# Patient Record
Sex: MALE | Race: WHITE | NOT HISPANIC OR LATINO | Employment: OTHER | URBAN - NONMETROPOLITAN AREA
[De-identification: names, ages, dates, MRNs, and addresses within clinical notes are randomized per-mention and may not be internally consistent; named-entity substitution may affect disease eponyms.]

---

## 2017-01-03 ENCOUNTER — TELEPHONE (OUTPATIENT)
Dept: VASCULAR SURGERY | Facility: CLINIC | Age: 52
End: 2017-01-03

## 2017-01-06 ENCOUNTER — HOSPITAL ENCOUNTER (OUTPATIENT)
Dept: PHYSICAL THERAPY | Facility: HOSPITAL | Age: 52
Setting detail: THERAPIES SERIES
Discharge: HOME | End: 2017-01-06
Attending: PODIATRIST

## 2017-01-06 DIAGNOSIS — G89.29 CHRONIC PAIN OF LEFT ANKLE: Primary | ICD-10-CM

## 2017-01-06 DIAGNOSIS — M25.572 CHRONIC PAIN OF LEFT ANKLE: Primary | ICD-10-CM

## 2017-01-06 DIAGNOSIS — M95.8 OSTEOCHONDRAL DEFECT OF ANKLE: ICD-10-CM

## 2017-01-06 PROCEDURE — 97161 PT EVAL LOW COMPLEX 20 MIN: CPT

## 2017-01-06 NOTE — PROGRESS NOTES
Outpatient Physical Therapy Ortho Initial Evaluation   [facility]     Patient Name: Ismael Huff  : 1965  MRN: [id number]  Today's Date: 2017      Visit Date: 2017    Patient Active Problem List   Diagnosis   • Osteochondral defect of ankle   • Chronic pain of left ankle        Past Medical History   Diagnosis Date   • Ankle pain, left    • Arthritis    • Gout    • History of kidney stones    • History of pneumonia    • Tendonitis of ankle      left        Past Surgical History   Procedure Laterality Date   • Colonoscopy     • Knee surgery Left      torn cartilage repair   • Colectomy partial / total       polyps ruptured   • Leg excision lesion/cyst Left 2016     Procedure: ANKLE LOOSE BODY EXCISION/REMOVAL, LEFT;  Surgeon: Ajay Comer DPM;  Location: [facility];  Service:    • Ankle surgery Left        Visit Dx:     ICD-10-CM ICD-9-CM   1. Chronic pain of left ankle M25.572 719.47    G89.29 338.29   2. Osteochondral defect of ankle M95.8 738.8             Patient History       17 1400          History    Chief Complaint Pain  -RS      Type of Pain Ankle pain  -RS      Date Current Problem(s) Began 10/19/16  -RS      Brief Description of Current Complaint Patient underwent surgery for a osteochondral defect on the left ankle on 2106.  He has had multiple ankle sprains prior to surgery.  The ankle was swelling, turning red, and had pain with weight bearing in the joint.  The patient has been in a walking boot for the past 4 weeks and is now off crutches.  Overall the ankle is doing much better but is very stiff to the patient.  No falls since surgery  -RS      Previous treatment for THIS PROBLEM Injections  -RS      Onset Date- PT 2017  -RS      Patient/Caregiver Goals Relieve pain;Return to prior level of function;Return to work  -RS      Current Tobacco Use no  -RS      Patient's Rating of General Health Very good  -RS      Hand Dominance right-handed   -RS      Occupation/sports/leisure activities   -RS      What clinical tests have you had for this problem? MRI  -RS      Pain     Pain Location Ankle   left  -RS      Pain at Present 0  -RS      Pain at Best 0  -RS      Pain at Worst 8  -RS      Pain Frequency Rarely  -RS      Pain Description Aching;Dull  -RS      Pain Comments virtually no pain over the past 2-3 weeks.  -RS      Fall Risk Assessment    Any falls in the past year: No  -RS      Daily Activities    Primary Language English  -RS      Pt Participated in POC and Goals Yes  -RS      Safety    Are you being hurt, hit, or frightened by anyone at home or in your life? No  -RS      Are you being neglected by a caregiver No  -RS        User Key  (r) = Recorded By, (t) = Taken By, (c) = Cosigned By    Initials Name Provider Type    RS Ismael Douglass, PT Physical Therapist                PT Ortho       01/06/17 1500    Posture/Observations    Observations Incision healing;Muscle atrophy  -RS    Posture/Observations Comments anterior tibialis atrophy, calf atrophy.  Scar mobility was limited in the mid portion with superior/inferior glide.  -RS    Sensation    Sensation WNL? WNL  -RS    Light Touch No apparent deficits  -RS    Additional Comments no hypersensitivity noted in general  -RS    Special Tests/Palpation    Special Tests/Palpation Ankle/Foot  -RS    Foot/Ankle Palpation    Lateral Malleolus Left:   not tender  -RS    Medial Malleolus Left:   not tender  -RS    Plantar Fascia Left:;Fibrotic  -RS    Medial Gastroc Left:;Guarded/taut;Tender  -RS    Lateral Gastroc Left:;Guarded/taut;Tender  -RS    Foot/Ankle Palpation? Yes  -RS    Ankle Accessory Motions    Ankle Accessory Motions Tested? Yes  -RS    Talocrural (talotibial) A-P glide Left:;Hypomobile  -RS    Subtalar medial/lateral tilt Left:;Hypomobile  -RS    Mid-tarsal dorsal/plantar glide Left:;Hypomobile  -RS    Tarsometatarsal dorsal/plantar glide Left:;WNL  -RS     Ankle/Foot Special Tests    Anterior drawer (ATFL lesion) Left:;Negative  -RS    Talar tilt test (instability) Left:;Negative  -RS    ROM (Range of Motion)    General ROM Detail Left AROM:  DF -6, PF 25, INV 5, EV 0.  PROM:  DF 0, PF 35, INV 25, EV 10  -RS    MMT (Manual Muscle Testing)    General MMT Assessment lower extremity strength deficits identified  -RS    Lower Extremity    Lower Ext Manual Muscle Testing left ankle strength deficit  -RS    Left Ankle/Foot    Tibialis Ant Ankle DF with Subtalar Inv (3-/5) fair minus  -RS    Tibialis Posterior Subtalar Inversion (3-/5) fair minus  -RS    Subtalar Ever Peroneus Longus & Brevis (3/5) fair  -RS    Pathomechanics    Lower Extremity Pathomechanics Excessive hip external rotation;Antalgic with midstance;Asymmetrical steps  -RS    Pathomechanics Comments due to prolonged boot wear  -RS    Balance Skills Training    SLS unable at this time due to walking boot  -RS    Gait Assessment/Treatment    Gait, Comment Patient ambulates with an antalgic gait with strong toeing out due to the walking boot.  Patient was previously on crutches.  Patient does not have any hip pain with the walking boot at this time.  Patient is vaulting over the left stance LE due to height differences from the boot.  -RS      User Key  (r) = Recorded By, (t) = Taken By, (c) = Cosigned By    Initials Name Provider Type    JENNIFER Douglass PT Physical Therapist                            Therapy Education       01/06/17 1500    Therapy Education    Given HEP  -RS    Program New   gastroc and soleus stretch; sitting DF and PF  -RS    How Provided Written  -RS    Provided to Patient  -RS    Level of Understanding Demonstrated;Verbalized  -RS      User Key  (r) = Recorded By, (t) = Taken By, (c) = Cosigned By    Initials Name Provider Type    JENNIFER Douglass PT Physical Therapist                PT OP Goals       01/06/17 1500          PT Short Term Goals    STG Date to Achieve  01/27/17  -RS      STG 1 Patient will improve left ankle DF PROM to 10 degrees  -RS      STG 1 Progress New  -RS      STG 2 Patient will improve left ankle DF AROM to 5 degrees without compensation  -RS      STG 2 Progress New  -RS      Long Term Goals    LTG Date to Achieve 02/17/17  -RS      LTG 1 Patient will be independent with a comprehensive HEP by discharge.  -RS      LTG 1 Progress New  -RS      LTG 2 Patient will improve gross ankle MMT to at least 4+/5 by discharge.  -RS      LTG 2 Progress New  -RS      LTG 3 Patient will be able to ambulate with proper forward weight shifting, avoiding forefoot ER or tibial ER, to promote return to work by discharge.  -RS      LTG 3 Progress New  -RS      LTG 4 Patient will be able to perform a left SLS without compensation at the pelvis for at least 10 seconds with the eyes closed by discharge.  -RS      LTG 4 Progress New  -RS      LTG 5 Patient will be able to return to work and perform work specific duties with <3/10 left ankle pain by discharge.  -RS      LTG 5 Progress New  -RS      Time Calculation    PT Goal Re-Cert Due Date 02/05/17  -RS        User Key  (r) = Recorded By, (t) = Taken By, (c) = Cosigned By    Initials Name Provider Type    RS Ismael Douglass, PT Physical Therapist                PT Assessment/Plan       01/06/17 1500          PT Assessment    Functional Limitations Impaired gait;Performance in work activities;Performance in leisure activities  -RS      Impairments Balance;Gait;Joint mobility;Muscle strength;Pain;Range of motion  -RS      Assessment Comments Patient presents today s/p left ankle surgery for an osteochondral defect on 11/22/2016.  Patient has been in a walking boot and is still wearing this boot at the time of the evaluation.  The DPM will be contacted regarding timetable for removing the walking boot.  The patient does not have enough ankle dorsiflexion ROM at this time to walk without significant compensation, which is  "partly due to prolonged boot wear.  General motion in all planes is limited both actively and passively.  Corresponding diffuse weakness is noted.  The ankle is only slightly swollen at this time with no formal girth measurements taken today.  Joint mobility of the talocrural and sub talar are stiff and should improve quickly with manual intervention.  The gastrocnemius is the main contributor to the insufficient dorsiflexion at this time and we will work to improve flexibility early on.  We will gradually progress weight bearing, strengthening, and proprioception as ankle mobility/ROM improves.  Patient is motivated to get back to work and this is a positive finding for therapy.  Patient was given a \"Tg \" size \"D\" compression stockinette to help with the swelling.  Modalities may be used for control of swelling early on.  The incision was closed with no symptoms of infection.  Thank you for allowing us to work with this patient.   -RS      Please refer to paper survey for additional self-reported information Yes  -RS      Rehab Potential Good  -RS      Patient/caregiver participated in establishment of treatment plan and goals Yes  -RS      Patient would benefit from skilled therapy intervention Yes  -RS      PT Plan    PT Frequency 1x/week;2x/week  -RS      Predicted Duration of Therapy Intervention (days/wks) 4-6 weeks  -RS      Planned CPT's? PT EVAL: 59245;PT THER PROC EA 15 MIN: 63681;PT MANUAL THERAPY EA 15 MIN: 19101;PT NEUROMUSC RE-EDUCATION EA 15 MIN: 27024;PT GAIT TRAINING EA 15 MIN: 11731;PT ELECTRICAL STIM UNATTEND: ;PT ELECTRICAL STIM ATTD EA 15 MIN: 48875  -RS      Physical Therapy Interventions (Optional Details) balance training;gait training;home exercise program;joint mobilization;manual lymphatic drainage;modalities;neuromuscular re-education;patient/family education;ROM (Range of Motion);strengthening;stretching;taping  -RS      PT Plan Comments We will begin with early focus on ROM, " joint mobility, and improving gastronemius flexibility due to boot wear.  We will integrate strengthening and proprioception as ROM gains improve.  The patient has an insurance that does not cover outpatient services and this will be taken into consideration when we are scheduling visits after authorization.  -RS        User Key  (r) = Recorded By, (t) = Taken By, (c) = Cosigned By    Initials Name Provider Type    RS Ismael Douglass, PT Physical Therapist                                    Time Calculation:   Start Time: 1435  Stop Time: 1520  Time Calculation (min): 45 min     Therapy Charges for Today     Code Description Service Date Service Provider Modifiers Qty    92284225439  PT EVAL LOW COMPLEXITY 3 1/6/2017 Ismael Douglass, PT GP 1                    Ismael Douglass, PT  1/6/2017

## 2017-01-11 ENCOUNTER — HOSPITAL ENCOUNTER (OUTPATIENT)
Dept: PHYSICAL THERAPY | Facility: HOSPITAL | Age: 52
Setting detail: THERAPIES SERIES
Discharge: HOME | End: 2017-01-11
Attending: PODIATRIST

## 2017-01-11 DIAGNOSIS — M95.8 OSTEOCHONDRAL DEFECT OF ANKLE: ICD-10-CM

## 2017-01-11 DIAGNOSIS — M25.572 CHRONIC PAIN OF LEFT ANKLE: Primary | ICD-10-CM

## 2017-01-11 DIAGNOSIS — G89.29 CHRONIC PAIN OF LEFT ANKLE: Primary | ICD-10-CM

## 2017-01-11 PROCEDURE — 97110 THERAPEUTIC EXERCISES: CPT | Performed by: PHYSICAL THERAPIST

## 2017-01-11 PROCEDURE — 97140 MANUAL THERAPY 1/> REGIONS: CPT | Performed by: PHYSICAL THERAPIST

## 2017-01-11 NOTE — PROGRESS NOTES
Outpatient Physical Therapy Ortho Treatment Note   [facility]     Patient Name: Ismael Huff  : 1965  MRN: [id number]  Today's Date: 2017      Visit Date: 2017    Visit Dx:    ICD-10-CM ICD-9-CM   1. Chronic pain of left ankle M25.572 719.47    G89.29 338.29   2. Osteochondral defect of ankle M95.8 738.8       Patient Active Problem List   Diagnosis   • Osteochondral defect of ankle   • Chronic pain of left ankle        Past Medical History   Diagnosis Date   • Ankle pain, left    • Arthritis    • Gout    • History of kidney stones    • History of pneumonia    • Tendonitis of ankle      left        Past Surgical History   Procedure Laterality Date   • Colonoscopy     • Knee surgery Left      torn cartilage repair   • Colectomy partial / total       polyps ruptured   • Leg excision lesion/cyst Left 2016     Procedure: ANKLE LOOSE BODY EXCISION/REMOVAL, LEFT;  Surgeon: Ajay Comer DPM;  Location: [facility] OR;  Service:    • Ankle surgery Left                              PT Assessment/Plan       17 1424 17 1500       PT Assessment    Functional Limitations  Impaired gait;Performance in work activities;Performance in leisure activities  -RS     Impairments  Balance;Gait;Joint mobility;Muscle strength;Pain;Range of motion  -RS     Assessment Comments No goals met today, this is his first visit since his initial evaluation. He is still limited with ankle and calf mobiltiy.   -KR Patient presents today s/p left ankle surgery for an osteochondral defect on 2016.  Patient has been in a walking boot and is still wearing this boot at the time of the evaluation.  The DPM will be contacted regarding timetable for removing the walking boot.  The patient does not have enough ankle dorsiflexion ROM at this time to walk without significant compensation, which is partly due to prolonged boot wear.  General motion in all planes is limited both actively and passively.   "Corresponding diffuse weakness is noted.  The ankle is only slightly swollen at this time with no formal girth measurements taken today.  Joint mobility of the talocrural and sub talar are stiff and should improve quickly with manual intervention.  The gastrocnemius is the main contributor to the insufficient dorsiflexion at this time and we will work to improve flexibility early on.  We will gradually progress weight bearing, strengthening, and proprioception as ankle mobility/ROM improves.  Patient is motivated to get back to work and this is a positive finding for therapy.  Patient was given a \"Tg \" size \"D\" compression stockinette to help with the swelling.  Modalities may be used for control of swelling early on.  The incision was closed with no symptoms of infection.  Thank you for allowing us to work with this patient.   -RS     Please refer to paper survey for additional self-reported information  Yes  -RS     Rehab Potential  Good  -RS     Patient/caregiver participated in establishment of treatment plan and goals  Yes  -RS     Patient would benefit from skilled therapy intervention  Yes  -RS     PT Plan    PT Frequency  1x/week;2x/week  -RS     Predicted Duration of Therapy Intervention (days/wks)  4-6 weeks  -RS     Planned CPT's?  PT EVAL: 62371;PT THER PROC EA 15 MIN: 09444;PT MANUAL THERAPY EA 15 MIN: 78762;PT NEUROMUSC RE-EDUCATION EA 15 MIN: 34998;PT GAIT TRAINING EA 15 MIN: 23693;PT ELECTRICAL STIM UNATTEND: ;PT ELECTRICAL STIM ATTD EA 15 MIN: 01312  -RS     Physical Therapy Interventions (Optional Details)  balance training;gait training;home exercise program;joint mobilization;manual lymphatic drainage;modalities;neuromuscular re-education;patient/family education;ROM (Range of Motion);strengthening;stretching;taping  -RS     PT Plan Comments We will continue to work ankle and calf mobiltiy and progress with ankle stabiltiy and proprioception.   -KR We will begin with early focus on ROM, " joint mobility, and improving gastronemius flexibility due to boot wear.  We will integrate strengthening and proprioception as ROM gains improve.  The patient has an insurance that does not cover outpatient services and this will be taken into consideration when we are scheduling visits after authorization.  -RS       User Key  (r) = Recorded By, (t) = Taken By, (c) = Cosigned By    Initials Name Provider Type    ESVIN Barnett, PT Physical Therapist    RS Ismael Douglass, PT Physical Therapist                    Exercises       01/11/17 1424          Subjective Comments    Subjective Comments He repots only a littl pain in the ankle with exercises.   -KR      Subjective Pain    Able to rate subjective pain? yes  -KR      Pre-Treatment Pain Level 0  -KR      Post-Treatment Pain Level 0  -KR      Exercise 1    Exercise Name 1 therapist assist place and pt hold PF/DF/Ev/In  -KR      Reps 1 5  -KR      Time (Seconds) 1 10  -KR      Exercise 2    Exercise Name 2 discussed boot use and conitnuation of HEP.   -KR        User Key  (r) = Recorded By, (t) = Taken By, (c) = Cosigned By    Initials Name Provider Type    ESVIN Barnett, PT Physical Therapist                        Manual Rx (last 36 hours)      Manual Treatments       01/11/17 1424          Manual Rx 1    Manual Rx 1 Location reclined talocrual A-Ps   with stretching intermittently.   -KR      Manual Rx 1 Grade 1-2  -KR      Manual Rx 1 Duration 10  -KR      Manual Rx 2    Manual Rx 2 Location prone STM to gastroc/soleus and plantar fascia  -KR      Manual Rx 2 Type with edge tool  -KR      Manual Rx 2 Grade mod   -KR      Manual Rx 2 Duration 15  -KR        User Key  (r) = Recorded By, (t) = Taken By, (c) = Cosigned By    Initials Name Provider Type    ESVIN Barnett, PT Physical Therapist                PT OP Goals       01/11/17 1424 01/06/17 1500       PT Short Term Goals    STG Date to Achieve 01/27/17  -ESVIN 01/27/17  -RS     STG  1 Patient will improve left ankle DF PROM to 10 degrees  -KR Patient will improve left ankle DF PROM to 10 degrees  -RS     STG 1 Progress Ongoing  -KR New  -RS     STG 2 Patient will improve left ankle DF AROM to 5 degrees without compensation  -KR Patient will improve left ankle DF AROM to 5 degrees without compensation  -RS     STG 2 Progress Ongoing  -KR New  -RS     Long Term Goals    LTG Date to Achieve 02/17/17  -KR 02/17/17  -RS     LTG 1 Patient will be independent with a comprehensive HEP by discharge.  -KR Patient will be independent with a comprehensive HEP by discharge.  -RS     LTG 1 Progress Ongoing  -KR New  -RS     LTG 1 Progress Comments verbalizes compliance with inital.   -KR      LTG 2 Patient will improve gross ankle MMT to at least 4+/5 by discharge.  -KR Patient will improve gross ankle MMT to at least 4+/5 by discharge.  -RS     LTG 2 Progress Ongoing  -KR New  -RS     LTG 2 Progress Comments still limtied with mobillity.   -KR      LTG 3 Patient will be able to ambulate with proper forward weight shifting, avoiding forefoot ER or tibial ER, to promote return to work by discharge.  -KR Patient will be able to ambulate with proper forward weight shifting, avoiding forefoot ER or tibial ER, to promote return to work by discharge.  -RS     LTG 3 Progress Ongoing  -KR New  -RS     LTG 4 Patient will be able to perform a left SLS without compensation at the pelvis for at least 10 seconds with the eyes closed by discharge.  -KR Patient will be able to perform a left SLS without compensation at the pelvis for at least 10 seconds with the eyes closed by discharge.  -RS     LTG 4 Progress Ongoing  -KR New  -RS     LTG 5 Patient will be able to return to work and perform work specific duties with <3/10 left ankle pain by discharge.  -KR Patient will be able to return to work and perform work specific duties with <3/10 left ankle pain by discharge.  -RS     LTG 5 Progress Ongoing  -KR New  -RS     Time  Calculation    PT Goal Re-Cert Due Date 02/05/17  -KR 02/05/17  -RS       User Key  (r) = Recorded By, (t) = Taken By, (c) = Cosigned By    Initials Name Provider Type    ESVIN Barnett, PT Physical Therapist    RS Ismael Douglass, PT Physical Therapist                Therapy Education       01/11/17 1424    Therapy Education    Given HEP;Symptoms/condition management;Posture/body mechanics  -KR    Program Reinforced  -KR    How Provided Verbal  -KR    Provided to Patient  -KR    Level of Understanding Verbalized  -KR      01/06/17 1500    Therapy Education    Given HEP  -RS    Program New   gastroc and soleus stretch; sitting DF and PF  -RS    How Provided Written  -RS    Provided to Patient  -RS    Level of Understanding Demonstrated;Verbalized  -RS      User Key  (r) = Recorded By, (t) = Taken By, (c) = Cosigned By    Initials Name Provider Type    ESVIN Barnett, PT Physical Therapist    RS Ismael Douglass, PT Physical Therapist                Time Calculation:   Start Time: 1424  Stop Time: 1511  Time Calculation (min): 47 min  Total Timed Code Minutes- PT: 47 minute(s)    Therapy Charges for Today     Code Description Service Date Service Provider Modifiers Qty    80104044920 HC PT THER PROC EA 15 MIN 1/11/2017 Allyn Barnett, PT GP 1    54668889503 HC PT MANUAL THERAPY EA 15 MIN 1/11/2017 Allyn Barnett, PT GP 2                    Allyn Barnett, PT  1/11/2017

## 2017-01-13 ENCOUNTER — HOSPITAL ENCOUNTER (OUTPATIENT)
Dept: PHYSICAL THERAPY | Facility: HOSPITAL | Age: 52
Setting detail: THERAPIES SERIES
Discharge: HOME | End: 2017-01-13
Attending: PODIATRIST

## 2017-01-13 DIAGNOSIS — G89.29 CHRONIC PAIN OF LEFT ANKLE: Primary | ICD-10-CM

## 2017-01-13 DIAGNOSIS — M25.572 CHRONIC PAIN OF LEFT ANKLE: Primary | ICD-10-CM

## 2017-01-13 DIAGNOSIS — M95.8 OSTEOCHONDRAL DEFECT OF ANKLE: ICD-10-CM

## 2017-01-13 PROCEDURE — 97140 MANUAL THERAPY 1/> REGIONS: CPT

## 2017-01-13 NOTE — PROGRESS NOTES
Outpatient Physical Therapy Ortho Treatment Note  [facility]     Patient Name: Ismael Huff  : 1965  MRN: [id number]  Today's Date: 2017      Visit Date: 2017    Visit Dx:    ICD-10-CM ICD-9-CM   1. Chronic pain of left ankle M25.572 719.47    G89.29 338.29   2. Osteochondral defect of ankle M95.8 738.8       Patient Active Problem List   Diagnosis   • Osteochondral defect of ankle   • Chronic pain of left ankle        Past Medical History   Diagnosis Date   • Ankle pain, left    • Arthritis    • Gout    • History of kidney stones    • History of pneumonia    • Tendonitis of ankle      left        Past Surgical History   Procedure Laterality Date   • Colonoscopy     • Knee surgery Left      torn cartilage repair   • Colectomy partial / total       polyps ruptured   • Leg excision lesion/cyst Left 2016     Procedure: ANKLE LOOSE BODY EXCISION/REMOVAL, LEFT;  Surgeon: Ajay Comer DPM;  Location: [facility] OR;  Service:    • Ankle surgery Left                              PT Assessment/Plan       17 1646 17 1424       PT Assessment    Assessment Comments L ankle measured 49 cm, R measured 48.3 cm figure 8 at joint line today. M/L tiibial glide was restricted prior to mobilization. I advised him to increase his ice use over the weekend.  -EC No goals met today, this is his first visit since his initial evaluation. He is still limited with ankle and calf mobiltiy.   -KR     PT Plan    PT Plan Comments Increase aactivities with BAPS as they were difficult for him to control today.  -EC We will continue to work ankle and calf mobiltiy and progress with ankle stabiltiy and proprioception.   -KR       User Key  (r) = Recorded By, (t) = Taken By, (c) = Cosigned By    Initials Name Provider Type    EC Varun Archibald, PTA Physical Therapy Assistant    ESVIN Barnett, PT Physical Therapist                    Exercises       17 1500          Subjective  Comments    Subjective Comments Denies pain or rrecent falls, reports feels a little stiff in L ankle  -EC      Subjective Pain    Able to rate subjective pain? yes  -EC      Pre-Treatment Pain Level 0  -EC      Post-Treatment Pain Level 0  -EC      Exercise 1    Exercise Name 1 DF/PF, pronation/supination, CW   -EC      Equipment 1 --   BAPS board L 2  -EC      Time (Minutes) 1 2   each  -EC        User Key  (r) = Recorded By, (t) = Taken By, (c) = Cosigned By    Initials Name Provider Type    EC Varun Archibald PTA Physical Therapy Assistant                        Manual Rx (last 36 hours)      Manual Treatments       01/13/17 1300          Manual Rx 1    Manual Rx 1 Location L gastroc  -EC      Manual Rx 1 Type IASTM with EDGE  tool  -EC      Manual Rx 1 Duration 15  -EC      Manual Rx 2    Manual Rx 2 Location L taleocrural  -EC      Manual Rx 2 Type P/A mobilization with float and intermittent distraction  -EC      Manual Rx 2 Duration 15  -EC      Manual Rx 3    Manual Rx 3 Location M/L tibial glides  -EC      Manual Rx 3 Grade 2  -EC      Manual Rx 3 Duration 5  -EC      Manual Rx 4    Manual Rx 4 Location DFR B plantar surface  -EC      Manual Rx 4 Duration 5   2.5 min each  -EC        User Key  (r) = Recorded By, (t) = Taken By, (c) = Cosigned By    Initials Name Provider Type    EC Varun Archibald PTA Physical Therapy Assistant                PT OP Goals       01/13/17 1500 01/13/17 1458 01/11/17 1424    PT Short Term Goals    STG Date to Achieve 01/27/17  -EC  01/27/17  -KR    STG 1 Patient will improve left ankle DF PROM to 10 degrees  -EC  Patient will improve left ankle DF PROM to 10 degrees  -KR    STG 1 Progress Ongoing  -EC  Ongoing  -KR    STG 2 Patient will improve left ankle DF AROM to 5 degrees without compensation  -EC  Patient will improve left ankle DF AROM to 5 degrees without compensation  -KR    STG 2 Progress Ongoing  -EC  Ongoing  -KR    STG 2 Progress Comments L ankle exhibited  edmea and restricted ROM  -EC      Long Term Goals    LTG Date to Achieve 02/17/17  -EC  02/17/17  -KR    LTG 1 Patient will be independent with a comprehensive HEP by discharge.  -EC  Patient will be independent with a comprehensive HEP by discharge.  -KR    LTG 1 Progress Ongoing  -EC  Ongoing  -KR    LTG 1 Progress Comments   verbalizes compliance with inital.   -KR    LTG 2 Patient will improve gross ankle MMT to at least 4+/5 by discharge.  -EC  Patient will improve gross ankle MMT to at least 4+/5 by discharge.  -KR    LTG 2 Progress Ongoing  -EC  Ongoing  -KR    LTG 2 Progress Comments   still limtied with mobillity.   -KR    LTG 3 Patient will be able to ambulate with proper forward weight shifting, avoiding forefoot ER or tibial ER, to promote return to work by discharge.  -EC  Patient will be able to ambulate with proper forward weight shifting, avoiding forefoot ER or tibial ER, to promote return to work by discharge.  -KR    LTG 3 Progress Ongoing  -EC  Ongoing  -KR    LTG 3 Progress Comments gait pattern is slowly improving   -EC      LTG 4 Patient will be able to perform a left SLS without compensation at the pelvis for at least 10 seconds with the eyes closed by discharge.  -EC  Patient will be able to perform a left SLS without compensation at the pelvis for at least 10 seconds with the eyes closed by discharge.  -KR    LTG 4 Progress Ongoing  -EC  Ongoing  -KR    LTG 5 Patient will be able to return to work and perform work specific duties with <3/10 left ankle pain by discharge.  -EC  Patient will be able to return to work and perform work specific duties with <3/10 left ankle pain by discharge.  -KR    LTG 5 Progress Ongoing  -EC  Ongoing  -KR    Time Calculation    PT Goal Re-Cert Due Date  02/05/17  -EC 02/05/17  -KR      01/06/17 1500          PT Short Term Goals    STG Date to Achieve 01/27/17  -RS      STG 1 Patient will improve left ankle DF PROM to 10 degrees  -RS      STG 1 Progress New   -RS      STG 2 Patient will improve left ankle DF AROM to 5 degrees without compensation  -RS      STG 2 Progress New  -RS      Long Term Goals    LTG Date to Achieve 02/17/17  -RS      LTG 1 Patient will be independent with a comprehensive HEP by discharge.  -RS      LTG 1 Progress New  -RS      LTG 2 Patient will improve gross ankle MMT to at least 4+/5 by discharge.  -RS      LTG 2 Progress New  -RS      LTG 3 Patient will be able to ambulate with proper forward weight shifting, avoiding forefoot ER or tibial ER, to promote return to work by discharge.  -RS      LTG 3 Progress New  -RS      LTG 4 Patient will be able to perform a left SLS without compensation at the pelvis for at least 10 seconds with the eyes closed by discharge.  -RS      LTG 4 Progress New  -RS      LTG 5 Patient will be able to return to work and perform work specific duties with <3/10 left ankle pain by discharge.  -RS      LTG 5 Progress New  -RS      Time Calculation    PT Goal Re-Cert Due Date 02/05/17  -RS        User Key  (r) = Recorded By, (t) = Taken By, (c) = Cosigned By    Initials Name Provider Type     Varun Archibald PTA Physical Therapy Assistant    ESVIN Barnett, PT Physical Therapist    RS Ismael Douglass, TRA Physical Therapist                Therapy Education       01/11/17 1424    Therapy Education    Given HEP;Symptoms/condition management;Posture/body mechanics  -KR    Program Reinforced  -KR    How Provided Verbal  -KR    Provided to Patient  -KR    Level of Understanding Verbalized  -KR      User Key  (r) = Recorded By, (t) = Taken By, (c) = Cosigned By    Initials Name Provider Type    ESVIN Barnett PT Physical Therapist                Time Calculation:   Start Time: 0257  Stop Time: 0345  Time Calculation (min): 48 min    Therapy Charges for Today     Code Description Service Date Service Provider Modifiers Qty    00845365137 HC PT MANUAL THERAPY EA 15 MIN 1/13/2017 Varun Archibald PTA GP 3                     Varun Archibald, PTA  1/13/2017

## 2017-01-16 ENCOUNTER — HOSPITAL ENCOUNTER (OUTPATIENT)
Dept: PHYSICAL THERAPY | Facility: HOSPITAL | Age: 52
Setting detail: THERAPIES SERIES
Discharge: HOME | End: 2017-01-16
Attending: PODIATRIST

## 2017-01-16 DIAGNOSIS — M25.572 CHRONIC PAIN OF LEFT ANKLE: Primary | ICD-10-CM

## 2017-01-16 DIAGNOSIS — M95.8 OSTEOCHONDRAL DEFECT OF ANKLE: ICD-10-CM

## 2017-01-16 DIAGNOSIS — G89.29 CHRONIC PAIN OF LEFT ANKLE: Primary | ICD-10-CM

## 2017-01-16 PROCEDURE — 97032 APPL MODALITY 1+ESTIM EA 15: CPT

## 2017-01-16 PROCEDURE — 97140 MANUAL THERAPY 1/> REGIONS: CPT

## 2017-01-16 NOTE — PROGRESS NOTES
Outpatient Physical Therapy Ortho Treatment Note  [facility]     Patient Name: Ismael Huff  : 1965  MRN: [id number]  Today's Date: 2017      Visit Date: 2017    Visit Dx:    ICD-10-CM ICD-9-CM   1. Chronic pain of left ankle M25.572 719.47    G89.29 338.29   2. Osteochondral defect of ankle M95.8 738.8       Patient Active Problem List   Diagnosis   • Osteochondral defect of ankle   • Chronic pain of left ankle        Past Medical History   Diagnosis Date   • Ankle pain, left    • Arthritis    • Gout    • History of kidney stones    • History of pneumonia    • Tendonitis of ankle      left        Past Surgical History   Procedure Laterality Date   • Colonoscopy     • Knee surgery Left      torn cartilage repair   • Colectomy partial / total       polyps ruptured   • Leg excision lesion/cyst Left 2016     Procedure: ANKLE LOOSE BODY EXCISION/REMOVAL, LEFT;  Surgeon: Ajay Comer DPM;  Location: [facility] OR;  Service:    • Ankle surgery Left                              PT Assessment/Plan       17 1643 17 1646 17 1424    PT Assessment    Assessment Comments L ankle continues to exhibit increased superficial edema despite his ice use and consistent use stevan Tubigrip. However, his abnormal gait patternn may be chronically increasing his ankle edema. Also today he presents with decreased tibial rotation.  -EC L ankle measured 49 cm, R measured 48.3 cm figure 8 at joint line today. M/L tiibial glide was restricted prior to mobilization. I advised him to increase his ice use over the weekend.  -EC No goals met today, this is his first visit since his initial evaluation. He is still limited with ankle and calf mobiltiy.   -KR    PT Plan    PT Plan Comments Continue to progress as symptoms allow  -EC Increase aactivities with BAPS as they were difficult for him to control today.  -EC We will continue to work ankle and calf mobiltiy and progress with ankle  stabiltiy and proprioception.   -KR      User Key  (r) = Recorded By, (t) = Taken By, (c) = Cosigned By    Initials Name Provider Type    LAKESHIA Archibald PTA Physical Therapy Assistant    KR Allyn Barnett, PT Physical Therapist                Modalities       01/16/17 1500          ELECTRICAL STIMULATION    Attended/Unattended Attended  -EC      Stimulation Type --   Laser stim Chronic Inflammation mode  -EC        User Key  (r) = Recorded By, (t) = Taken By, (c) = Cosigned By    Initials Name Provider Type    EC Varun Archibald PTA Physical Therapy Assistant                Exercises       01/16/17 1600          Subjective Comments    Subjective Comments Denies pain or new complaints, reportss used ice daiy over weekend and didn't notice a difference in his swelling  -EC      Subjective Pain    Able to rate subjective pain? yes  -EC      Pre-Treatment Pain Level 0  -EC      Post-Treatment Pain Level 0  -EC      Exercise 1    Exercise Name 1 pillow case scrunchies  -EC      Time (Minutes) 1 2  -EC      Exercise 2    Exercise Name 2 KT application L ankle for lymphatic drainage  -EC        User Key  (r) = Recorded By, (t) = Taken By, (c) = Cosigned By    Initials Name Provider Type    EC Varun Archibald PTA Physical Therapy Assistant                        Manual Rx (last 36 hours)      Manual Treatments       01/16/17 1500          Manual Rx 1    Manual Rx 1 Location L gastroc  -EC      Manual Rx 1 Type IASTM with EDGE  tool  -EC      Manual Rx 1 Duration 10  -EC      Manual Rx 2    Manual Rx 2 Location L taleocrural  -EC      Manual Rx 2 Type P/A mobilization with float and intermittent distraction  -EC      Manual Rx 2 Grade mod   -EC      Manual Rx 2 Duration 10  -EC      Manual Rx 3    Manual Rx 3 Location M/L tibial glides  -EC      Manual Rx 3 Grade 2  -EC      Manual Rx 3 Duration 5  -EC      Manual Rx 4    Manual Rx 4 Location L tibia  -EC      Manual Rx 4 Type IR glides  -EC      Manual Rx 4  Grade 2  -EC      Manual Rx 4 Duration 5  -EC        User Key  (r) = Recorded By, (t) = Taken By, (c) = Cosigned By    Initials Name Provider Type    LAKESHIA Archibald, PTA Physical Therapy Assistant                PT OP Goals       01/16/17 1455 01/16/17 1400 01/13/17 1500    PT Short Term Goals    STG Date to Achieve  01/27/17  -EC 01/27/17  -EC    STG 1  Patient will improve left ankle DF PROM to 10 degrees  -EC Patient will improve left ankle DF PROM to 10 degrees  -EC    STG 1 Progress  Ongoing  -EC Ongoing  -EC    STG 2  Patient will improve left ankle DF AROM to 5 degrees without compensation  -EC Patient will improve left ankle DF AROM to 5 degrees without compensation  -EC    STG 2 Progress  Ongoing  -EC Ongoing  -EC    STG 2 Progress Comments   L ankle exhibited edmea and restricted ROM  -EC    Long Term Goals    LTG Date to Achieve  02/17/17  -EC 02/17/17  -EC    LTG 1  Patient will be independent with a comprehensive HEP by discharge.  -EC Patient will be independent with a comprehensive HEP by discharge.  -EC    LTG 1 Progress  Ongoing  -EC Ongoing  -EC    LTG 2  Patient will improve gross ankle MMT to at least 4+/5 by discharge.  -EC Patient will improve gross ankle MMT to at least 4+/5 by discharge.  -EC    LTG 2 Progress  Ongoing  -EC Ongoing  -EC    LTG 3  Patient will be able to ambulate with proper forward weight shifting, avoiding forefoot ER or tibial ER, to promote return to work by discharge.  -EC Patient will be able to ambulate with proper forward weight shifting, avoiding forefoot ER or tibial ER, to promote return to work by discharge.  -EC    LTG 3 Progress  Ongoing  -EC Ongoing  -EC    LTG 3 Progress Comments  flat foot with absent HS, reduced WS and stance phase on L LE  -EC gait pattern is slowly improving   -EC    LTG 4  Patient will be able to perform a left SLS without compensation at the pelvis for at least 10 seconds with the eyes closed by discharge.  -EC Patient will be able  to perform a left SLS without compensation at the pelvis for at least 10 seconds with the eyes closed by discharge.  -EC    LTG 4 Progress  Ongoing  -EC Ongoing  -EC    LTG 5  Patient will be able to return to work and perform work specific duties with <3/10 left ankle pain by discharge.  -EC Patient will be able to return to work and perform work specific duties with <3/10 left ankle pain by discharge.  -EC    LTG 5 Progress  Ongoing  -EC Ongoing  -EC    LTG 5 Progress Comments  reports walks an average of four miles daily at work and includes lifting and carrying things   -EC     Time Calculation    PT Goal Re-Cert Due Date 02/05/17  -EC        01/13/17 1458 01/11/17 1424 01/06/17 1500    PT Short Term Goals    STG Date to Achieve  01/27/17  -KR 01/27/17  -RS    STG 1  Patient will improve left ankle DF PROM to 10 degrees  -KR Patient will improve left ankle DF PROM to 10 degrees  -RS    STG 1 Progress  Ongoing  -KR New  -RS    STG 2  Patient will improve left ankle DF AROM to 5 degrees without compensation  -KR Patient will improve left ankle DF AROM to 5 degrees without compensation  -RS    STG 2 Progress  Ongoing  -KR New  -RS    Long Term Goals    LTG Date to Achieve  02/17/17  -KR 02/17/17  -RS    LTG 1  Patient will be independent with a comprehensive HEP by discharge.  -KR Patient will be independent with a comprehensive HEP by discharge.  -RS    LTG 1 Progress  Ongoing  -KR New  -RS    LTG 1 Progress Comments  verbalizes compliance with inital.   -KR     LTG 2  Patient will improve gross ankle MMT to at least 4+/5 by discharge.  -KR Patient will improve gross ankle MMT to at least 4+/5 by discharge.  -RS    LTG 2 Progress  Ongoing  -KR New  -RS    LTG 2 Progress Comments  still limtied with mobillity.   -KR     LTG 3  Patient will be able to ambulate with proper forward weight shifting, avoiding forefoot ER or tibial ER, to promote return to work by discharge.  -KR Patient will be able to ambulate with  proper forward weight shifting, avoiding forefoot ER or tibial ER, to promote return to work by discharge.  -RS    LTG 3 Progress  Ongoing  -KR New  -RS    LTG 4  Patient will be able to perform a left SLS without compensation at the pelvis for at least 10 seconds with the eyes closed by discharge.  -KR Patient will be able to perform a left SLS without compensation at the pelvis for at least 10 seconds with the eyes closed by discharge.  -RS    LTG 4 Progress  Ongoing  -KR New  -RS    LTG 5  Patient will be able to return to work and perform work specific duties with <3/10 left ankle pain by discharge.  -KR Patient will be able to return to work and perform work specific duties with <3/10 left ankle pain by discharge.  -RS    LTG 5 Progress  Ongoing  -KR New  -RS    Time Calculation    PT Goal Re-Cert Due Date 02/05/17  -EC 02/05/17  -KR 02/05/17  -RS      User Key  (r) = Recorded By, (t) = Taken By, (c) = Cosigned By    Initials Name Provider Type    EC Varun Archibald PTA Physical Therapy Assistant    ESVIN Barnett, PT Physical Therapist    RS Ismael Douglass, PT Physical Therapist                Therapy Education       01/16/17 1643    Therapy Education    Given Symptoms/condition management;HEP  -EC    Program New   2 min rep pillowcase scrunchies  -EC    How Provided Verbal  -EC    Provided to Patient  -EC    Level of Understanding Verbalized  -EC      01/11/17 1424    Therapy Education    Given HEP;Symptoms/condition management;Posture/body mechanics  -KR    Program Reinforced  -KR    How Provided Verbal  -KR    Provided to Patient  -KR    Level of Understanding Verbalized  -KR      User Key  (r) = Recorded By, (t) = Taken By, (c) = Cosigned By    Initials Name Provider Type    LAKESHIA Archibald PTA Physical Therapy Assistant    ESVIN Barnett, PT Physical Therapist                Time Calculation:   Start Time: 0252  Stop Time: 0342  Time Calculation (min): 50 min  Total Timed Code  Minutes- PT: 50 minute(s)    Therapy Charges for Today     Code Description Service Date Service Provider Modifiers Qty    64683008388 HC PT ELEC STIM EA-PER 15 MIN 1/16/2017 Varun Archibald, PTA GP 1    66984688010 HC PT MANUAL THERAPY EA 15 MIN 1/16/2017 Varun Archibald PTA GP 2                    Varun Archibald, VIC  1/16/2017

## 2017-01-18 ENCOUNTER — HOSPITAL ENCOUNTER (OUTPATIENT)
Dept: PHYSICAL THERAPY | Facility: HOSPITAL | Age: 52
Setting detail: THERAPIES SERIES
Discharge: HOME | End: 2017-01-18
Attending: PODIATRIST

## 2017-01-18 DIAGNOSIS — M95.8 OSTEOCHONDRAL DEFECT OF ANKLE: ICD-10-CM

## 2017-01-18 DIAGNOSIS — G89.29 CHRONIC PAIN OF LEFT ANKLE: Primary | ICD-10-CM

## 2017-01-18 DIAGNOSIS — M25.572 CHRONIC PAIN OF LEFT ANKLE: Primary | ICD-10-CM

## 2017-01-18 PROCEDURE — 97110 THERAPEUTIC EXERCISES: CPT

## 2017-01-18 PROCEDURE — 97032 APPL MODALITY 1+ESTIM EA 15: CPT

## 2017-01-18 PROCEDURE — 97140 MANUAL THERAPY 1/> REGIONS: CPT

## 2017-01-18 NOTE — PROGRESS NOTES
Outpatient Physical Therapy Ortho Treatment Note  [facility]     Patient Name: Ismael Huff  : 1965  MRN: [id number]  Today's Date: 2017      Visit Date: 2017    Visit Dx:    ICD-10-CM ICD-9-CM   1. Chronic pain of left ankle M25.572 719.47    G89.29 338.29   2. Osteochondral defect of ankle M95.8 738.8       Patient Active Problem List   Diagnosis   • Osteochondral defect of ankle   • Chronic pain of left ankle        Past Medical History   Diagnosis Date   • Ankle pain, left    • Arthritis    • Gout    • History of kidney stones    • History of pneumonia    • Tendonitis of ankle      left        Past Surgical History   Procedure Laterality Date   • Colonoscopy     • Knee surgery Left      torn cartilage repair   • Colectomy partial / total       polyps ruptured   • Leg excision lesion/cyst Left 2016     Procedure: ANKLE LOOSE BODY EXCISION/REMOVAL, LEFT;  Surgeon: Ajay Comer DPM;  Location: [facility] OR;  Service:    • Ankle surgery Left                              PT Assessment/Plan       17 1515 17 1643 17 1646    PT Assessment    Assessment Comments L ankle measured 48.8 cm R measured 48.3 cm figure eight form. He was able to perfrom ankle AROM with better control and less substitution on Dynadisk as compared to the BAPS board   -EC L ankle continues to exhibit increased superficial edema despite his ice use and consistent use stevan Tubigrip. However, his abnormal gait patternn may be chronically increasing his ankle edema. Also today he presents with decreased tibial rotation.  -EC L ankle measured 49 cm, R measured 48.3 cm figure 8 at joint line today. M/L tiibial glide was restricted prior to mobilization. I advised him to increase his ice use over the weekend.  -EC    PT Plan    PT Plan Comments Continue to focus on mobility.  -EC Continue to progress as symptoms allow  -EC Increase aactivities with BAPS as they were difficult for him to control  today.  -EC      User Key  (r) = Recorded By, (t) = Taken By, (c) = Cosigned By    Initials Name Provider Type    LAKESHIA Archibald PTA Physical Therapy Assistant                Modalities       01/18/17 1500          ELECTRICAL STIMULATION    Attended/Unattended Attended  -EC      Stimulation Type --   Laser stim Chronic Inflammation mode  -EC      Location/Electrode Placement/Other medial L ankle  -EC      Rx Minutes 10 mins  -EC        User Key  (r) = Recorded By, (t) = Taken By, (c) = Cosigned By    Initials Name Provider Type    LAKESHIA Archibald PTA Physical Therapy Assistant                Exercises       01/18/17 1500          Subjective Pain    Able to rate subjective pain? yes  -EC      Pre-Treatment Pain Level 0  -EC      Post-Treatment Pain Level 0  -EC      Exercise 1    Exercise Name 1 L DF/PF, supination/pronation, CCW seated with red Dynadisk  -EC      Time (Minutes) 1 2   each  -EC      Exercise 2    Exercise Name 2 issued black T band and instructed, demonstrated and had him demonstrat  -EC        User Key  (r) = Recorded By, (t) = Taken By, (c) = Cosigned By    Initials Name Provider Type    LAKESHIA Archibald PTA Physical Therapy Assistant                        Manual Rx (last 36 hours)      Manual Treatments       01/18/17 1500          Manual Rx 1    Manual Rx 1 Location DFR B plantar surface  -EC      Manual Rx 1 Duration 5   2.5 minutes each  -EC      Manual Rx 2    Manual Rx 2 Location L taleocrural   -EC      Manual Rx 2 Type P/A mobilizations  -EC      Manual Rx 2 Grade 3  -EC      Manual Rx 2 Duration 5  -EC      Manual Rx 3    Manual Rx 3 Location M/L tibia glides  -EC      Manual Rx 3 Grade 3  -EC      Manual Rx 3 Duration 5  -EC      Manual Rx 4    Manual Rx 4 Location L tibia IR glides  -EC      Manual Rx 4 Grade 3  -EC      Manual Rx 4 Duration 5  -EC        User Key  (r) = Recorded By, (t) = Taken By, (c) = Cosigned By    Initials Name Provider Type    LAKESHIA Archibald  PTA Physical Therapy Assistant                PT OP Goals       01/18/17 1600 01/16/17 1455 01/16/17 1400    PT Short Term Goals    STG Date to Achieve 01/27/17  -EC  01/27/17  -EC    STG 1 Patient will improve left ankle DF PROM to 10 degrees  -EC  Patient will improve left ankle DF PROM to 10 degrees  -EC    STG 1 Progress Ongoing  -EC  Ongoing  -EC    STG 2 Patient will improve left ankle DF AROM to 5 degrees without compensation  -EC  Patient will improve left ankle DF AROM to 5 degrees without compensation  -EC    STG 2 Progress Ongoing  -EC  Ongoing  -EC    Long Term Goals    LTG Date to Achieve 02/17/17  -EC  02/17/17  -EC    LTG 1 Patient will be independent with a comprehensive HEP by discharge.  -EC  Patient will be independent with a comprehensive HEP by discharge.  -EC    LTG 1 Progress Ongoing  -EC  Ongoing  -EC    LTG 1 Progress Comments reviewed and updated with self taleocrural mobilization  -EC      LTG 2 Patient will improve gross ankle MMT to at least 4+/5 by discharge.  -EC  Patient will improve gross ankle MMT to at least 4+/5 by discharge.  -EC    LTG 2 Progress Ongoing  -EC  Ongoing  -EC    LTG 3 Patient will be able to ambulate with proper forward weight shifting, avoiding forefoot ER or tibial ER, to promote return to work by discharge.  -EC  Patient will be able to ambulate with proper forward weight shifting, avoiding forefoot ER or tibial ER, to promote return to work by discharge.  -EC    LTG 3 Progress Ongoing  -EC  Ongoing  -EC    LTG 3 Progress Comments   flat foot with absent HS, reduced WS and stance phase on L LE  -EC    LTG 4 Patient will be able to perform a left SLS without compensation at the pelvis for at least 10 seconds with the eyes closed by discharge.  -EC  Patient will be able to perform a left SLS without compensation at the pelvis for at least 10 seconds with the eyes closed by discharge.  -EC    LTG 4 Progress Ongoing  -EC  Ongoing  -EC    LTG 5 Patient will be able  to return to work and perform work specific duties with <3/10 left ankle pain by discharge.  -EC  Patient will be able to return to work and perform work specific duties with <3/10 left ankle pain by discharge.  -EC    LTG 5 Progress Ongoing  -EC  Ongoing  -EC    LTG 5 Progress Comments   reports walks an average of four miles daily at work and includes lifting and carrying things   -EC    Time Calculation    PT Goal Re-Cert Due Date  02/05/17  -EC       01/13/17 1500 01/13/17 1458 01/11/17 1424    PT Short Term Goals    STG Date to Achieve 01/27/17  -EC  01/27/17  -KR    STG 1 Patient will improve left ankle DF PROM to 10 degrees  -EC  Patient will improve left ankle DF PROM to 10 degrees  -KR    STG 1 Progress Ongoing  -EC  Ongoing  -KR    STG 2 Patient will improve left ankle DF AROM to 5 degrees without compensation  -EC  Patient will improve left ankle DF AROM to 5 degrees without compensation  -KR    STG 2 Progress Ongoing  -EC  Ongoing  -KR    STG 2 Progress Comments L ankle exhibited edmea and restricted ROM  -EC      Long Term Goals    LTG Date to Achieve 02/17/17  -EC  02/17/17  -KR    LTG 1 Patient will be independent with a comprehensive HEP by discharge.  -EC  Patient will be independent with a comprehensive HEP by discharge.  -KR    LTG 1 Progress Ongoing  -EC  Ongoing  -KR    LTG 1 Progress Comments   verbalizes compliance with inital.   -KR    LTG 2 Patient will improve gross ankle MMT to at least 4+/5 by discharge.  -EC  Patient will improve gross ankle MMT to at least 4+/5 by discharge.  -KR    LTG 2 Progress Ongoing  -EC  Ongoing  -KR    LTG 2 Progress Comments   still limtied with mobillity.   -KR    LTG 3 Patient will be able to ambulate with proper forward weight shifting, avoiding forefoot ER or tibial ER, to promote return to work by discharge.  -EC  Patient will be able to ambulate with proper forward weight shifting, avoiding forefoot ER or tibial ER, to promote return to work by discharge.   -KR    LTG 3 Progress Ongoing  -EC  Ongoing  -KR    LTG 3 Progress Comments gait pattern is slowly improving   -EC      LTG 4 Patient will be able to perform a left SLS without compensation at the pelvis for at least 10 seconds with the eyes closed by discharge.  -EC  Patient will be able to perform a left SLS without compensation at the pelvis for at least 10 seconds with the eyes closed by discharge.  -KR    LTG 4 Progress Ongoing  -EC  Ongoing  -KR    LTG 5 Patient will be able to return to work and perform work specific duties with <3/10 left ankle pain by discharge.  -EC  Patient will be able to return to work and perform work specific duties with <3/10 left ankle pain by discharge.  -KR    LTG 5 Progress Ongoing  -EC  Ongoing  -KR    Time Calculation    PT Goal Re-Cert Due Date  02/05/17  -EC 02/05/17  -KR      01/06/17 1500          PT Short Term Goals    STG Date to Achieve 01/27/17  -RS      STG 1 Patient will improve left ankle DF PROM to 10 degrees  -RS      STG 1 Progress New  -RS      STG 2 Patient will improve left ankle DF AROM to 5 degrees without compensation  -RS      STG 2 Progress New  -RS      Long Term Goals    LTG Date to Achieve 02/17/17  -RS      LTG 1 Patient will be independent with a comprehensive HEP by discharge.  -RS      LTG 1 Progress New  -RS      LTG 2 Patient will improve gross ankle MMT to at least 4+/5 by discharge.  -RS      LTG 2 Progress New  -RS      LTG 3 Patient will be able to ambulate with proper forward weight shifting, avoiding forefoot ER or tibial ER, to promote return to work by discharge.  -RS      LTG 3 Progress New  -RS      LTG 4 Patient will be able to perform a left SLS without compensation at the pelvis for at least 10 seconds with the eyes closed by discharge.  -RS      LTG 4 Progress New  -RS      LTG 5 Patient will be able to return to work and perform work specific duties with <3/10 left ankle pain by discharge.  -RS      LTG 5 Progress New  -RS       Time Calculation    PT Goal Re-Cert Due Date 02/05/17  -RS        User Key  (r) = Recorded By, (t) = Taken By, (c) = Cosigned By    Initials Name Provider Type    EC Varun Archibald PTA Physical Therapy Assistant    ESVIN Barnett, PT Physical Therapist    RS Ismael Douglass, PT Physical Therapist                Therapy Education       01/18/17 1529    Therapy Education    Given HEP  -EC    Program New   self taleocrural mobilizations w/ black T band   -EC    How Provided Verbal  -EC    Provided to Patient  -EC    Level of Understanding Verbalized  -EC      01/16/17 1643    Therapy Education    Given Symptoms/condition management;HEP  -EC    Program New   2 min rep pillowcase scrunchies  -EC    How Provided Verbal  -EC    Provided to Patient  -EC    Level of Understanding Verbalized  -EC      User Key  (r) = Recorded By, (t) = Taken By, (c) = Cosigned By    Initials Name Provider Type    EC Varun Archibald PTA Physical Therapy Assistant                Time Calculation:        Therapy Charges for Today     Code Description Service Date Service Provider Modifiers Qty    66014917368 HC PT MANUAL THERAPY EA 15 MIN 1/18/2017 Varun Archibald, PTA GP 1    32537036422 HC PT ELEC STIM EA-PER 15 MIN 1/18/2017 Varun Archibald, PTA GP 1    06966113517 HC PT THER PROC EA 15 MIN 1/18/2017 Varun Archibald PTA GP 1                    Varun Archibald, VIC  1/18/2017

## 2017-01-20 ENCOUNTER — HOSPITAL ENCOUNTER (OUTPATIENT)
Dept: PHYSICAL THERAPY | Facility: HOSPITAL | Age: 52
Setting detail: THERAPIES SERIES
Discharge: HOME | End: 2017-01-20
Attending: PODIATRIST

## 2017-01-20 DIAGNOSIS — G89.29 CHRONIC PAIN OF LEFT ANKLE: ICD-10-CM

## 2017-01-20 DIAGNOSIS — M95.8 OSTEOCHONDRAL DEFECT OF ANKLE: Primary | ICD-10-CM

## 2017-01-20 DIAGNOSIS — M25.572 CHRONIC PAIN OF LEFT ANKLE: ICD-10-CM

## 2017-01-20 PROCEDURE — 97110 THERAPEUTIC EXERCISES: CPT | Performed by: PHYSICAL THERAPIST

## 2017-01-20 PROCEDURE — 97140 MANUAL THERAPY 1/> REGIONS: CPT | Performed by: PHYSICAL THERAPIST

## 2017-01-20 NOTE — PROGRESS NOTES
Outpatient Physical Therapy Ortho Treatment Note   [place]     Patient Name: Ismael Huff  : 1965  MRN: [id number]  Today's Date: 2017      Visit Date: 2017    Visit Dx:    ICD-10-CM ICD-9-CM   1. Osteochondral defect of ankle M95.8 738.8   2. Chronic pain of left ankle M25.572 719.47    G89.29 338.29       Patient Active Problem List   Diagnosis   • Osteochondral defect of ankle   • Chronic pain of left ankle        Past Medical History   Diagnosis Date   • Ankle pain, left    • Arthritis    • Gout    • History of kidney stones    • History of pneumonia    • Tendonitis of ankle      left        Past Surgical History   Procedure Laterality Date   • Colonoscopy     • Knee surgery Left      torn cartilage repair   • Colectomy partial / total       polyps ruptured   • Leg excision lesion/cyst Left 2016     Procedure: ANKLE LOOSE BODY EXCISION/REMOVAL, LEFT;  Surgeon: Ajay Comer DPM;  Location: [facility] OR;  Service:    • Ankle surgery Left                              PT Assessment/Plan       17 1457 17 1515 17 1643    PT Assessment    Assessment Comments His L ankle DF improved today, but he is still having decreased motion and strength. His gait was more antalgic today. Educated on working mobiltiy and use of ice at home for swelling. He has more difficulty with inversion/eversion control than DF/PF. His talocrual joint is hypomobile limiting full motion.   -KR L ankle measured 48.8 cm R measured 48.3 cm figure eight form. He was able to perfrom ankle AROM with better control and less substitution on Dynadisk as compared to the BAPS board   -EC L ankle continues to exhibit increased superficial edema despite his ice use and consistent use stevan Tubigrip. However, his abnormal gait patternn may be chronically increasing his ankle edema. Also today he presents with decreased tibial rotation.  -EC    PT Plan    PT Plan Comments Continue to work ankle  mobiltiy, progress with standing activities as motion and strength improved.   -KR Continue to focus on mobility.  -EC Continue to progress as symptoms allow  -EC      01/13/17 1646          PT Assessment    Assessment Comments L ankle measured 49 cm, R measured 48.3 cm figure 8 at joint line today. M/L tiibial glide was restricted prior to mobilization. I advised him to increase his ice use over the weekend.  -EC      PT Plan    PT Plan Comments Increase aactivities with BAPS as they were difficult for him to control today.  -EC        User Key  (r) = Recorded By, (t) = Taken By, (c) = Cosigned By    Initials Name Provider Type    EC Varun Archibald, PTA Physical Therapy Assistant    ESVIN Barnett, PT Physical Therapist                    Exercises       01/20/17 5554          Subjective Comments    Subjective Comments He reports waking up yesterday morning with increased pain. He reports taking tape off this morning. He reports feeling better today.   -KR      Subjective Pain    Able to rate subjective pain? yes  -KR      Pre-Treatment Pain Level 2  -KR      Post-Treatment Pain Level 2  -KR      Subjective Pain Comment L lateral ankle   -KR      Exercise 1    Exercise Name 1 L DF/ PF, env/inv therapist place and hold   -KR      Reps 1 5  -KR      Time (Seconds) 1 5  -KR      Exercise 2    Exercise Name 2 towel scruntches  -KR      Sets 2 2  -KR      Time (Minutes) 2 2  -KR      Exercise 3    Exercise Name 3 BAPS DF/PF, INV/EV (therapist assist) L2  (fatigued)  -KR      Sets 3 3  -KR      Time (Minutes) 3 1  -KR      Exercise 4    Exercise Name 4 wobble boad DF/PF   -KR      Sets 4 3  -KR        User Key  (r) = Recorded By, (t) = Taken By, (c) = Cosigned By    Initials Name Provider Type    ESVIN Barnett, PT Physical Therapist                        Manual Rx (last 36 hours)      Manual Treatments       01/20/17 0057          Manual Rx 1    Manual Rx 1 Location L gastroc/soleus  -KR      Manual Rx  1 Type IASTM with edge tool  -KR      Manual Rx 1 Duration 7  -KR      Manual Rx 2    Manual Rx 2 Location L talocrual   -KR      Manual Rx 2 Type A-P  -KR      Manual Rx 2 Grade 3  -KR      Manual Rx 2 Duration 5  -KR      Manual Rx 3    Manual Rx 3 Location talocrual distraction   -KR      Manual Rx 3 Duration 3  -KR        User Key  (r) = Recorded By, (t) = Taken By, (c) = Cosigned By    Initials Name Provider Type    ESVIN Barnett, PT Physical Therapist                PT OP Goals       01/20/17 1457 01/18/17 1600 01/16/17 1455    PT Short Term Goals    STG Date to Achieve 01/27/17  -KR 01/27/17  -EC     STG 1 Patient will improve left ankle DF PROM to 10 degrees  -KR Patient will improve left ankle DF PROM to 10 degrees  -EC     STG 1 Progress Ongoing  -KR Ongoing  -EC     STG 2 Patient will improve left ankle DF AROM to 5 degrees without compensation  -KR Patient will improve left ankle DF AROM to 5 degrees without compensation  -EC     STG 2 Progress Ongoing  -KR Ongoing  -EC     STG 2 Progress Comments 0 AROM  -KR      Long Term Goals    LTG Date to Achieve 02/17/17  -KR 02/17/17  -EC     LTG 1 Patient will be independent with a comprehensive HEP by discharge.  -KR Patient will be independent with a comprehensive HEP by discharge.  -EC     LTG 1 Progress Ongoing  -KR Ongoing  -EC     LTG 1 Progress Comments  reviewed and updated with self taleocrural mobilization  -EC     LTG 2 Patient will improve gross ankle MMT to at least 4+/5 by discharge.  -KR Patient will improve gross ankle MMT to at least 4+/5 by discharge.  -EC     LTG 2 Progress Ongoing  -KR Ongoing  -EC     LTG 3 Patient will be able to ambulate with proper forward weight shifting, avoiding forefoot ER or tibial ER, to promote return to work by discharge.  -KR Patient will be able to ambulate with proper forward weight shifting, avoiding forefoot ER or tibial ER, to promote return to work by discharge.  -EC     LTG 3 Progress Ongoing  -KR  Ongoing  -EC     LTG 4 Patient will be able to perform a left SLS without compensation at the pelvis for at least 10 seconds with the eyes closed by discharge.  -KR Patient will be able to perform a left SLS without compensation at the pelvis for at least 10 seconds with the eyes closed by discharge.  -EC     LTG 4 Progress Ongoing  -KR Ongoing  -EC     LTG 5 Patient will be able to return to work and perform work specific duties with <3/10 left ankle pain by discharge.  -KR Patient will be able to return to work and perform work specific duties with <3/10 left ankle pain by discharge.  -EC     LTG 5 Progress Ongoing  -KR Ongoing  -EC     Time Calculation    PT Goal Re-Cert Due Date 02/05/17  -KR  02/05/17  -EC      01/16/17 1400 01/13/17 1500 01/13/17 1458    PT Short Term Goals    STG Date to Achieve 01/27/17  -EC 01/27/17  -EC     STG 1 Patient will improve left ankle DF PROM to 10 degrees  -EC Patient will improve left ankle DF PROM to 10 degrees  -EC     STG 1 Progress Ongoing  -EC Ongoing  -EC     STG 2 Patient will improve left ankle DF AROM to 5 degrees without compensation  -EC Patient will improve left ankle DF AROM to 5 degrees without compensation  -EC     STG 2 Progress Ongoing  -EC Ongoing  -EC     STG 2 Progress Comments  L ankle exhibited edmea and restricted ROM  -EC     Long Term Goals    LTG Date to Achieve 02/17/17  -EC 02/17/17  -EC     LTG 1 Patient will be independent with a comprehensive HEP by discharge.  -EC Patient will be independent with a comprehensive HEP by discharge.  -EC     LTG 1 Progress Ongoing  -EC Ongoing  -EC     LTG 2 Patient will improve gross ankle MMT to at least 4+/5 by discharge.  -EC Patient will improve gross ankle MMT to at least 4+/5 by discharge.  -EC     LTG 2 Progress Ongoing  -EC Ongoing  -EC     LTG 3 Patient will be able to ambulate with proper forward weight shifting, avoiding forefoot ER or tibial ER, to promote return to work by discharge.  -EC Patient will  be able to ambulate with proper forward weight shifting, avoiding forefoot ER or tibial ER, to promote return to work by discharge.  -EC     LTG 3 Progress Ongoing  -EC Ongoing  -EC     LTG 3 Progress Comments flat foot with absent HS, reduced WS and stance phase on L LE  -EC gait pattern is slowly improving   -EC     LTG 4 Patient will be able to perform a left SLS without compensation at the pelvis for at least 10 seconds with the eyes closed by discharge.  -EC Patient will be able to perform a left SLS without compensation at the pelvis for at least 10 seconds with the eyes closed by discharge.  -EC     LTG 4 Progress Ongoing  -EC Ongoing  -EC     LTG 5 Patient will be able to return to work and perform work specific duties with <3/10 left ankle pain by discharge.  -EC Patient will be able to return to work and perform work specific duties with <3/10 left ankle pain by discharge.  -EC     LTG 5 Progress Ongoing  -EC Ongoing  -EC     LTG 5 Progress Comments reports walks an average of four miles daily at work and includes lifting and carrying things   -EC      Time Calculation    PT Goal Re-Cert Due Date   02/05/17  -EC      01/11/17 1424          PT Short Term Goals    STG Date to Achieve 01/27/17  -KR      STG 1 Patient will improve left ankle DF PROM to 10 degrees  -KR      STG 1 Progress Ongoing  -KR      STG 2 Patient will improve left ankle DF AROM to 5 degrees without compensation  -KR      STG 2 Progress Ongoing  -KR      Long Term Goals    LTG Date to Achieve 02/17/17  -KR      LTG 1 Patient will be independent with a comprehensive HEP by discharge.  -KR      LTG 1 Progress Ongoing  -KR      LTG 1 Progress Comments verbalizes compliance with inital.   -KR      LTG 2 Patient will improve gross ankle MMT to at least 4+/5 by discharge.  -KR      LTG 2 Progress Ongoing  -KR      LTG 2 Progress Comments still limtied with mobillity.   -KR      LTG 3 Patient will be able to ambulate with proper forward weight  shifting, avoiding forefoot ER or tibial ER, to promote return to work by discharge.  -KR      LTG 3 Progress Ongoing  -KR      LTG 4 Patient will be able to perform a left SLS without compensation at the pelvis for at least 10 seconds with the eyes closed by discharge.  -KR      LTG 4 Progress Ongoing  -KR      LTG 5 Patient will be able to return to work and perform work specific duties with <3/10 left ankle pain by discharge.  -KR      LTG 5 Progress Ongoing  -KR      Time Calculation    PT Goal Re-Cert Due Date 02/05/17  -KR        User Key  (r) = Recorded By, (t) = Taken By, (c) = Cosigned By    Initials Name Provider Type    EC Varun Archibald PTA Physical Therapy Assistant    ESVIN Barnett, PT Physical Therapist                Therapy Education       01/20/17 1457    Therapy Education    Given HEP;Symptoms/condition management;Posture/body mechanics  -KR    Program Reinforced  -KR    How Provided Verbal  -KR    Provided to Patient  -KR    Level of Understanding Verbalized  -KR      01/18/17 1529    Therapy Education    Given HEP  -EC    Program New   self taleocrural mobilizations w/ black T band   -EC    How Provided Verbal  -EC    Provided to Patient  -EC    Level of Understanding Verbalized  -EC      01/16/17 1643    Therapy Education    Given Symptoms/condition management;HEP  -EC    Program New   2 min rep pillowcase scrunchies  -EC    How Provided Verbal  -EC    Provided to Patient  -EC    Level of Understanding Verbalized  -EC      User Key  (r) = Recorded By, (t) = Taken By, (c) = Cosigned By    Initials Name Provider Type    EC Varun Archibald PTA Physical Therapy Assistant    ESVIN Barnett, PT Physical Therapist                Time Calculation:   Start Time: 1457  Stop Time: 1546  Time Calculation (min): 49 min  Total Timed Code Minutes- PT: 49 minute(s)    Therapy Charges for Today     Code Description Service Date Service Provider Modifiers Qty    05086425733  PT THER PROC EA  15 MIN 1/20/2017 Allyn Barnett, PT GP 2    [id number]  PT MANUAL THERAPY EA 15 MIN 1/20/2017 Allyn Barnett, PT GP 1                    Allyn Barnett, PT  1/20/2017

## 2017-01-23 ENCOUNTER — HOSPITAL ENCOUNTER (OUTPATIENT)
Dept: PHYSICAL THERAPY | Facility: HOSPITAL | Age: 52
Setting detail: THERAPIES SERIES
Discharge: HOME | End: 2017-01-23
Attending: PODIATRIST

## 2017-01-23 ENCOUNTER — OFFICE VISIT (OUTPATIENT)
Dept: PODIATRY | Facility: CLINIC | Age: 52
End: 2017-01-23

## 2017-01-23 VITALS
DIASTOLIC BLOOD PRESSURE: 62 MMHG | HEART RATE: 80 BPM | HEIGHT: 72 IN | SYSTOLIC BLOOD PRESSURE: 112 MMHG | WEIGHT: 197 LBS | BODY MASS INDEX: 26.68 KG/M2

## 2017-01-23 DIAGNOSIS — M25.572 CHRONIC PAIN OF LEFT ANKLE: Primary | ICD-10-CM

## 2017-01-23 DIAGNOSIS — G89.29 CHRONIC PAIN OF LEFT ANKLE: Primary | ICD-10-CM

## 2017-01-23 DIAGNOSIS — M95.8 OSTEOCHONDRAL DEFECT OF ANKLE: ICD-10-CM

## 2017-01-23 PROCEDURE — 97110 THERAPEUTIC EXERCISES: CPT | Performed by: PHYSICAL THERAPIST

## 2017-01-23 PROCEDURE — 97140 MANUAL THERAPY 1/> REGIONS: CPT | Performed by: PHYSICAL THERAPIST

## 2017-01-23 PROCEDURE — 99024 POSTOP FOLLOW-UP VISIT: CPT | Performed by: PODIATRIST

## 2017-01-23 NOTE — PATIENT INSTRUCTIONS
Ankle Pain  Ankle pain is a common symptom. The bones, cartilage, tendons, and muscles of the ankle joint perform a lot of work each day. The ankle joint holds your body weight and allows you to move around. Ankle pain can occur on either side or back of 1 or both ankles. Ankle pain may be sharp and burning or dull and aching. There may be tenderness, stiffness, redness, or warmth around the ankle. The pain occurs more often when a person walks or puts pressure on the ankle.  CAUSES   There are many reasons ankle pain can develop. It is important to work with your caregiver to identify the cause since many conditions can impact the bones, cartilage, muscles, and tendons. Causes for ankle pain include:  · Injury, including a break (fracture), sprain, or strain often due to a fall, sports, or a high-impact activity.  · Swelling (inflammation) of a tendon (tendonitis).  · Achilles tendon rupture.  · Ankle instability after repeated sprains and strains.  · Poor foot alignment.  · Pressure on a nerve (tarsal tunnel syndrome).  · Arthritis in the ankle or the lining of the ankle.  · Crystal formation in the ankle (gout or pseudogout).  DIAGNOSIS   A diagnosis is based on your medical history, your symptoms, results of your physical exam, and results of diagnostic tests. Diagnostic tests may include X-ray exams or a computerized magnetic scan (magnetic resonance imaging, MRI).  TREATMENT   Treatment will depend on the cause of your ankle pain and may include:  · Keeping pressure off the ankle and limiting activities.  · Using crutches or other walking support (a cane or brace).  · Using rest, ice, compression, and elevation.  · Participating in physical therapy or home exercises.  · Wearing shoe inserts or special shoes.  · Losing weight.  · Taking medications to reduce pain or swelling or receiving an injection.  · Undergoing surgery.  HOME CARE INSTRUCTIONS   · Only take over-the-counter or prescription medicines for  pain, discomfort, or fever as directed by your caregiver.  · Put ice on the injured area.    Put ice in a plastic bag.    Place a towel between your skin and the bag.    Leave the ice on for 15-20 minutes at a time, 03-04 times a day.  · Keep your leg raised (elevated) when possible to lessen swelling.  · Avoid activities that cause ankle pain.  · Follow specific exercises as directed by your caregiver.  · Record how often you have ankle pain, the location of the pain, and what it feels like. This information may be helpful to you and your caregiver.  · Ask your caregiver about returning to work or sports and whether you should drive.  · Follow up with your caregiver for further examination, therapy, or testing as directed.  SEEK MEDICAL CARE IF:   · Pain or swelling continues or worsens beyond 1 week.  · You have an oral temperature above 102° F (38.9° C).  · You are feeling unwell or have chills.  · You are having an increasingly difficult time with walking.  · You have loss of sensation or other new symptoms.  · You have questions or concerns.  MAKE SURE YOU:   · Understand these instructions.  · Will watch your condition.  · Will get help right away if you are not doing well or get worse.     This information is not intended to replace advice given to you by your health care provider. Make sure you discuss any questions you have with your health care provider.     Document Released: 06/07/2011 Document Revised: 03/11/2013 Document Reviewed: 07/19/2016  [employer] Interactive Patient Education ©2016 [employer]

## 2017-01-23 NOTE — PROGRESS NOTES
Outpatient Physical Therapy Ortho Treatment Note  [facility]     Patient Name: Ismael Huff  : 1965  MRN: [id number]  Today's Date: 2017      Visit Date: 2017    Visit Dx:    ICD-10-CM ICD-9-CM   1. Chronic pain of left ankle M25.572 719.47    G89.29 338.29       Patient Active Problem List   Diagnosis   • Osteochondral defect of ankle   • Chronic pain of left ankle        Past Medical History   Diagnosis Date   • Ankle pain, left    • Arthritis    • Gout    • History of kidney stones    • History of pneumonia    • Tendonitis of ankle      left        Past Surgical History   Procedure Laterality Date   • Colonoscopy     • Knee surgery Left      torn cartilage repair   • Colectomy partial / total       polyps ruptured   • Leg excision lesion/cyst Left 2016     Procedure: ANKLE LOOSE BODY EXCISION/REMOVAL, LEFT;  Surgeon: Ajay Comer DPM;  Location: [facility];  Service:    • Ankle surgery Left                              PT Assessment/Plan       17 1600 17 1457 17 1515    PT Assessment    Assessment Comments His motion is consistently improving. He is eager to return to work. He is severely weak in his L hip due to the longstanding nature of his injury and the compensations that he had gotten used to. He will most likely not get full ROM of the ankle but he should continue to progress. Need to focus on more stability and hip strength to allow him to return to work safely. MD has told him he could go back to work as soon as PT released him.  -HR His L ankle DF improved today, but he is still having decreased motion and strength. His gait was more antalgic today. Educated on working mobiltiy and use of ice at home for swelling. He has more difficulty with inversion/eversion control than DF/PF. His talocrual joint is hypomobile limiting full motion.   -KR L ankle measured 48.8 cm R measured 48.3 cm figure eight form. He was able to perfrom ankle AROM with  better control and less substitution on Dynadisk as compared to the BAPS board   -EC    PT Plan    PT Plan Comments Continue focus on ROM with addition of more proprioception and strengthening throughout LE to allow return to work.  -HR Continue to work ankle mobiltiy, progress with standing activities as motion and strength improved.   -KR Continue to focus on mobility.  -EC      01/16/17 1643          PT Assessment    Assessment Comments L ankle continues to exhibit increased superficial edema despite his ice use and consistent use stevan Tubigrip. However, his abnormal gait patternn may be chronically increasing his ankle edema. Also today he presents with decreased tibial rotation.  -EC      PT Plan    PT Plan Comments Continue to progress as symptoms allow  -EC        User Key  (r) = Recorded By, (t) = Taken By, (c) = Cosigned By    Initials Name Provider Type    EC Varun Archibald, PTA Physical Therapy Assistant    ESVIN Barnett, PT Physical Therapist    HR Arline Alvarez, PT Physical Therapist                    Exercises       01/23/17 1600          Subjective Comments    Subjective Comments He has felt much better over the weekend as he got new tennis shoes. He is not having much if any pain  -HR      Subjective Pain    Able to rate subjective pain? yes  -HR      Pre-Treatment Pain Level 0  -HR      Post-Treatment Pain Level 0  -HR      Exercise 1    Exercise Name 1 L DF/ PF, env/inv therapist place and hold   -HR      Equipment 1 --   BAPS L2  -HR      Reps 1 5  -HR      Time (Seconds) 1 5  -HR      Exercise 2    Exercise Name 2 R clam shells  -HR      Cueing 2 Verbal;Tactile  -HR      Sets 2 1  -HR      Reps 2 5  -HR        User Key  (r) = Recorded By, (t) = Taken By, (c) = Cosigned By    Initials Name Provider Type     Arline Alvarez, PT Physical Therapist                        Manual Rx (last 36 hours)      Manual Treatments       01/23/17 1500          Manual Rx 1    Manual Rx 1  Location L gastroc/soleus  -HR      Manual Rx 1 Type STM   focus on fibrotic changes along lateral lower leg  -HR      Manual Rx 1 Duration 15  -HR      Manual Rx 2    Manual Rx 2 Location L talocrual   -HR      Manual Rx 2 Type A-P  -HR      Manual Rx 2 Grade 3  -HR      Manual Rx 2 Duration 5  -HR      Manual Rx 3    Manual Rx 3 Location talocrual distraction   -HR      Manual Rx 3 Duration 5  -HR        User Key  (r) = Recorded By, (t) = Taken By, (c) = Cosigned By    Initials Name Provider Type    HR Arline Alvarez, PT Physical Therapist                PT OP Goals       01/23/17 1600 01/20/17 1457 01/18/17 1600    PT Short Term Goals    STG Date to Achieve 01/27/17  -HR 01/27/17  -KR 01/27/17  -EC    STG 1 Patient will improve left ankle DF PROM to 10 degrees  -HR Patient will improve left ankle DF PROM to 10 degrees  -KR Patient will improve left ankle DF PROM to 10 degrees  -EC    STG 1 Progress Ongoing  -HR Ongoing  -KR Ongoing  -EC    STG 1 Progress Comments 5  -HR      STG 2 Patient will improve left ankle DF AROM to 5 degrees without compensation  -HR Patient will improve left ankle DF AROM to 5 degrees without compensation  -KR Patient will improve left ankle DF AROM to 5 degrees without compensation  -EC    STG 2 Progress Ongoing  -HR Ongoing  -KR Ongoing  -EC    STG 2 Progress Comments 0-1  -HR 0 AROM  -KR     Long Term Goals    LTG Date to Achieve 02/17/17  -HR 02/17/17  -KR 02/17/17  -EC    LTG 1 Patient will be independent with a comprehensive HEP by discharge.  -HR Patient will be independent with a comprehensive HEP by discharge.  -KR Patient will be independent with a comprehensive HEP by discharge.  -EC    LTG 1 Progress Ongoing  -HR Ongoing  -KR Ongoing  -EC    LTG 1 Progress Comments added clam shells to begin more hip strengthening  -HR  reviewed and updated with self taleocrural mobilization  -EC    LTG 2 Patient will improve gross ankle MMT to at least 4+/5 by discharge.  -HR Patient  will improve gross ankle MMT to at least 4+/5 by discharge.  -KR Patient will improve gross ankle MMT to at least 4+/5 by discharge.  -EC    LTG 2 Progress Ongoing  -HR Ongoing  -KR Ongoing  -EC    LTG 3 Patient will be able to ambulate with proper forward weight shifting, avoiding forefoot ER or tibial ER, to promote return to work by discharge.  -HR Patient will be able to ambulate with proper forward weight shifting, avoiding forefoot ER or tibial ER, to promote return to work by discharge.  -KR Patient will be able to ambulate with proper forward weight shifting, avoiding forefoot ER or tibial ER, to promote return to work by discharge.  -EC    LTG 3 Progress Ongoing  -HR Ongoing  -KR Ongoing  -EC    LTG 4 Patient will be able to perform a left SLS without compensation at the pelvis for at least 10 seconds with the eyes closed by discharge.  -HR Patient will be able to perform a left SLS without compensation at the pelvis for at least 10 seconds with the eyes closed by discharge.  -KR Patient will be able to perform a left SLS without compensation at the pelvis for at least 10 seconds with the eyes closed by discharge.  -EC    LTG 4 Progress Ongoing  -HR Ongoing  -KR Ongoing  -EC    LTG 4 Progress Comments unable  -HR      LTG 5 Patient will be able to return to work and perform work specific duties with <3/10 left ankle pain by discharge.  -HR Patient will be able to return to work and perform work specific duties with <3/10 left ankle pain by discharge.  -KR Patient will be able to return to work and perform work specific duties with <3/10 left ankle pain by discharge.  -EC    LTG 5 Progress Ongoing  -HR Ongoing  -KR Ongoing  -EC    LTG 5 Progress Comments wants to return to work ASAP  -HR      Time Calculation    PT Goal Re-Cert Due Date 02/03/17  -HR 02/05/17  -KR       01/16/17 1455 01/16/17 1400 01/13/17 1500    PT Short Term Goals    STG Date to Achieve  01/27/17  -EC 01/27/17  -EC    STG 1  Patient will  improve left ankle DF PROM to 10 degrees  -EC Patient will improve left ankle DF PROM to 10 degrees  -EC    STG 1 Progress  Ongoing  -EC Ongoing  -EC    STG 2  Patient will improve left ankle DF AROM to 5 degrees without compensation  -EC Patient will improve left ankle DF AROM to 5 degrees without compensation  -EC    STG 2 Progress  Ongoing  -EC Ongoing  -EC    STG 2 Progress Comments   L ankle exhibited edmea and restricted ROM  -EC    Long Term Goals    LTG Date to Achieve  02/17/17  -EC 02/17/17  -EC    LTG 1  Patient will be independent with a comprehensive HEP by discharge.  -EC Patient will be independent with a comprehensive HEP by discharge.  -EC    LTG 1 Progress  Ongoing  -EC Ongoing  -EC    LTG 2  Patient will improve gross ankle MMT to at least 4+/5 by discharge.  -EC Patient will improve gross ankle MMT to at least 4+/5 by discharge.  -EC    LTG 2 Progress  Ongoing  -EC Ongoing  -EC    LTG 3  Patient will be able to ambulate with proper forward weight shifting, avoiding forefoot ER or tibial ER, to promote return to work by discharge.  -EC Patient will be able to ambulate with proper forward weight shifting, avoiding forefoot ER or tibial ER, to promote return to work by discharge.  -EC    LTG 3 Progress  Ongoing  -EC Ongoing  -EC    LTG 3 Progress Comments  flat foot with absent HS, reduced WS and stance phase on L LE  -EC gait pattern is slowly improving   -EC    LTG 4  Patient will be able to perform a left SLS without compensation at the pelvis for at least 10 seconds with the eyes closed by discharge.  -EC Patient will be able to perform a left SLS without compensation at the pelvis for at least 10 seconds with the eyes closed by discharge.  -EC    LTG 4 Progress  Ongoing  -EC Ongoing  -EC    LTG 5  Patient will be able to return to work and perform work specific duties with <3/10 left ankle pain by discharge.  -EC Patient will be able to return to work and perform work specific duties with <3/10  left ankle pain by discharge.  -EC    LTG 5 Progress  Ongoing  -EC Ongoing  -EC    LTG 5 Progress Comments  reports walks an average of four miles daily at work and includes lifting and carrying things   -EC     Time Calculation    PT Goal Re-Cert Due Date 02/05/17  -EC        01/13/17 1458 01/11/17 1424       PT Short Term Goals    STG Date to Achieve  01/27/17  -KR     STG 1  Patient will improve left ankle DF PROM to 10 degrees  -KR     STG 1 Progress  Ongoing  -KR     STG 2  Patient will improve left ankle DF AROM to 5 degrees without compensation  -KR     STG 2 Progress  Ongoing  -KR     Long Term Goals    LTG Date to Achieve  02/17/17  -KR     LTG 1  Patient will be independent with a comprehensive HEP by discharge.  -KR     LTG 1 Progress  Ongoing  -KR     LTG 1 Progress Comments  verbalizes compliance with inital.   -KR     LTG 2  Patient will improve gross ankle MMT to at least 4+/5 by discharge.  -KR     LTG 2 Progress  Ongoing  -KR     LTG 2 Progress Comments  still limtied with mobillity.   -KR     LTG 3  Patient will be able to ambulate with proper forward weight shifting, avoiding forefoot ER or tibial ER, to promote return to work by discharge.  -KR     LTG 3 Progress  Ongoing  -KR     LTG 4  Patient will be able to perform a left SLS without compensation at the pelvis for at least 10 seconds with the eyes closed by discharge.  -KR     LTG 4 Progress  Ongoing  -KR     LTG 5  Patient will be able to return to work and perform work specific duties with <3/10 left ankle pain by discharge.  -KR     LTG 5 Progress  Ongoing  -KR     Time Calculation    PT Goal Re-Cert Due Date 02/05/17  -EC 02/05/17  -KR       User Key  (r) = Recorded By, (t) = Taken By, (c) = Cosigned By    Initials Name Provider Type    LAKESHIA Archibald, PTA Physical Therapy Assistant    ESVIN Barnett, PT Physical Therapist    HR Arline Alvarez, PT Physical Therapist                Therapy Education       01/20/17 2167     Therapy Education    Given HEP;Symptoms/condition management;Posture/body mechanics  -KR    Program Reinforced  -KR    How Provided Verbal  -KR    Provided to Patient  -KR    Level of Understanding Verbalized  -KR      01/18/17 1529    Therapy Education    Given HEP  -EC    Program New   self taleocrural mobilizations w/ black T band   -EC    How Provided Verbal  -EC    Provided to Patient  -EC    Level of Understanding Verbalized  -EC      01/16/17 1643    Therapy Education    Given Symptoms/condition management;HEP  -EC    Program New   2 min rep pillowcase scrunchies  -EC    How Provided Verbal  -EC    Provided to Patient  -EC    Level of Understanding Verbalized  -EC      User Key  (r) = Recorded By, (t) = Taken By, (c) = Cosigned By    Initials Name Provider Type    EC Varun Archibald, VIC Physical Therapy Assistant    ESVIN Barnett, PT Physical Therapist                Time Calculation:   Start Time: 1530  Stop Time: 1615  Time Calculation (min): 45 min  Total Timed Code Minutes- PT: 45 minute(s)    Therapy Charges for Today     Code Description Service Date Service Provider Modifiers Qty    78256120188 HC PT MANUAL THERAPY EA 15 MIN 1/23/2017 Arline Alvarez, PT GP 2    93204639647 HC PT THER PROC EA 15 MIN 1/23/2017 Arline Alvarez, PT GP 1                    Arline Alvarez, PT  1/23/2017

## 2017-01-23 NOTE — MR AVS SNAPSHOT
Ismael Huff   1/23/2017 1:00 PM   Office Visit    Dept Phone:  [phone]   Encounter #:  [id number]    Provider:  Ajay Comer DPM   Department:  [facility] PODIATRY                Your Full Care Plan              Your Updated Medication List          This list is accurate as of: 1/23/17  1:34 PM.  Always use your most recent med list.                Crutch Set misc   1 Device Daily.       docusate sodium 100 MG capsule   Commonly known as:  COLACE   Take 1 capsule by mouth Daily.       oxyCODONE-acetaminophen 5-325 MG per tablet   Commonly known as:  PERCOCET   Take 1-2 tablets by mouth Every 4 (Four) Hours As Needed (Pain).       promethazine 12.5 MG tablet   Commonly known as:  PHENERGAN   Take 1 tablet by mouth Every 8 (Eight) Hours As Needed for nausea or vomiting.               You Were Diagnosed With        Codes Comments    Chronic pain of left ankle    -  Primary ICD-10-CM: M25.572, G89.29  ICD-9-CM: 719.47, 338.29     Osteochondral defect of ankle     ICD-10-CM: M95.8  ICD-9-CM: 738.8       Instructions    Ankle Pain  Ankle pain is a common symptom. The bones, cartilage, tendons, and muscles of the ankle joint perform a lot of work each day. The ankle joint holds your body weight and allows you to move around. Ankle pain can occur on either side or back of 1 or both ankles. Ankle pain may be sharp and burning or dull and aching. There may be tenderness, stiffness, redness, or warmth around the ankle. The pain occurs more often when a person walks or puts pressure on the ankle.  CAUSES   There are many reasons ankle pain can develop. It is important to work with your caregiver to identify the cause since many conditions can impact the bones, cartilage, muscles, and tendons. Causes for ankle pain include:  · Injury, including a break (fracture), sprain, or strain often due to a fall, sports, or a high-impact activity.  · Swelling (inflammation) of a  tendon (tendonitis).  · Achilles tendon rupture.  · Ankle instability after repeated sprains and strains.  · Poor foot alignment.  · Pressure on a nerve (tarsal tunnel syndrome).  · Arthritis in the ankle or the lining of the ankle.  · Crystal formation in the ankle (gout or pseudogout).  DIAGNOSIS   A diagnosis is based on your medical history, your symptoms, results of your physical exam, and results of diagnostic tests. Diagnostic tests may include X-ray exams or a computerized magnetic scan (magnetic resonance imaging, MRI).  TREATMENT   Treatment will depend on the cause of your ankle pain and may include:  · Keeping pressure off the ankle and limiting activities.  · Using crutches or other walking support (a cane or brace).  · Using rest, ice, compression, and elevation.  · Participating in physical therapy or home exercises.  · Wearing shoe inserts or special shoes.  · Losing weight.  · Taking medications to reduce pain or swelling or receiving an injection.  · Undergoing surgery.  HOME CARE INSTRUCTIONS   · Only take over-the-counter or prescription medicines for pain, discomfort, or fever as directed by your caregiver.  · Put ice on the injured area.    Put ice in a plastic bag.    Place a towel between your skin and the bag.    Leave the ice on for 15-20 minutes at a time, 03-04 times a day.  · Keep your leg raised (elevated) when possible to lessen swelling.  · Avoid activities that cause ankle pain.  · Follow specific exercises as directed by your caregiver.  · Record how often you have ankle pain, the location of the pain, and what it feels like. This information may be helpful to you and your caregiver.  · Ask your caregiver about returning to work or sports and whether you should drive.  · Follow up with your caregiver for further examination, therapy, or testing as directed.  SEEK MEDICAL CARE IF:   · Pain or swelling continues or worsens beyond 1 week.  · You have an oral temperature above 102° F  (38.9° C).  · You are feeling unwell or have chills.  · You are having an increasingly difficult time with walking.  · You have loss of sensation or other new symptoms.  · You have questions or concerns.  MAKE SURE YOU:   · Understand these instructions.  · Will watch your condition.  · Will get help right away if you are not doing well or get worse.     This information is not intended to replace advice given to you by your health care provider. Make sure you discuss any questions you have with your health care provider.     Document Released: 06/07/2011 Document Revised: 03/11/2013 Document Reviewed: 07/19/2016  [employer] Interactive Patient Education ©2016 [employer]       Patient Instructions History      Upcoming Appointments     Visit Type Date Time Department    FOLLOW UP 1/23/2017  1:00 PM [facility]    TREATMENT 1/23/2017  3:15 PM  [facility]    TREATMENT 1/27/2017  1:45 PM  [facility]    TREATMENT 1/31/2017  2:30 PM  [facility]      MyChart Signup     Our records indicate that you have declined [facility] [employer] signup. If you would like to sign up for [employer], please email [email or web address] or call [phone] to obtain an activation code.             Other Info from Your Visit           Your Appointments     Jan 23, 2017  3:15 PM CST   Therapy Treatment with Arline Alvarez, PT   [facility] ([place])    [address] [id number] [phone]            Jan 27, 2017  1:45 PM CST   Therapy Treatment with Rekha Garvey, PT   [facility] ([place])    [address] [place] [id number] [phone]            Jan 31, 2017  2:30 PM CST   Therapy Treatment with Rekha Garvey, PT   [facility] ([place])    [address] [id number] [phone]              Allergies     Reglan [Metoclopramide] Allergy Anaphylaxis    Allopurinol Allergy Rash    Keflex  "[Cephalexin] Allergy Rash      Reason for Visit     Left Foot - Pain, Follow-up Doing much better      Vital Signs     Blood Pressure Pulse Height Weight Body Mass Index Smoking Status    112/62 80 72\" (182.9 cm) 197 lb (89.4 kg) 26.72 kg/m2 Never Smoker      Problems and Diagnoses Noted     Chronic pain of left ankle    Osteochondral defect of ankle        "

## 2017-01-23 NOTE — PROGRESS NOTES
Today's Date: 01/23/2017    Ismael Huff  MRN: [id number]  CSN: [id number]  PCP: Edouard Madrigal, DO      SUBJECTIVE     Chief Complaint   Patient presents with   • Left Foot - Pain, Follow-up     Doing much better     HPI: Ismael Huff, a 51 y.o.male, presents to clinic as a(n) established patient 8 weeks s/p left ankle OCD debridement. Relates that since his last visit he has transitioned from his CAM boot and started with physical therapy. He was approved for 8 sessions of which he has completed 5. States he has noticed significant improvement. Denies significant pain in his ankle but relates he is having difficulty returning to a regular gait which PT has been helping with. Overall pt explains he is happy with the surgery. Denies any constitutional symptoms. No other pedal complaints at this time.    Past Medical History   Diagnosis Date   • Ankle pain, left    • Arthritis    • Gout    • History of kidney stones    • History of pneumonia    • Tendonitis of ankle      left       Past Surgical History   Procedure Laterality Date   • Colonoscopy     • Knee surgery Left 1990s     torn cartilage repair   • Colectomy partial / total  2011     polyps ruptured   • Leg excision lesion/cyst Left 11/22/2016     Procedure: ANKLE LOOSE BODY EXCISION/REMOVAL, LEFT;  Surgeon: Ajay Comer DPM;  Location: [facility] OR;  Service:    • Ankle surgery Left        Family History   Problem Relation Age of Onset   • Cancer Mother    • Diabetes Mother    • Heart disease Father    • Cancer Father    • Hypertension Father    • Arthritis Father    • Stroke Father    • Hyperlipidemia Father        Social History     Social History   • Marital status:      Spouse name: N/A   • Number of children: N/A   • Years of education: N/A     Occupational History   • Not on file.     Social History Main Topics   • Smoking status: Never Smoker   • Smokeless tobacco: Former User     Types: Snuff     Quit date: 2016      Comment: ~40  YEARS   • Alcohol use No   • Drug use: No   • Sexual activity: Defer     Other Topics Concern   • Not on file     Social History Narrative       Allergies   Allergen Reactions   • Reglan [Metoclopramide] Anaphylaxis   • Allopurinol Rash   • Keflex [Cephalexin] Rash       Prior to Admission medications    Medication Sig Start Date End Date Taking? Authorizing Provider   docusate sodium (COLACE) 100 MG capsule Take 1 capsule by mouth Daily. 11/22/16   Ajay Comer DPM   Misc. Devices (CRUTCH SET) misc 1 Device Daily. 11/22/16   Ajay Comer DPM   oxyCODONE-acetaminophen (PERCOCET) 5-325 MG per tablet Take 1-2 tablets by mouth Every 4 (Four) Hours As Needed (Pain). 11/22/16   Ajay Comer DPM   promethazine (PHENERGAN) 12.5 MG tablet Take 1 tablet by mouth Every 8 (Eight) Hours As Needed for nausea or vomiting. 11/22/16   Ajay Comer DPM       Review of Systems   Constitutional: Negative for chills and fever.   HENT: Negative for congestion.    Respiratory: Negative for shortness of breath.    Cardiovascular: Negative for chest pain.   Gastrointestinal: Negative for constipation, diarrhea, nausea and vomiting.   Musculoskeletal:        Left ankle pain   Skin:        Healed surgical incision   Neurological: Negative for numbness.       OBJECTIVE     Vitals:    01/23/17 1315   BP: 112/62   Pulse: 80       PHYSICAL EXAM  GEN:   A&Ox3, NAD. Pt ambulates to clinic without assistance and wearing regular shoes with left compression sleeve. Pt has mild antalgic gait to left.    NEURO:   Protective sensation intact to 10/10 sites Right foot, 10/10 site Left foot using [place] monofilament  Light touch sensation intact.  No Tinel's or Villeux sign.     VASC:  Skin temperature warm to warm proximal to distal odilon  DP pulses 2/4 Right, 2/4 Left  PT pulses 2/4 Right, 2/4 Left  CFT <3sec odilon  Decreased edema to left foot and ankle  No varicosities noted odilon     MUSC/SKEL:  Muscle Strength Right foot  Dorsiflexors 5/5, Plantarflexors 5/5, Evertors 5/5, Invertors 5/5  Muscle Strength Left foot Dorsiflexors 5/5, Plantarflexors 5/5, Evertors 5/5, Invertors 5/5  S/p left ankle medial OCD debridement  Mild discomfort with end range plantarflexion and pronation, no significant crepitus with ROM     DERM:  Pedal nails 1-5 odilon are within normal limits of length and thickness  Webspaces are c/d/i  Skin is supple with no open wounds or sores appreciated.  Incision to left anterior medial ankle well healed      PATHOLOGY RESULTS:      RADIOLOGY/NUCLEAR:      LABORATORY/CULTURE RESULTS:     Lab Results (last 24 hours)     ** No results found for the last 24 hours. **          ASSESSMENT/PLAN     Patient Active Problem List   Diagnosis   • Osteochondral defect of ankle   • Chronic pain of left ankle         ICD-10-CM ICD-9-CM   1. Chronic pain of left ankle M25.572 719.47    G89.29 338.29   2. Osteochondral defect of ankle M95.8 738.8     -Pt was examined and evaluated  -Discussed findings and treatment options with patient in detail  -Reviewed any pertinent xrays, labs and studies from pt chart  -ordered f/u xrays  -pt to continue PT has needed  -continued treatment course  -RTC PRN, or sooner if acute issues arise.      Lab Frequency Next Occurrence   Follow Anesthesia Guidelines / Standing Orders Once 11/9/2016   Type and screen Once 11/9/2016   XR Ankle 3+ View Left Once 1/28/2017           Please note that portions of this note may have been completed with a voice recognition program. Efforts were made to edit the dictations, but occasionally words are mistranscribed.

## 2017-01-27 ENCOUNTER — HOSPITAL ENCOUNTER (OUTPATIENT)
Dept: PHYSICAL THERAPY | Facility: HOSPITAL | Age: 52
Setting detail: THERAPIES SERIES
Discharge: HOME | End: 2017-01-27
Attending: PODIATRIST

## 2017-01-27 DIAGNOSIS — G89.29 CHRONIC PAIN OF LEFT ANKLE: Primary | ICD-10-CM

## 2017-01-27 DIAGNOSIS — M95.8 OSTEOCHONDRAL DEFECT OF ANKLE: ICD-10-CM

## 2017-01-27 DIAGNOSIS — M25.572 CHRONIC PAIN OF LEFT ANKLE: Primary | ICD-10-CM

## 2017-01-27 PROCEDURE — 97140 MANUAL THERAPY 1/> REGIONS: CPT

## 2017-01-27 PROCEDURE — 97110 THERAPEUTIC EXERCISES: CPT

## 2017-01-27 NOTE — PROGRESS NOTES
Outpatient Physical Therapy Ortho Treatment Note  [facility]     Patient Name: Ismael Huff  : 1965  MRN: [id number]  Today's Date: 2017      Visit Date: 2017    Visit Dx:    ICD-10-CM ICD-9-CM   1. Chronic pain of left ankle M25.572 719.47    G89.29 338.29   2. Osteochondral defect of ankle M95.8 738.8       Patient Active Problem List   Diagnosis   • Osteochondral defect of ankle   • Chronic pain of left ankle        Past Medical History   Diagnosis Date   • Ankle pain, left    • Arthritis    • Gout    • History of kidney stones    • History of pneumonia    • Tendonitis of ankle      left        Past Surgical History   Procedure Laterality Date   • Colonoscopy     • Knee surgery Left      torn cartilage repair   • Colectomy partial / total       polyps ruptured   • Leg excision lesion/cyst Left 2016     Procedure: ANKLE LOOSE BODY EXCISION/REMOVAL, LEFT;  Surgeon: Ajay Comer DPM;  Location: [facility] OR;  Service:    • Ankle surgery Left                              PT Assessment/Plan       17 1537 17 1600       PT Assessment    Assessment Comments He denies pain but continues to walk with an abnormal gait pattern and exhibit decreased ankle AROM. He believes he is close to being able to return to work but he hasn't been walking very much, and today was the first time we simulated some work activities.  -EC His motion is consistently improving. He is eager to return to work. He is severely weak in his L hip due to the longstanding nature of his injury and the compensations that he had gotten used to. He will most likely not get full ROM of the ankle but he should continue to progress. Need to focus on more stability and hip strength to allow him to return to work safely. MD has told him he could go back to work as soon as PT released him.  -HR     PT Plan    PT Plan Comments Assess his response post work simulated activities but I'm cooncerned he is not  ready to return to work at this time and we will need to get further visits authorized.  -EC Continue focus on ROM with addition of more proprioception and strengthening throughout LE to allow return to work.  -HR       User Key  (r) = Recorded By, (t) = Taken By, (c) = Cosigned By    Initials Name Provider Type    EC Varun Archibald PTA Physical Therapy Assistant    HR Arline Alvarez, PT Physical Therapist                    Exercises       01/27/17 1300          Subjective Comments    Subjective Comments He denies pain   -EC      Subjective Pain    Able to rate subjective pain? yes  -EC      Pre-Treatment Pain Level 0  -EC      Post-Treatment Pain Level 0  -EC      Subjective Pain Comment He reports his  has relased him w/o follow up and that we will determine when he is ready to return to work  -EC      Exercise 1    Exercise Name 1 CW/CCW seated   -EC      Equipment 1 --   BAPS L 2  -EC      Time (Minutes) 1 2  -EC      Exercise 2    Exercise Name 2 push / pull sled  -EC      Equipment 2 --   Weighted Sled #25  -EC      Reps 2 --   95 ft  -EC      Exercise 3    Exercise Name 3 #20 box squat and carry   -EC      Reps 3 --   95  -EC      Exercise 4    Exercise Name 4 walk on tread mill   -EC      Time (Minutes) 4 5   1 mph no incline  -EC      Exercise 5    Exercise Name 5 L SLS on blue foam for max  -EC      Time (Seconds) 5 7  -EC        User Key  (r) = Recorded By, (t) = Taken By, (c) = Cosigned By    Initials Name Provider Type    EC Varun Archibald PTA Physical Therapy Assistant                        Manual Rx (last 36 hours)      Manual Treatments       01/27/17 1500          Manual Rx 1    Manual Rx 1 Location L gastroc/soleus  -EC      Manual Rx 1 Type STM   focus on fibrotic changes along lateral lower leg  -EC      Manual Rx 1 Duration 10  -EC      Manual Rx 2    Manual Rx 2 Location L talocrual   -EC      Manual Rx 2 Type A-P  -EC      Manual Rx 2 Grade 3  -EC      Manual Rx 2 Duration 5   -EC      Manual Rx 3    Manual Rx 3 Location talocrual distraction   -EC      Manual Rx 3 Duration 5  -EC      Manual Rx 4    Manual Rx 4 Location L tibia IR glides  -EC      Manual Rx 4 Duration 5  -EC        User Key  (r) = Recorded By, (t) = Taken By, (c) = Cosigned By    Initials Name Provider Type    LAKESHIA Archibald, PTA Physical Therapy Assistant                PT OP Goals       01/27/17 1351 01/27/17 1300 01/23/17 1600    PT Short Term Goals    STG Date to Achieve  01/27/17  -EC 01/27/17  -HR    STG 1  Patient will improve left ankle DF PROM to 10 degrees  -EC Patient will improve left ankle DF PROM to 10 degrees  -HR    STG 1 Progress  Ongoing  -EC Ongoing  -HR    STG 1 Progress Comments   5  -HR    STG 2  Patient will improve left ankle DF AROM to 5 degrees without compensation  -EC Patient will improve left ankle DF AROM to 5 degrees without compensation  -HR    STG 2 Progress  Ongoing  -EC Ongoing  -HR    STG 2 Progress Comments   0-1  -HR    Long Term Goals    LTG Date to Achieve  02/17/17  -EC 02/17/17  -HR    LTG 1  Patient will be independent with a comprehensive HEP by discharge.  -EC Patient will be independent with a comprehensive HEP by discharge.  -HR    LTG 1 Progress  Ongoing  -EC Ongoing  -HR    LTG 1 Progress Comments  reviewed all components today  -EC added clam shells to begin more hip strengthening  -HR    LTG 2  Patient will improve gross ankle MMT to at least 4+/5 by discharge.  -EC Patient will improve gross ankle MMT to at least 4+/5 by discharge.  -HR    LTG 2 Progress  Ongoing  -EC Ongoing  -HR    LTG 3  Patient will be able to ambulate with proper forward weight shifting, avoiding forefoot ER or tibial ER, to promote return to work by discharge.  -EC Patient will be able to ambulate with proper forward weight shifting, avoiding forefoot ER or tibial ER, to promote return to work by discharge.  -HR    LTG 3 Progress  Ongoing  -EC Ongoing  -HR    LTG 4  Patient will be able to  perform a left SLS without compensation at the pelvis for at least 10 seconds with the eyes closed by discharge.  -EC Patient will be able to perform a left SLS without compensation at the pelvis for at least 10 seconds with the eyes closed by discharge.  -HR    LTG 4 Progress  Ongoing  -EC Ongoing  -HR    LTG 4 Progress Comments  unable to perform  SLS on blue foam EO  -EC unable  -HR    LTG 5  Patient will be able to return to work and perform work specific duties with <3/10 left ankle pain by discharge.  -EC Patient will be able to return to work and perform work specific duties with <3/10 left ankle pain by discharge.  -HR    LTG 5 Progress  Ongoing  -EC Ongoing  -HR    LTG 5 Progress Comments  simulated work activities today for the first time  -EC wants to return to work ASAP  -HR    Time Calculation    PT Goal Re-Cert Due Date 02/03/17  -EC  02/03/17  -HR      01/20/17 1457 01/18/17 1600 01/16/17 1455    PT Short Term Goals    STG Date to Achieve 01/27/17  -KR 01/27/17  -EC     STG 1 Patient will improve left ankle DF PROM to 10 degrees  -KR Patient will improve left ankle DF PROM to 10 degrees  -EC     STG 1 Progress Ongoing  -KR Ongoing  -EC     STG 2 Patient will improve left ankle DF AROM to 5 degrees without compensation  -KR Patient will improve left ankle DF AROM to 5 degrees without compensation  -EC     STG 2 Progress Ongoing  -KR Ongoing  -EC     STG 2 Progress Comments 0 AROM  -KR      Long Term Goals    LTG Date to Achieve 02/17/17  -KR 02/17/17  -EC     LTG 1 Patient will be independent with a comprehensive HEP by discharge.  -KR Patient will be independent with a comprehensive HEP by discharge.  -EC     LTG 1 Progress Ongoing  -KR Ongoing  -EC     LTG 1 Progress Comments  reviewed and updated with self taleocrural mobilization  -EC     LTG 2 Patient will improve gross ankle MMT to at least 4+/5 by discharge.  -KR Patient will improve gross ankle MMT to at least 4+/5 by discharge.  -EC     LTG 2  Progress Ongoing  -KR Ongoing  -EC     LTG 3 Patient will be able to ambulate with proper forward weight shifting, avoiding forefoot ER or tibial ER, to promote return to work by discharge.  -KR Patient will be able to ambulate with proper forward weight shifting, avoiding forefoot ER or tibial ER, to promote return to work by discharge.  -EC     LTG 3 Progress Ongoing  -KR Ongoing  -EC     LTG 4 Patient will be able to perform a left SLS without compensation at the pelvis for at least 10 seconds with the eyes closed by discharge.  -KR Patient will be able to perform a left SLS without compensation at the pelvis for at least 10 seconds with the eyes closed by discharge.  -EC     LTG 4 Progress Ongoing  -KR Ongoing  -EC     LTG 5 Patient will be able to return to work and perform work specific duties with <3/10 left ankle pain by discharge.  -KR Patient will be able to return to work and perform work specific duties with <3/10 left ankle pain by discharge.  -EC     LTG 5 Progress Ongoing  -KR Ongoing  -EC     Time Calculation    PT Goal Re-Cert Due Date 02/05/17  -KR  02/05/17  -EC      01/16/17 1400          PT Short Term Goals    STG Date to Achieve 01/27/17  -EC      STG 1 Patient will improve left ankle DF PROM to 10 degrees  -EC      STG 1 Progress Ongoing  -EC      STG 2 Patient will improve left ankle DF AROM to 5 degrees without compensation  -EC      STG 2 Progress Ongoing  -EC      Long Term Goals    LTG Date to Achieve 02/17/17  -EC      LTG 1 Patient will be independent with a comprehensive HEP by discharge.  -EC      LTG 1 Progress Ongoing  -EC      LTG 2 Patient will improve gross ankle MMT to at least 4+/5 by discharge.  -EC      LTG 2 Progress Ongoing  -EC      LTG 3 Patient will be able to ambulate with proper forward weight shifting, avoiding forefoot ER or tibial ER, to promote return to work by discharge.  -EC      LTG 3 Progress Ongoing  -EC      LTG 3 Progress Comments flat foot with absent HS,  reduced WS and stance phase on L LE  -EC      LTG 4 Patient will be able to perform a left SLS without compensation at the pelvis for at least 10 seconds with the eyes closed by discharge.  -EC      LTG 4 Progress Ongoing  -EC      LTG 5 Patient will be able to return to work and perform work specific duties with <3/10 left ankle pain by discharge.  -EC      LTG 5 Progress Ongoing  -EC      LTG 5 Progress Comments reports walks an average of four miles daily at work and includes lifting and carrying things   -EC        User Key  (r) = Recorded By, (t) = Taken By, (c) = Cosigned By    Initials Name Provider Type    EC Varun Archibald PTA Physical Therapy Assistant    ESVIN Barnett, PT Physical Therapist    HR Arline Alvarez, PT Physical Therapist                Therapy Education       01/27/17 8567    Therapy Education    Given Symptoms/condition management  -EC    How Provided Verbal  -EC    Provided to Patient  -EC    Level of Understanding Verbalized  -EC      User Key  (r) = Recorded By, (t) = Taken By, (c) = Cosigned By    Initials Name Provider Type    EC Varun Archibald PTA Physical Therapy Assistant                Time Calculation:   Start Time: 0150  Stop Time: 0230  Time Calculation (min): 40 min  Total Timed Code Minutes- PT: 40 minute(s)    Therapy Charges for Today     Code Description Service Date Service Provider Modifiers Qty    05273658930 HC PT MANUAL THERAPY EA 15 MIN 1/27/2017 Varun Archibald PTA GP 2    34287690732 HC PT THER PROC EA 15 MIN 1/27/2017 Varun Archibald PTA GP 1                    Varun Archibald PTA  1/27/2017

## 2017-01-31 ENCOUNTER — HOSPITAL ENCOUNTER (OUTPATIENT)
Dept: PHYSICAL THERAPY | Facility: HOSPITAL | Age: 52
Setting detail: THERAPIES SERIES
Discharge: HOME | End: 2017-01-31
Attending: PODIATRIST

## 2017-01-31 ENCOUNTER — TELEPHONE (OUTPATIENT)
Dept: VASCULAR SURGERY | Facility: CLINIC | Age: 52
End: 2017-01-31

## 2017-01-31 DIAGNOSIS — M95.8 OSTEOCHONDRAL DEFECT OF ANKLE: ICD-10-CM

## 2017-01-31 DIAGNOSIS — M25.572 CHRONIC PAIN OF LEFT ANKLE: Primary | ICD-10-CM

## 2017-01-31 DIAGNOSIS — G89.29 CHRONIC PAIN OF LEFT ANKLE: Primary | ICD-10-CM

## 2017-01-31 PROCEDURE — 97110 THERAPEUTIC EXERCISES: CPT

## 2018-03-27 ENCOUNTER — OFFICE VISIT (OUTPATIENT)
Dept: PODIATRY | Facility: CLINIC | Age: 53
End: 2018-03-27

## 2018-03-27 ENCOUNTER — HOSPITAL ENCOUNTER (OUTPATIENT)
Dept: GENERAL RADIOLOGY | Facility: HOSPITAL | Age: 53
Discharge: HOME | End: 2018-03-27
Attending: PODIATRIST | Admitting: PODIATRIST

## 2018-03-27 VITALS
DIASTOLIC BLOOD PRESSURE: 82 MMHG | SYSTOLIC BLOOD PRESSURE: 118 MMHG | HEIGHT: 72 IN | WEIGHT: 202 LBS | HEART RATE: 69 BPM | OXYGEN SATURATION: 96 % | BODY MASS INDEX: 27.36 KG/M2

## 2018-03-27 DIAGNOSIS — M25.572 CHRONIC PAIN OF LEFT ANKLE: Primary | ICD-10-CM

## 2018-03-27 DIAGNOSIS — M25.373 ANKLE INSTABILITY, UNSPECIFIED LATERALITY: ICD-10-CM

## 2018-03-27 DIAGNOSIS — G89.29 CHRONIC PAIN OF LEFT ANKLE: Primary | ICD-10-CM

## 2018-03-27 DIAGNOSIS — G89.29 CHRONIC PAIN OF LEFT ANKLE: ICD-10-CM

## 2018-03-27 DIAGNOSIS — M25.572 CHRONIC PAIN OF LEFT ANKLE: ICD-10-CM

## 2018-03-27 PROCEDURE — 73610 X-RAY EXAM OF ANKLE: CPT

## 2018-03-27 PROCEDURE — 99213 OFFICE O/P EST LOW 20 MIN: CPT | Performed by: PODIATRIST

## 2018-03-27 RX ORDER — GABAPENTIN 300 MG/1
300 CAPSULE ORAL 3 TIMES DAILY
COMMUNITY
End: 2018-09-14

## 2018-03-27 NOTE — PROGRESS NOTES
[facility] - PODIATRY    Today's Date: 03/27/18    Patient Name: Ismael Huff  MRN: [id number]  CSN: [id number]  PCP: Edouard Madrigal DO  Referring Provider: No ref. provider found    SUBJECTIVE     Chief Complaint   Patient presents with   • Left Ankle - Pain     PT c/o sharp pain starting from heel and ankle shooting up leg. Ankle loose body excision/removal 11/22/2016. Pain scale: 5/10. Non-diabetic. PCP: Dr. Edouard Madrigal     HPI: Ismael Huff, a 52 y.o.male, comes to clinic as a(n) established patient complaining of left ankle pain. Patient has h/o arthritis, gout, kidney stones. Patient had OCD repair/debridement in November 2016.  Patient states that procedure with subsequent physical therapy was successful and had minimal to no complications with procedure.  Admits to being unable to afford [place] brace.  States that 3 weeks ago.  His left ankle started to become significantly more painful.  Denies single traumatic event.  He notes that his ankle will give out on him and twist.  Has increased pain with walking, especially on uneven surfaces. Admits pain at 5/10 level and described as aching, throbbing and sharp. Denies previous treatment. Denies any constitutional symptoms. No other pedal complaints at this time.    Past Medical History:   Diagnosis Date   • Ankle pain, left    • Arthritis    • Gout    • History of kidney stones    • History of pneumonia    • Tendonitis of ankle     left   • Wears dentures      Past Surgical History:   Procedure Laterality Date   • ANKLE SURGERY Left    • COLECTOMY PARTIAL / TOTAL  2011    polyps ruptured   • COLONOSCOPY     • KNEE SURGERY Left 1990s    torn cartilage repair   • LEG EXCISION LESION/CYST Left 11/22/2016    Procedure: ANKLE LOOSE BODY EXCISION/REMOVAL, LEFT;  Surgeon: Ajay Comer DPM;  Location: [facility] OR;  Service:      Family History   Problem Relation Age of Onset   • Cancer Mother    • Diabetes Mother    • Heart disease Father     • Cancer Father    • Hypertension Father    • Arthritis Father    • Stroke Father    • Hyperlipidemia Father      Social History     Social History   • Marital status:      Spouse name: N/A   • Number of children: N/A   • Years of education: N/A     Occupational History   • Not on file.     Social History Main Topics   • Smoking status: Never Smoker   • Smokeless tobacco: Current User     Types: Snuff      Comment: ~40 YEARS   • Alcohol use No   • Drug use: No   • Sexual activity: Defer     Other Topics Concern   • Not on file     Social History Narrative   • No narrative on file     Allergies   Allergen Reactions   • Reglan [Metoclopramide] Anaphylaxis   • Allopurinol Rash   • Keflex [Cephalexin] Rash     Current Outpatient Prescriptions   Medication Sig Dispense Refill   • gabapentin (NEURONTIN) 300 MG capsule Take 300 mg by mouth 3 (Three) Times a Day.     • docusate sodium (COLACE) 100 MG capsule Take 1 capsule by mouth Daily. (Patient taking differently: Take 100 mg by mouth 2 (Two) Times a Day As Needed.) 7 capsule 0   • Misc. Devices (CRUTCH SET) misc 1 Device Daily. 1 each 0   • oxyCODONE-acetaminophen (PERCOCET) 5-325 MG per tablet Take 1-2 tablets by mouth Every 4 (Four) Hours As Needed (Pain). 30 tablet 0   • promethazine (PHENERGAN) 12.5 MG tablet Take 1 tablet by mouth Every 8 (Eight) Hours As Needed for nausea or vomiting. 21 tablet 0     No current facility-administered medications for this visit.      Review of Systems   Constitutional: Negative for chills and fever.   HENT: Negative for congestion.    Respiratory: Negative for shortness of breath.    Cardiovascular: Negative for chest pain and leg swelling.   Gastrointestinal: Negative for constipation, diarrhea, nausea and vomiting.   Musculoskeletal:        Ankle pain   Skin: Negative for wound.   Neurological: Negative for numbness.       OBJECTIVE     Vitals:    03/27/18 1504   BP: 118/82   Pulse: 69   SpO2: 96%       PHYSICAL  EXAM  GEN:   A&Ox3, NAD. Pt presents to clinic ambulating without assistance and wearing Casual Shoes. Accompanied by yanelise.     NEURO:   Protective sensation intact to 10/10 sites Right foot, 10/10 site Left foot using [place] monofilament  Light touch sensation present  No Tinel's or Villeux sign.    VASC:  Skin temperature Warm to Warm proximal to distal odilon  DP pulses 2/4 Right, 2/4 Left  PT pulses 2/4 Right, 2/4 Left  CFT 3 sec odilon  Pedal hair growth present  no edema noted odilon  Varicosities absent odilon    MUSC/SKEL:  Muscle Strength Right foot Dorsiflexors 5/5, Plantarflexors 5/5, Evertors 5/5, Invertors 5/5  Muscle Strength Left foot Dorsiflexors 5/5, Plantarflexors 5/5, Evertors 5/5, Invertors 5/5  ROM of the 1st MTP is full without pain or crepitus  ROM of the MTJ is full without pain or crepitus    ROM of the STJ is full without pain or crepitus    ROM of the ankle joint is decreased on left, mostly due to guarding  POP of left lateral ankle along ATFL and CFL.  Unable to test peroneal tendons due to guarding.  No significant pain along the course of peroneal tendons.  Rectus foot type   Gait pattern: Normal  No gross pedal musculoskeletal deformities noted.     DERM:  Pedal nails x10 are within normal limits of length and thickness  Webspaces are Clean, Dry, and Intact  Skin is normal in turgor and texture with no open wounds or sores appreciated.  Previous incision site at anterior medial aspect of left ankle is fully coapted with no signs of hypertrophied scar      RADIOLOGY/NUCLEAR:  No results found.    LABORATORY/CULTURE RESULTS:      PATHOLOGY RESULTS:       ASSESSMENT/PLAN     Ismael was seen today for pain.    Diagnoses and all orders for this visit:    Chronic pain of left ankle  -     XR Ankle 3+ View Left; Future  -     XR Foot 3+ View Left; Future    Ankle instability, unspecified laterality      Comprehensive lower extremity examination and evaluation was performed.  Discussed  findings and treatment plan including risks, benefits, and treatment options with patient in detail. Patient agreed with treatment plan.  Treatment will remain conservative with immobilization and cam boot, compressigrip, NSAIDs and icing.  Upon next appointment, if limited improvement is noted, we will consider MRI versus physical therapy.   Patient has cam boot at home  Discussed possible surgical intervention for lateral ankle instability if conservative treatment fails.  An After Visit Summary was printed and given to the patient at discharge, including (if requested) any available informative/educational handouts regarding diagnosis, treatment, or medications. All questions were answered to patient/family satisfaction. Should symptoms fail to improve or worsen they agree to call or return to clinic or to go to the Emergency Department. Discussed the importance of following up with any needed screening tests/labs/specialist appointments and any requested follow-up recommended by me today. Importance of maintaining follow-up discussed and patient accepts that missed appointments can delay diagnosis and potentially lead to worsening of conditions.  Return in about 3 weeks (around 4/17/2018)., or sooner if acute issues arise.    Lab Frequency Next Occurrence   Follow Anesthesia Guidelines / Standing Orders Once 11/09/2016   XR Ankle 3+ View Left Once 04/01/2018   XR Foot 3+ View Left Once 04/01/2018       This document has been electronically signed by Ajay Comer DPM on March 27, 2018 3:37 PM

## 2018-03-29 ENCOUNTER — TELEPHONE (OUTPATIENT)
Dept: PODIATRY | Facility: CLINIC | Age: 53
End: 2018-03-29

## 2018-03-29 NOTE — TELEPHONE ENCOUNTER
Called and spoke with  Ismael Daria in regards to his testing. He verbalized understanding at this time. Reminded him of his appt next month 04/18/2018 and to call the office if he begins having any more pain or has any questions at a later date.

## 2018-03-29 NOTE — TELEPHONE ENCOUNTER
Called Tae Daria to review testing results from 03/27/2018 - no answer and voicemail is not set up. Will try again at a later time today.

## 2018-04-10 ENCOUNTER — APPOINTMENT (OUTPATIENT)
Dept: GENERAL RADIOLOGY | Facility: HOSPITAL | Age: 53
End: 2018-04-10

## 2018-04-10 ENCOUNTER — HOSPITAL ENCOUNTER (EMERGENCY)
Facility: HOSPITAL | Age: 53
Discharge: HOME | End: 2018-04-10
Admitting: FAMILY MEDICINE

## 2018-04-10 VITALS
HEIGHT: 72 IN | DIASTOLIC BLOOD PRESSURE: 78 MMHG | RESPIRATION RATE: 18 BRPM | BODY MASS INDEX: 29.12 KG/M2 | TEMPERATURE: 98.2 F | WEIGHT: 215 LBS | SYSTOLIC BLOOD PRESSURE: 112 MMHG | HEART RATE: 82 BPM | OXYGEN SATURATION: 97 %

## 2018-04-10 DIAGNOSIS — S93.401A SPRAIN OF RIGHT ANKLE, UNSPECIFIED LIGAMENT, INITIAL ENCOUNTER: Primary | ICD-10-CM

## 2018-04-10 PROCEDURE — 73630 X-RAY EXAM OF FOOT: CPT

## 2018-04-10 PROCEDURE — 99283 EMERGENCY DEPT VISIT LOW MDM: CPT

## 2018-04-10 PROCEDURE — 96372 THER/PROPH/DIAG INJ SC/IM: CPT

## 2018-04-10 PROCEDURE — 25010000002 KETOROLAC TROMETHAMINE PER 15 MG: Performed by: PHYSICIAN ASSISTANT

## 2018-04-10 PROCEDURE — 73610 X-RAY EXAM OF ANKLE: CPT

## 2018-04-10 RX ORDER — KETOROLAC TROMETHAMINE 30 MG/ML
30 INJECTION, SOLUTION INTRAMUSCULAR; INTRAVENOUS ONCE
Status: COMPLETED | OUTPATIENT
Start: 2018-04-10 | End: 2018-04-10

## 2018-04-10 RX ORDER — ETODOLAC 200 MG/1
200 CAPSULE ORAL EVERY 8 HOURS
Qty: 15 CAPSULE | Refills: 0 | Status: SHIPPED | OUTPATIENT
Start: 2018-04-10 | End: 2018-04-18 | Stop reason: SDUPTHER

## 2018-04-10 RX ADMIN — KETOROLAC TROMETHAMINE 30 MG: 30 INJECTION, SOLUTION INTRAMUSCULAR at 19:16

## 2018-04-11 NOTE — ED PROVIDER NOTES
Subjective   53-year-old male presents a chief complaint of right ankle pain.  Patient reports on Sunday he had symptoms all and had deferred at his right ankle.  Since then he has had medial tenderness his ankle.  He is unable to demonstrate aversion.  The patient still maintains range of motion with plantar flexion dorsiflexion inversion, pain reproduced with bearing weight and palpation             Review of Systems   All other systems reviewed and are negative.      Past Medical History:   Diagnosis Date   • Ankle pain, left    • Arthritis    • Gout    • History of kidney stones    • History of pneumonia    • Tendonitis of ankle     left   • Wears dentures        Allergies   Allergen Reactions   • Reglan [Metoclopramide] Anaphylaxis   • Allopurinol Rash   • Keflex [Cephalexin] Rash       Past Surgical History:   Procedure Laterality Date   • ANKLE SURGERY Left    • COLECTOMY PARTIAL / TOTAL  2011    polyps ruptured   • COLONOSCOPY     • KNEE SURGERY Left 1990s    torn cartilage repair   • LEG EXCISION LESION/CYST Left 11/22/2016    Procedure: ANKLE LOOSE BODY EXCISION/REMOVAL, LEFT;  Surgeon: Ajay Comer DPM;  Location: [facility] OR;  Service:        Family History   Problem Relation Age of Onset   • Cancer Mother    • Diabetes Mother    • Heart disease Father    • Cancer Father    • Hypertension Father    • Arthritis Father    • Stroke Father    • Hyperlipidemia Father        Social History     Social History   • Marital status:      Social History Main Topics   • Smoking status: Never Smoker   • Smokeless tobacco: Current User     Types: Snuff      Comment: ~40 YEARS   • Alcohol use No   • Drug use: No   • Sexual activity: Defer     Other Topics Concern   • Not on file           Objective   Physical Exam   Constitutional: He is oriented to person, place, and time. He appears well-developed and well-nourished.   HENT:   Head: Normocephalic and atraumatic.   Eyes: EOM are normal. Pupils are equal,  round, and reactive to light.   Neck: Normal range of motion. Neck supple.   Cardiovascular: Normal rate and regular rhythm.    Pulmonary/Chest: Effort normal and breath sounds normal.   Abdominal: Soft. Bowel sounds are normal.   Musculoskeletal: Normal range of motion.   Right ankle swelling and tenderness to palpation medial and lateral aspect of the ankle    Patient is able to demonstrate plantar flexion, dorsiflexion, inversion but not eversion due to pain    Brisk pedal pulse   Lymphadenopathy:     He has no cervical adenopathy.   Neurological: He is alert and oriented to person, place, and time.   Skin: Skin is warm and dry.   Psychiatric: He has a normal mood and affect. His behavior is normal.   Nursing note and vitals reviewed.      Procedures         ED Course  ED Course                  MDM  Number of Diagnoses or Management Options  Diagnosis management comments: Smokeless radiologist, plain film is negative.  Patient has been placed in an Aircast.  I have offered the patient crutches and he has declined as he has crutches at home.  We will discharge this patient with etodolac and offer strong return precautions.  He will follow up with primary care in 1 week       Amount and/or Complexity of Data Reviewed  Tests in the radiology section of CPT®: ordered and reviewed    Risk of Complications, Morbidity, and/or Mortality  Presenting problems: moderate  Diagnostic procedures: moderate  Management options: moderate    Patient Progress  Patient progress: stable      Final diagnoses:   Sprain of right ankle, unspecified ligament, initial encounter            Praveen Villeda PA-C  04/10/18 [id number]

## 2018-04-11 NOTE — ED NOTES
C/o's 'stepped in a hole Sunday & done something to my ankle.'     Lynne Murcia RN  04/10/18 0811

## 2018-04-16 NOTE — ED NOTES
"ED Call Back Questions    1. How are you doing since leaving the Emergency Department?    Not much better.  Has follow up scheduled for Wednesday.  Good ER visit.  2. Do you have any questions about your discharge instructions? No     3. Have you filled your new prescriptions yet? Yes   a. Do you have any questions about those medications? No     4. Were you able to make a follow-up appointment with the physician? Yes     5. Do you have a primary care physician? Yes   a. If No, would you like for me to set you up with one? N/A  i. If Yes, “I will have our ED  give you a call right back at this number to work with you on the best time for an appointment.”    6. We are always looking to get better at what we do. Do you have any suggestions for what we can do to be even better? No   a. If Yes, \"Thank you for sharing your concerns. I apologize. I will follow up with our manager and patient . Would you like someone to call you back?\" N/A    7. Is there anything else I can do for you? No     "

## 2018-04-17 ENCOUNTER — TELEPHONE (OUTPATIENT)
Dept: PODIATRY | Facility: CLINIC | Age: 53
End: 2018-04-17

## 2018-04-17 NOTE — TELEPHONE ENCOUNTER
Called and reminded Mr. Ismael Huff of his appointment with Dr. Davis Comer tomorrow at 2:15 pm. PT confirmed he will be here.

## 2018-04-18 ENCOUNTER — OFFICE VISIT (OUTPATIENT)
Dept: PODIATRY | Facility: CLINIC | Age: 53
End: 2018-04-18

## 2018-04-18 VITALS
SYSTOLIC BLOOD PRESSURE: 142 MMHG | HEART RATE: 86 BPM | WEIGHT: 219 LBS | DIASTOLIC BLOOD PRESSURE: 80 MMHG | HEIGHT: 72 IN | OXYGEN SATURATION: 96 % | BODY MASS INDEX: 29.66 KG/M2

## 2018-04-18 DIAGNOSIS — G89.29 CHRONIC PAIN OF LEFT ANKLE: Primary | ICD-10-CM

## 2018-04-18 DIAGNOSIS — M25.372 ANKLE INSTABILITY, LEFT: ICD-10-CM

## 2018-04-18 DIAGNOSIS — M25.572 CHRONIC PAIN OF LEFT ANKLE: Primary | ICD-10-CM

## 2018-04-18 PROCEDURE — 99213 OFFICE O/P EST LOW 20 MIN: CPT | Performed by: PODIATRIST

## 2018-04-18 RX ORDER — ETODOLAC 200 MG/1
200 CAPSULE ORAL EVERY 8 HOURS
Qty: 63 CAPSULE | Refills: 0 | Status: SHIPPED | OUTPATIENT
Start: 2018-04-18 | End: 2018-05-09

## 2018-04-18 NOTE — PROGRESS NOTES
"    [facility] - PODIATRY    Today's Date: 04/18/18    Patient Name: Ismael Huff  MRN: [id number]  CSN: [id number]  PCP: Edouard Madrigal DO  Referring Provider: No ref. provider found    SUBJECTIVE     Chief Complaint   Patient presents with   • Left Ankle - Follow-up     PT c/o occasional pain shooting up ankle, occasionally ankle will \"give out\" while walking. Pain scale: 10/10 when pain occurs. PCP: Dr. Edouard Madrigal      HPI: Ismael Huff, a 53 y.o.male, comes to clinic as a(n) established patient complaining of left ankle pain. Patient has h/o arthritis, gout, kidney stones. Patient had OCD repair/debridement in November 2016.  Patient states that procedure with subsequent physical therapy was successful and had minimal to no complications with procedure.  Admits to being unable to afford [place] brace.  States that a few weeks ago, his left ankle started to become significantly more painful.  Denies single traumatic event.  He notes that his ankle will give out on him and twist.  Has increased pain with walking, especially on uneven surfaces. He admits to not wearing his CAM boot. Relates that a week ago he twisted his right ankle with a similar injury. He when to the ER where he was given Lodine and air cast. Notes mild continued pain to site. Admits pain at 10/10 level and described as shooting, aching and throbbing. Denies previous treatment. Denies any constitutional symptoms. No other pedal complaints at this time.    Past Medical History:   Diagnosis Date   • Ankle pain, left    • Arthritis    • Gout    • History of kidney stones    • History of pneumonia    • Tendonitis of ankle     left   • Wears dentures      Past Surgical History:   Procedure Laterality Date   • ANKLE SURGERY Left    • COLECTOMY PARTIAL / TOTAL  2011    polyps ruptured   • COLONOSCOPY     • KNEE SURGERY Left 1990s    torn cartilage repair   • LEG EXCISION LESION/CYST Left 11/22/2016    Procedure: ANKLE LOOSE BODY " EXCISION/REMOVAL, LEFT;  Surgeon: Ajay Comer DPM;  Location: [facility] OR;  Service:      Family History   Problem Relation Age of Onset   • Cancer Mother    • Diabetes Mother    • Heart disease Father    • Cancer Father    • Hypertension Father    • Arthritis Father    • Stroke Father    • Hyperlipidemia Father      Social History     Social History   • Marital status:      Spouse name: N/A   • Number of children: N/A   • Years of education: N/A     Occupational History   • Not on file.     Social History Main Topics   • Smoking status: Never Smoker   • Smokeless tobacco: Current User     Types: Snuff      Comment: ~40 YEARS   • Alcohol use No   • Drug use: No   • Sexual activity: Defer     Other Topics Concern   • Not on file     Social History Narrative   • No narrative on file     Allergies   Allergen Reactions   • Reglan [Metoclopramide] Anaphylaxis   • Allopurinol Rash   • Keflex [Cephalexin] Rash     Current Outpatient Prescriptions   Medication Sig Dispense Refill   • gabapentin (NEURONTIN) 300 MG capsule Take 300 mg by mouth 3 (Three) Times a Day.     • docusate sodium (COLACE) 100 MG capsule Take 1 capsule by mouth Daily. (Patient taking differently: Take 100 mg by mouth 2 (Two) Times a Day As Needed.) 7 capsule 0   • etodolac (LODINE) 200 MG capsule Take 1 capsule by mouth Every 8 (Eight) Hours for 21 days. 63 capsule 0   • Misc. Devices (CRUTCH SET) misc 1 Device Daily. 1 each 0   • oxyCODONE-acetaminophen (PERCOCET) 5-325 MG per tablet Take 1-2 tablets by mouth Every 4 (Four) Hours As Needed (Pain). 30 tablet 0   • promethazine (PHENERGAN) 12.5 MG tablet Take 1 tablet by mouth Every 8 (Eight) Hours As Needed for nausea or vomiting. 21 tablet 0     No current facility-administered medications for this visit.      Review of Systems   Constitutional: Negative for chills and fever.   HENT: Negative for congestion.    Respiratory: Negative for shortness of breath.    Cardiovascular: Negative for  chest pain and leg swelling.   Gastrointestinal: Negative for constipation, diarrhea, nausea and vomiting.   Musculoskeletal:        Ankle pain   Skin: Negative for wound.   Neurological: Negative for numbness.       OBJECTIVE     Vitals:    04/18/18 1307   BP: 142/80   Pulse: 86   SpO2: 96%       PHYSICAL EXAM  GEN:   A&Ox3, NAD. Pt presents to clinic ambulating without assistance and wearing Casual Shoes. Accompanied by fiancee.     NEURO:   Protective sensation intact to 10/10 sites Right foot, 10/10 site Left foot using [place] monofilament  Light touch sensation present  No Tinel's or Villeux sign.    VASC:  Skin temperature Warm to Warm proximal to distal odilon  DP pulses 2/4 Right, 2/4 Left  PT pulses 2/4 Right, 2/4 Left  CFT 3 sec odilon  Pedal hair growth present  no edema noted odilon  Varicosities absent odilon    MUSC/SKEL:  Muscle Strength Right foot Dorsiflexors 5/5, Plantarflexors 5/5, Evertors 5/5, Invertors 5/5  Muscle Strength Left foot Dorsiflexors 5/5, Plantarflexors 5/5, Evertors 5/5, Invertors 5/5  ROM of the 1st MTP is full without pain or crepitus  ROM of the MTJ is full without pain or crepitus    ROM of the STJ is full without pain or crepitus    ROM of the ankle joint is decreased on left, mostly due to guarding  POP of left lateral ankle along ATFL and to lesser degree CFL.  No significant pain along peroneals.   POP of right lateral ankle along ATFL  Rectus foot type   Gait pattern: Normal  No gross pedal musculoskeletal deformities noted.     DERM:  Pedal nails x10 are within normal limits of length and thickness  Webspaces are Clean, Dry, and Intact  Skin is normal in turgor and texture with no open wounds or sores appreciated.  Previous incision site at anterior medial aspect of left ankle is fully coapted with no signs of hypertrophied scar      RADIOLOGY/NUCLEAR:  Xr Ankle 3+ View Left    Result Date: 3/27/2018  Narrative: EXAMINATION: XR ANKLE 3+ VW LEFT-  3/27/2018 4:10 PM CDT   HISTORY: ankle pain; M25.572-Pain in left ankle and joints of left foot; G89.29-Other chronic pain.  Left ankle, 3 views.  Intact distal tibia and fibula.  Calcaneal spurring.  Normal ankle joint.  No discrete talus fracture is seen.  Summary: 1. Ankle joint spurring and calcaneal spurring. 2. No acute bony abnormality is seen. This report was finalized on 03/27/2018 16:27 by Dr. Julio Cantor MD.    Xr Ankle 3+ View Right    Result Date: 4/10/2018  Narrative: EXAMINATION: Right ankle 3 views 4/10/2018  HISTORY: Medial ankle pain. Inversion injury  FINDINGS: Three-view exam of the right ankle does demonstrate some lucency associated with the medial talar dome. This could represent a osteochondral defect-likely chronic in nature. The ankle mortise is otherwise well-preserved. There is mild soft tissue swelling about the ankle. There is calcaneal spurring present.      Impression: 1.. Lucency associated with the medial talar dome perhaps related to old osteochondral injury or osteochondritis. 2. Mild soft tissue swelling about the ankle. No evidence of acute fracture or dislocation. This report was finalized on 04/10/2018 22:52 by Dr. Constantino Pulido MD.    Xr Foot 3+ View Right    Result Date: 4/10/2018  Narrative: EXAMINATION: Right foot 3 views 4/10/2018  HISTORY: Medial foot pain. Inversion injury  FINDINGS: Three-view exam of the right foot demonstrates a plantar spur of the calcaneus as well as spurring at the Achilles insertion. There is no evidence of acute fracture or dislocation. No localized soft tissue swelling is present.      Impression: 1.. No evidence of acute fracture. 2. Calcaneal spurring. This report was finalized on 04/10/2018 22:51 by Dr. Constantino Pulido MD.      LABORATORY/CULTURE RESULTS:      PATHOLOGY RESULTS:       ASSESSMENT/PLAN     Ismael was seen today for follow-up.    Diagnoses and all orders for this visit:    Chronic pain of left ankle  -     MRI Ankle Left Without Contrast;  Future    Ankle instability, left  -     MRI Ankle Left Without Contrast; Future    Other orders  -     etodolac (LODINE) 200 MG capsule; Take 1 capsule by mouth Every 8 (Eight) Hours for 21 days.      Comprehensive lower extremity examination and evaluation was performed.  Discussed findings and treatment plan including risks, benefits, and treatment options with patient in detail. Patient agreed with treatment plan.  Continue conservative therapy with NSAIDs, icing, compression, ankle brace for stability.    Patient is interested in surgical intervention to correct problem. Will order MRI to confirm soft tissue complications. Will need Right ankle to be asymptomatic prior to intervention on left.   An After Visit Summary was printed and given to the patient at discharge, including (if requested) any available informative/educational handouts regarding diagnosis, treatment, or medications. All questions were answered to patient/family satisfaction. Should symptoms fail to improve or worsen they agree to call or return to clinic or to go to the Emergency Department. Discussed the importance of following up with any needed screening tests/labs/specialist appointments and any requested follow-up recommended by me today. Importance of maintaining follow-up discussed and patient accepts that missed appointments can delay diagnosis and potentially lead to worsening of conditions.  Return in about 1 month (around 5/18/2018)., or sooner if acute issues arise.    Lab Frequency Next Occurrence   Follow Anesthesia Guidelines / Standing Orders Once 11/09/2016   XR Ankle 3+ View Left Once 04/01/2018   XR Foot 3+ View Left Once 04/01/2018       This document has been electronically signed by Ajay Comer DPM on April 18, 2018 1:27 PM

## 2018-04-26 ENCOUNTER — HOSPITAL ENCOUNTER (OUTPATIENT)
Dept: MRI IMAGING | Facility: HOSPITAL | Age: 53
Discharge: HOME | End: 2018-04-26
Attending: PODIATRIST | Admitting: PODIATRIST

## 2018-04-26 DIAGNOSIS — M25.372 ANKLE INSTABILITY, LEFT: ICD-10-CM

## 2018-04-26 DIAGNOSIS — G89.29 CHRONIC PAIN OF LEFT ANKLE: ICD-10-CM

## 2018-04-26 DIAGNOSIS — M25.572 CHRONIC PAIN OF LEFT ANKLE: ICD-10-CM

## 2018-04-26 PROCEDURE — 73721 MRI JNT OF LWR EXTRE W/O DYE: CPT

## 2018-05-03 ENCOUNTER — TELEPHONE (OUTPATIENT)
Dept: PODIATRY | Facility: CLINIC | Age: 53
End: 2018-05-03

## 2018-05-03 NOTE — TELEPHONE ENCOUNTER
Called to discuss MRI results with Mr. Ismael Huff. PT does not have voicemail at this time - unable to leave callback number.

## 2018-05-16 ENCOUNTER — OFFICE VISIT (OUTPATIENT)
Dept: PODIATRY | Facility: CLINIC | Age: 53
End: 2018-05-16

## 2018-05-16 VITALS
SYSTOLIC BLOOD PRESSURE: 128 MMHG | DIASTOLIC BLOOD PRESSURE: 74 MMHG | HEIGHT: 72 IN | BODY MASS INDEX: 30.34 KG/M2 | WEIGHT: 224 LBS | HEART RATE: 69 BPM | OXYGEN SATURATION: 96 %

## 2018-05-16 DIAGNOSIS — S86.312D PERONEAL TENDON TEAR, LEFT, SUBSEQUENT ENCOUNTER: ICD-10-CM

## 2018-05-16 DIAGNOSIS — M25.572 CHRONIC PAIN OF LEFT ANKLE: ICD-10-CM

## 2018-05-16 DIAGNOSIS — M25.372 ANKLE INSTABILITY, LEFT: Primary | ICD-10-CM

## 2018-05-16 DIAGNOSIS — G89.29 CHRONIC PAIN OF LEFT ANKLE: ICD-10-CM

## 2018-05-16 PROCEDURE — 99214 OFFICE O/P EST MOD 30 MIN: CPT | Performed by: PODIATRIST

## 2018-05-16 RX ORDER — ETODOLAC 200 MG/1
200 CAPSULE ORAL EVERY 8 HOURS
COMMUNITY
End: 2018-09-14

## 2018-05-16 NOTE — H&P
[facility] - PODIATRY    Today's Date: 05/16/18    Patient Name: Ismael Huff  MRN: [id number]  CSN: [id number]  PCP: Edouard Madrigal DO  Referring Provider: No ref. provider found    SUBJECTIVE     Chief Complaint   Patient presents with   • Left Ankle - Follow-up     PT c/o soreness in left ankle, foot giving away without wearing brace, and pain. Denies swelling at present time. Pain scale: 7/10. PCP: Dr. Edouard Madrigal      HPI: Ismael Huff, a 53 y.o.male, comes to clinic as a(n) established patient complaining of left ankle pain. Patient has h/o arthritis, gout, kidney stones. Patient had OCD repair/debridement in November 2016.  Patient states that procedure with subsequent physical therapy was successful and had minimal to no complications with procedure.  Admits to being unable to afford [place] brace.  States that a few weeks ago, his left ankle started to become significantly more painful.  Denies single traumatic event.  He notes that his ankle will give out on him and twist.  Has increased pain with walking, especially on uneven surfaces. He admits to not wearing his CAM boot. Has had MRI since last appt. Admits pain at 7/10 level and described as shooting, aching and throbbing. Right ankle has improved to be minimally painful. Denies any constitutional symptoms. No other pedal complaints at this time.    Past Medical History:   Diagnosis Date   • Ankle pain, left    • Arthritis    • Gout    • History of kidney stones    • History of pneumonia    • Tendonitis of ankle     left   • Wears dentures      Past Surgical History:   Procedure Laterality Date   • ANKLE SURGERY Left    • COLECTOMY PARTIAL / TOTAL  2011    polyps ruptured   • COLONOSCOPY     • KNEE SURGERY Left 1990s    torn cartilage repair   • LEG EXCISION LESION/CYST Left 11/22/2016    Procedure: ANKLE LOOSE BODY EXCISION/REMOVAL, LEFT;  Surgeon: Ajay Comer DPM;  Location: [facility] OR;  Service:      Family History    Problem Relation Age of Onset   • Cancer Mother    • Diabetes Mother    • Heart disease Father    • Cancer Father    • Hypertension Father    • Arthritis Father    • Stroke Father    • Hyperlipidemia Father      Social History     Social History   • Marital status:      Spouse name: N/A   • Number of children: N/A   • Years of education: N/A     Occupational History   • Not on file.     Social History Main Topics   • Smoking status: Never Smoker   • Smokeless tobacco: Current User     Types: Snuff      Comment: ~40 YEARS   • Alcohol use No   • Drug use: No   • Sexual activity: Defer     Other Topics Concern   • Not on file     Social History Narrative   • No narrative on file     Allergies   Allergen Reactions   • Reglan [Metoclopramide] Anaphylaxis   • Allopurinol Rash   • Keflex [Cephalexin] Rash     Current Outpatient Prescriptions   Medication Sig Dispense Refill   • etodolac (LODINE) 200 MG capsule Take 200 mg by mouth Every 8 (Eight) Hours.     • gabapentin (NEURONTIN) 300 MG capsule Take 300 mg by mouth 3 (Three) Times a Day.     • docusate sodium (COLACE) 100 MG capsule Take 1 capsule by mouth Daily. (Patient taking differently: Take 100 mg by mouth 2 (Two) Times a Day As Needed.) 7 capsule 0   • Misc. Devices (CRUTCH SET) misc 1 Device Daily. 1 each 0   • oxyCODONE-acetaminophen (PERCOCET) 5-325 MG per tablet Take 1-2 tablets by mouth Every 4 (Four) Hours As Needed (Pain). 30 tablet 0   • promethazine (PHENERGAN) 12.5 MG tablet Take 1 tablet by mouth Every 8 (Eight) Hours As Needed for nausea or vomiting. 21 tablet 0     No current facility-administered medications for this visit.      Review of Systems   Constitutional: Negative for chills and fever.   HENT: Negative for congestion.    Respiratory: Negative for shortness of breath.    Cardiovascular: Negative for chest pain and leg swelling.   Gastrointestinal: Negative for constipation, diarrhea, nausea and vomiting.   Musculoskeletal:         Ankle pain   Skin: Negative for wound.   Neurological: Negative for numbness.       OBJECTIVE     Vitals:    05/16/18 1138   BP: 128/74   Pulse: 69   SpO2: 96%       PHYSICAL EXAM  GEN:   A&Ox3, NAD. Pt presents to clinic ambulating without assistance and wearing Casual Shoes. Accompanied by enedina.     Physical Exam   Constitutional: He is oriented to person, place, and time. He appears well-developed and well-nourished.   HENT:   Head: Normocephalic and atraumatic.   Eyes: EOM are normal. Pupils are equal, round, and reactive to light.   Neck: Normal range of motion. Neck supple.   Cardiovascular: Normal rate, regular rhythm, normal heart sounds and intact distal pulses.    Pulmonary/Chest: Effort normal and breath sounds normal.   Abdominal: Soft. Bowel sounds are normal.   Neurological: He is alert and oriented to person, place, and time.   Skin: Skin is warm and dry.   Psychiatric: He has a normal mood and affect. His behavior is normal. Judgment and thought content normal.   Vitals reviewed.      NEURO:   Protective sensation intact to 10/10 sites Right foot, 10/10 site Left foot using [place] monofilament  Light touch sensation present  No Tinel's or Villeux sign.    VASC:  Skin temperature Warm to Warm proximal to distal odilon  DP pulses 2/4 Right, 2/4 Left  PT pulses 2/4 Right, 2/4 Left  CFT 3 sec odilon  Pedal hair growth present  no edema noted odilon  Varicosities absent odilon    MUSC/SKEL:  Muscle Strength Right foot Dorsiflexors 5/5, Plantarflexors 5/5, Evertors 5/5, Invertors 5/5  Muscle Strength Left foot Dorsiflexors 5/5, Plantarflexors 5/5, Evertors guarded/5, Invertors 5/5  ROM of the 1st MTP is full without pain or crepitus  ROM of the MTJ is full without pain or crepitus    ROM of the STJ is full without pain or crepitus    ROM of the ankle joint is decreased on left, mostly due to guarding  Pain with talar tilt  POP of left lateral ankle along ATFL and to lesser degree CFL.   Pain to distal tip  of fibula along peroneal tendons  Minimal POP of right lateral ankle along ATFL  Rectus foot type   Gait pattern: Normal  No gross pedal musculoskeletal deformities noted.     DERM:  Pedal nails x10 are within normal limits of length and thickness  Webspaces are Clean, Dry, and Intact  Skin is normal in turgor and texture with no open wounds or sores appreciated.  Previous incision site at anterior medial aspect of left ankle is fully coapted with no signs of hypertrophied scar      RADIOLOGY/NUCLEAR:  Mri Ankle Left Without Contrast    Result Date: 4/26/2018  Narrative: HISTORY: Previous medial left ankle surgery. Lateral ankle pain.  MRI LEFT ANKLE: Multiplanar imaging left ankle is performed without contrast.  COMPARISON: Plain films from 3/27/2018  FINDINGS: There is an osteochondral defect along the lateral talar dome. There are subchondral cystic changes adjacent the subtalar joint. There is arthritic narrowing and bony spurring along the talonavicular joint. Edema is present within the talus, navicular, and calcaneous tarsal bones. There are no discrete fracture lines. There is moderate calcaneal spurring.  The Achilles tendon is intact. No prominent edema seen along the plantar fascia. There is fluid seen adjacent to the flexor hallux longus tendons and distal muscle which may represent tenosynovitis. Minimal fluid is seen along the posterior tibialis tendon at the level of the ankle.  There is a tear of the peroneal brevis tendon. There is increased signal and thickening of the distal peroneal longus tendon suggesting tendinopathy.  The anterior and posterior tibiofibular ligaments appear intact. The anterior posterior talofibular ligaments are preserved.  There is diffuse soft tissue edema with no loculated fluid collection.      Impression: 1. Findings suspicious for a peroneus brevis tendon tear. Tendinopathy of the peroneus longus tendon favored. 2. Questionable tenosynovitis involving the flexor hallux  longus and posterior tibialis tendons. 3. Osteochondral defect along the lateral talar dome. 4. Scattered edema involving the tarsal likely proximal metatarsal bones. No discrete fracture. 5. Os trigonum with minimal edema seen adjacent to the accessory ossicle. This report was finalized on 04/26/2018 17:01 by Dr. Sherron Valenzuela MD.      LABORATORY/CULTURE RESULTS:      PATHOLOGY RESULTS:       ASSESSMENT/PLAN     Ismael was seen today for follow-up.    Diagnoses and all orders for this visit:    Ankle instability, left  -     Case Request; Standing  -     Instructions on coughing, deep breathing, and incentive spirometry.; Future  -     CBC and Differential; Future  -     Comprehensive metabolic panel; Future  -     ECG 12 Lead; Future  -     XR chest 2 vw; Future  -     clindamycin (CLEOCIN) 900 mg in dextrose (D5W) 5 % 100 mL IVPB; Infuse 900 mg into a venous catheter 1 (One) Time.  -     Case Request    Chronic pain of left ankle  -     Case Request; Standing  -     Instructions on coughing, deep breathing, and incentive spirometry.; Future  -     CBC and Differential; Future  -     Comprehensive metabolic panel; Future  -     ECG 12 Lead; Future  -     XR chest 2 vw; Future  -     clindamycin (CLEOCIN) 900 mg in dextrose (D5W) 5 % 100 mL IVPB; Infuse 900 mg into a venous catheter 1 (One) Time.  -     Case Request    Peroneal tendon tear, left, subsequent encounter  -     Case Request; Standing  -     Instructions on coughing, deep breathing, and incentive spirometry.; Future  -     CBC and Differential; Future  -     Comprehensive metabolic panel; Future  -     ECG 12 Lead; Future  -     XR chest 2 vw; Future  -     clindamycin (CLEOCIN) 900 mg in dextrose (D5W) 5 % 100 mL IVPB; Infuse 900 mg into a venous catheter 1 (One) Time.  -     Case Request    Other orders  -     Follow Anesthesia Guidelines / Standing Orders; Future  -     Obtain informed consent; Future  -     Follow Anesthesia Guidelines / Standing  Orders; Standing  -     Provide instructions to patient regarding NPO status; Future  -     Clorhexidine skin prep; Future  -     Verify NPO Status; Standing  -     Obtain informed consent (if not collected inpatient or PAT); Standing  -     Instructions on coughing, deep breathing, and incentive spirometry.; Standing  -     Notify Physician - Standard; Standing  -     Radiology Technician to Be Present for Intra-Op C-Arm Use; Standing      Comprehensive lower extremity examination and evaluation was performed.  Discussed findings and treatment plan including risks, benefits, and treatment options with patient in detail. Patient agreed with treatment plan.  Patient given options of continued conservative therapy with addition of physical therapy or surgical intervention to correct issues. Patient opts for surgical intervention.   Discussed all options, benefits and risks associated with surgery including but not limited to: pain, bleeding, infection, wound, difficulty walking, amputation. Patient wishes to proceed.   Patient will be scheduled for LEFT Lateral ankle stabilization with Arthrex Internal Brace Augmentation and Peroneal tendon repair.   An After Visit Summary was printed and given to the patient at discharge, including (if requested) any available informative/educational handouts regarding diagnosis, treatment, or medications. All questions were answered to patient/family satisfaction. Should symptoms fail to improve or worsen they agree to call or return to clinic or to go to the Emergency Department. Discussed the importance of following up with any needed screening tests/labs/specialist appointments and any requested follow-up recommended by me today. Importance of maintaining follow-up discussed and patient accepts that missed appointments can delay diagnosis and potentially lead to worsening of conditions.  Return for Post-Op appointment., or sooner if acute issues arise.

## 2018-05-16 NOTE — PROGRESS NOTES
[facility] - PODIATRY    Today's Date: 05/16/18    Patient Name: Ismael Huff  MRN: [id number]  CSN: [id number]  PCP: Edouard Madrigal DO  Referring Provider: No ref. provider found    SUBJECTIVE     Chief Complaint   Patient presents with   • Left Ankle - Follow-up     PT c/o soreness in left ankle, foot giving away without wearing brace, and pain. Denies swelling at present time. Pain scale: 7/10. PCP: Dr. Edouard Madrigal      HPI: Ismael Huff, a 53 y.o.male, comes to clinic as a(n) established patient complaining of left ankle pain. Patient has h/o arthritis, gout, kidney stones. Patient had OCD repair/debridement in November 2016.  Patient states that procedure with subsequent physical therapy was successful and had minimal to no complications with procedure.  Admits to being unable to afford [place] brace.  States that a few weeks ago, his left ankle started to become significantly more painful.  Denies single traumatic event.  He notes that his ankle will give out on him and twist.  Has increased pain with walking, especially on uneven surfaces. He admits to not wearing his CAM boot. Has had MRI since last appt. Admits pain at 7/10 level and described as shooting, aching and throbbing. Right ankle has improved to be minimally painful. Denies any constitutional symptoms. No other pedal complaints at this time.    Past Medical History:   Diagnosis Date   • Ankle pain, left    • Arthritis    • Gout    • History of kidney stones    • History of pneumonia    • Tendonitis of ankle     left   • Wears dentures      Past Surgical History:   Procedure Laterality Date   • ANKLE SURGERY Left    • COLECTOMY PARTIAL / TOTAL  2011    polyps ruptured   • COLONOSCOPY     • KNEE SURGERY Left 1990s    torn cartilage repair   • LEG EXCISION LESION/CYST Left 11/22/2016    Procedure: ANKLE LOOSE BODY EXCISION/REMOVAL, LEFT;  Surgeon: Ajay Comer DPM;  Location: [facility] OR;  Service:      Family History    Problem Relation Age of Onset   • Cancer Mother    • Diabetes Mother    • Heart disease Father    • Cancer Father    • Hypertension Father    • Arthritis Father    • Stroke Father    • Hyperlipidemia Father      Social History     Social History   • Marital status:      Spouse name: N/A   • Number of children: N/A   • Years of education: N/A     Occupational History   • Not on file.     Social History Main Topics   • Smoking status: Never Smoker   • Smokeless tobacco: Current User     Types: Snuff      Comment: ~40 YEARS   • Alcohol use No   • Drug use: No   • Sexual activity: Defer     Other Topics Concern   • Not on file     Social History Narrative   • No narrative on file     Allergies   Allergen Reactions   • Reglan [Metoclopramide] Anaphylaxis   • Allopurinol Rash   • Keflex [Cephalexin] Rash     Current Outpatient Prescriptions   Medication Sig Dispense Refill   • etodolac (LODINE) 200 MG capsule Take 200 mg by mouth Every 8 (Eight) Hours.     • gabapentin (NEURONTIN) 300 MG capsule Take 300 mg by mouth 3 (Three) Times a Day.     • docusate sodium (COLACE) 100 MG capsule Take 1 capsule by mouth Daily. (Patient taking differently: Take 100 mg by mouth 2 (Two) Times a Day As Needed.) 7 capsule 0   • Misc. Devices (CRUTCH SET) misc 1 Device Daily. 1 each 0   • oxyCODONE-acetaminophen (PERCOCET) 5-325 MG per tablet Take 1-2 tablets by mouth Every 4 (Four) Hours As Needed (Pain). 30 tablet 0   • promethazine (PHENERGAN) 12.5 MG tablet Take 1 tablet by mouth Every 8 (Eight) Hours As Needed for nausea or vomiting. 21 tablet 0     No current facility-administered medications for this visit.      Review of Systems   Constitutional: Negative for chills and fever.   HENT: Negative for congestion.    Respiratory: Negative for shortness of breath.    Cardiovascular: Negative for chest pain and leg swelling.   Gastrointestinal: Negative for constipation, diarrhea, nausea and vomiting.   Musculoskeletal:         Ankle pain   Skin: Negative for wound.   Neurological: Negative for numbness.       OBJECTIVE     Vitals:    05/16/18 1138   BP: 128/74   Pulse: 69   SpO2: 96%       PHYSICAL EXAM  GEN:   A&Ox3, NAD. Pt presents to clinic ambulating without assistance and wearing Casual Shoes. Accompanied by enedina.     Physical Exam   Constitutional: He is oriented to person, place, and time. He appears well-developed and well-nourished.   HENT:   Head: Normocephalic and atraumatic.   Eyes: EOM are normal. Pupils are equal, round, and reactive to light.   Neck: Normal range of motion. Neck supple.   Cardiovascular: Normal rate, regular rhythm, normal heart sounds and intact distal pulses.    Pulmonary/Chest: Effort normal and breath sounds normal.   Abdominal: Soft. Bowel sounds are normal.   Neurological: He is alert and oriented to person, place, and time.   Skin: Skin is warm and dry.   Psychiatric: He has a normal mood and affect. His behavior is normal. Judgment and thought content normal.   Vitals reviewed.      NEURO:   Protective sensation intact to 10/10 sites Right foot, 10/10 site Left foot using [place] monofilament  Light touch sensation present  No Tinel's or Villeux sign.    VASC:  Skin temperature Warm to Warm proximal to distal odilon  DP pulses 2/4 Right, 2/4 Left  PT pulses 2/4 Right, 2/4 Left  CFT 3 sec odilon  Pedal hair growth present  no edema noted odilon  Varicosities absent odilon    MUSC/SKEL:  Muscle Strength Right foot Dorsiflexors 5/5, Plantarflexors 5/5, Evertors 5/5, Invertors 5/5  Muscle Strength Left foot Dorsiflexors 5/5, Plantarflexors 5/5, Evertors guarded/5, Invertors 5/5  ROM of the 1st MTP is full without pain or crepitus  ROM of the MTJ is full without pain or crepitus    ROM of the STJ is full without pain or crepitus    ROM of the ankle joint is decreased on left, mostly due to guarding  Pain with talar tilt  POP of left lateral ankle along ATFL and to lesser degree CFL.   Pain to distal tip  of fibula along peroneal tendons  Minimal POP of right lateral ankle along ATFL  Rectus foot type   Gait pattern: Normal  No gross pedal musculoskeletal deformities noted.     DERM:  Pedal nails x10 are within normal limits of length and thickness  Webspaces are Clean, Dry, and Intact  Skin is normal in turgor and texture with no open wounds or sores appreciated.  Previous incision site at anterior medial aspect of left ankle is fully coapted with no signs of hypertrophied scar      RADIOLOGY/NUCLEAR:  Mri Ankle Left Without Contrast    Result Date: 4/26/2018  Narrative: HISTORY: Previous medial left ankle surgery. Lateral ankle pain.  MRI LEFT ANKLE: Multiplanar imaging left ankle is performed without contrast.  COMPARISON: Plain films from 3/27/2018  FINDINGS: There is an osteochondral defect along the lateral talar dome. There are subchondral cystic changes adjacent the subtalar joint. There is arthritic narrowing and bony spurring along the talonavicular joint. Edema is present within the talus, navicular, and calcaneous tarsal bones. There are no discrete fracture lines. There is moderate calcaneal spurring.  The Achilles tendon is intact. No prominent edema seen along the plantar fascia. There is fluid seen adjacent to the flexor hallux longus tendons and distal muscle which may represent tenosynovitis. Minimal fluid is seen along the posterior tibialis tendon at the level of the ankle.  There is a tear of the peroneal brevis tendon. There is increased signal and thickening of the distal peroneal longus tendon suggesting tendinopathy.  The anterior and posterior tibiofibular ligaments appear intact. The anterior posterior talofibular ligaments are preserved.  There is diffuse soft tissue edema with no loculated fluid collection.      Impression: 1. Findings suspicious for a peroneus brevis tendon tear. Tendinopathy of the peroneus longus tendon favored. 2. Questionable tenosynovitis involving the flexor hallux  longus and posterior tibialis tendons. 3. Osteochondral defect along the lateral talar dome. 4. Scattered edema involving the tarsal likely proximal metatarsal bones. No discrete fracture. 5. Os trigonum with minimal edema seen adjacent to the accessory ossicle. This report was finalized on 04/26/2018 17:01 by Dr. Sherron Valenzuela MD.      LABORATORY/CULTURE RESULTS:      PATHOLOGY RESULTS:       ASSESSMENT/PLAN     Ismael was seen today for follow-up.    Diagnoses and all orders for this visit:    Ankle instability, left  -     Case Request; Standing  -     Instructions on coughing, deep breathing, and incentive spirometry.; Future  -     CBC and Differential; Future  -     Comprehensive metabolic panel; Future  -     ECG 12 Lead; Future  -     XR chest 2 vw; Future  -     clindamycin (CLEOCIN) 900 mg in dextrose (D5W) 5 % 100 mL IVPB; Infuse 900 mg into a venous catheter 1 (One) Time.  -     Case Request    Chronic pain of left ankle  -     Case Request; Standing  -     Instructions on coughing, deep breathing, and incentive spirometry.; Future  -     CBC and Differential; Future  -     Comprehensive metabolic panel; Future  -     ECG 12 Lead; Future  -     XR chest 2 vw; Future  -     clindamycin (CLEOCIN) 900 mg in dextrose (D5W) 5 % 100 mL IVPB; Infuse 900 mg into a venous catheter 1 (One) Time.  -     Case Request    Peroneal tendon tear, left, subsequent encounter  -     Case Request; Standing  -     Instructions on coughing, deep breathing, and incentive spirometry.; Future  -     CBC and Differential; Future  -     Comprehensive metabolic panel; Future  -     ECG 12 Lead; Future  -     XR chest 2 vw; Future  -     clindamycin (CLEOCIN) 900 mg in dextrose (D5W) 5 % 100 mL IVPB; Infuse 900 mg into a venous catheter 1 (One) Time.  -     Case Request    Other orders  -     Follow Anesthesia Guidelines / Standing Orders; Future  -     Obtain informed consent; Future  -     Follow Anesthesia Guidelines / Standing  Orders; Standing  -     Provide instructions to patient regarding NPO status; Future  -     Clorhexidine skin prep; Future  -     Verify NPO Status; Standing  -     Obtain informed consent (if not collected inpatient or PAT); Standing  -     Instructions on coughing, deep breathing, and incentive spirometry.; Standing  -     Notify Physician - Standard; Standing  -     Radiology Technician to Be Present for Intra-Op C-Arm Use; Standing      Comprehensive lower extremity examination and evaluation was performed.  Discussed findings and treatment plan including risks, benefits, and treatment options with patient in detail. Patient agreed with treatment plan.  Patient given options of continued conservative therapy with addition of physical therapy or surgical intervention to correct issues. Patient opts for surgical intervention.   Discussed all options, benefits and risks associated with surgery including but not limited to: pain, bleeding, infection, wound, difficulty walking, amputation. Patient wishes to proceed.   Patient will be scheduled for LEFT Lateral ankle stabilization with Arthrex Internal Brace Augmentation and Peroneal tendon repair.   An After Visit Summary was printed and given to the patient at discharge, including (if requested) any available informative/educational handouts regarding diagnosis, treatment, or medications. All questions were answered to patient/family satisfaction. Should symptoms fail to improve or worsen they agree to call or return to clinic or to go to the Emergency Department. Discussed the importance of following up with any needed screening tests/labs/specialist appointments and any requested follow-up recommended by me today. Importance of maintaining follow-up discussed and patient accepts that missed appointments can delay diagnosis and potentially lead to worsening of conditions.  Return for Post-Op appointment., or sooner if acute issues arise.    Lab Frequency Next  Occurrence   Follow Anesthesia Guidelines / Standing Orders Once 11/09/2016   XR Ankle 3+ View Left Once 04/01/2018   XR Foot 3+ View Left Once 04/01/2018       This document has been electronically signed by Ajay Comer DPM on May 16, 2018 12:04 PM

## 2018-05-16 NOTE — PATIENT INSTRUCTIONS
Chronic Ankle Instability Rehab After Surgery  Ask your health care provider which exercises are safe for you. Do exercises exactly as told by your health care provider and adjust them as directed. It is normal to feel mild stretching, pulling, tightness, or discomfort as you do these exercises, but you should stop right away if you feel sudden pain or your pain gets worse. Do not begin these exercises until told by your health care provider.  Stretching and range of motion exercises  These exercises warm up your muscles and joints and improve the movement and flexibility of your ankle. These exercises also help to relieve pain, numbness, and tingling.  Exercise A: Dorsiflexion/plantar flexion     1. Sit with your left / right knee straight or bent. Do not rest your foot on anything.  2. Flex your left / right ankle to tilt the top of your foot toward your shin.  3. Hold this position for __________ seconds.  4. Point your toes downward to tilt the top of your foot away from your shin.  5. Hold this position for __________ seconds.  Repeat __________ times with your knee straight, then __________ times with your knee bent. Complete this exercise __________ times a day.  Exercise B: Ankle alphabet     1. Sit with your left / right leg supported at the lower leg.  ¨ Do not rest your foot on anything.  ¨ Make sure your foot has room to move freely.  2. Think of your left / right foot as a paintbrush, and move your foot to trace each letter of the alphabet in the air. Keep your hip and knee still while you trace. Make the letters as large as you can without feeling discomfort.  3. Trace every letter from A to Z.  Repeat __________ times. Complete this exercise __________ times a day.  Exercise C: Gastrocsoleus     1. Sit on the floor with your left / right leg extended.  2. Loop a belt or towel around the ball of your left / right foot. The ball of your foot is on the walking surface, right under your toes.  3. Keep  your left / right ankle and foot relaxed and keep your knee straight while you use the belt or towel to pull your foot and ankle toward you. You should feel a gentle stretch behind your calf or knee.  4. Hold this position for __________ seconds.  Repeat __________ times. Complete this exercise __________ times a day.  Exercise D: Ankle dorsiflexion, active-assisted     1. Sit on a chair that is placed on a non-carpeted surface.  2. Place your left / right foot on the floor, directly under your knee. Extend your other leg for support.  3. Keeping your heel down, slide your left / right foot back toward the chair until you feel a stretch at your ankle or calf. If you do not feel a stretch, slide your buttocks forward to the edge of the chair.  4. Hold this stretch for __________ seconds.  Repeat __________ times. Complete this stretch __________ times a day.  Strengthening exercises  These exercises build strength and endurance in your ankle. Endurance is the ability to use your muscles for a long time, even after they get tired.  Exercise E: Dorsiflexion with band     1. Secure a rubber exercise band or tube to an object, like a table leg, that will not move if it is pulled on. Secure the other end around your left / right foot.  2. Sit on the floor facing the object with your left / right foot extended. The band or tube should be slightly tense when your foot is relaxed.  3. Slowly flex your left / right ankle and toes to bring your foot toward you.  4. Hold this position for __________ seconds.  5. Let the band or tube slowly pull your foot back to the starting position.  Repeat __________ times. Complete this exercise times a day.  Exercise F: Plantar flexion with band     1. Sit on the floor with your left / right leg extended.  2. Loop a rubber exercise band or tube around the ball of your left / right foot. The ball of your foot is on the walking surface, right under your toes. The band or tube should be  slightly tense when your foot is relaxed.  3. Slowly point your toes downward, pushing them away from you.  4. Hold this position for __________ seconds.  5. Let the band or tube slowly pull your foot back to the starting position.  Repeat __________ times. Complete this exercise __________ times a day.  Exercise G: Ankle eversion with band     1. Secure one end of an exercise band or tubing to a fixed object, such as a table leg or a pole, that will stay still when the band is pulled.  2. Loop the other end of the band around the middle of your left / right foot.  3. Sit on the floor, facing the fixed object. The band should be slightly tense when your foot is relaxed.  4. Make fists with your hands and put them between your knees. This will focus your strengthening at your ankle.  5. Leading with your little toe, slowly push your banded foot outward, away from your body. The band or tube should be adding resistance.  6. Hold this position for __________ seconds.  7. Slowly return your foot to the starting position while controlling the tension in the band.  Repeat __________ times. Complete this exercise __________ times a day.  Exercise H: Ankle inversion with band     1. Secure one end of an exercise band or tubing to a fixed object, such as a table leg or a pole, that will stay still when the band is pulled.  2. Loop the other end of the band around your left / right foot, near your toes.  3. Sit on the floor, facing the fixed object. The band should be slightly tense when your foot is relaxed.  4. Make fists with your hands and put them between your knees. This will focus your strengthening at your ankle.  5. Leading with your big toe, slowly pull your banded foot inward, toward your body. The band or tube should be adding resistance.  6. Hold this position for __________ seconds.  7. Let the band or tube slowly pull your foot back to the starting position.  Repeat __________ times. Complete this exercises  __________ times a day.  Exercise I: Towel curls     1. Sit in a chair on a non-carpeted surface, and put your feet on the floor.  2. Place a towel in front of your feet. If told by your health care provider, add __________ to the end of the towel.  3. Keeping your heel on the floor, put your left / right foot on the towel.  4. Pull the towel toward you by grabbing the towel with your toes and curling them under. Keep your heel on the floor.  Repeat __________ times. Complete this exercise __________ times a day.  This information is not intended to replace advice given to you by your health care provider. Make sure you discuss any questions you have with your health care provider.  Document Released: 04/10/2017 Document Revised: 08/24/2017 Document Reviewed: 12/31/2016  [employer] Interactive Patient Education © 2017 [employer]    Chronic Ankle Instability  Chronic ankle instability is a condition that makes the ankle weak and more likely to give way. The condition is common among athletes.  What are the causes?  This condition is caused by multiple ankle sprains that have not healed properly, leaving the ankle ligaments loose or damaged. Ligaments are tough, fiber-like tissues.  What increases the risk?  This condition is more likely to develop in people who participate in sports in which there is a risk of spraining an ankle. These sports include:  · Cross-country trail running.  · Basketball.  · Baseball.  · Tennis.  · Football.  · Soccer.  What are the signs or symptoms?  Symptoms of this condition include:  · Rolling your ankle repeatedly.  · Swelling.  · Pain.  · Bruising.  · Tenderness.  · Feeling wobbly or unsteady on your foot.  · Difficulty walking on uneven surfaces or in the dark.  How is this diagnosed?  This condition may be diagnosed based on:  · Your symptoms.  · Your medical history.  · A physical exam.  · Imaging tests, such as:  ¨ An X-ray.  ¨ A CT scan.  ¨ MRI.  ¨ An ultrasound.  During your  physical exam, your health care provider may test your balance and compare the movement of your healthy ankle with movement of your unstable ankle.  How is this treated?  Treatment for this condition may involve:  · Wearing a removable boot, brace, or splint.  · Wearing supportive shoes or shoe inserts.  · Applying ice to the ankle to reduce swelling.  · Taking anti-inflammatory pain medicine.  · Doing exercises (physical therapy).  · Not putting any body weight on your ankle for several days.  · Gradually returning to full activity.  · Surgery to repair damaged ligaments.  Usually, surgery is needed only if the condition is severe or if other treatments do not work.  Follow these instructions at home:  If you have a boot, brace, or splint:   · Wear it as told by your health care provider. Remove it only as told by your health care provider.  · Loosen it if your toes tingle, become numb, or turn cold and blue.  · If it is not waterproof:  ¨ Do not let it get wet.  ¨ Cover it with a watertight covering when you take a bath or a shower.  · Keep it clean.  · Ask your health care provider when it is safe to drive with a boot, brace, or splint on your foot.  Managing pain, stiffness, and swelling   · Take over-the-counter and prescription medicines only as told by your health care provider.  · If directed, put ice on the injured area:  ¨ Put ice in a plastic bag.  ¨ Place a towel between your skin and the bag.  ¨ Leave the ice on for 20 minutes, 2-3 times a day.  · Move your toes often to avoid stiffness and to lessen swelling.  · Raise (elevate) the injured area above the level of your heart while you are sitting or lying down.  Activity   · Return to your normal activities as told by your health care provider. Ask your health care provider what activities are safe for you.  · Do not put your full body weight on your ankle until your health care provider says that you can.  · Do not do any activities that make pain or  swelling worse.  · Do exercises as told by your health care provider.  General instructions   · Wear supportive shoes or inserts as told by your health care provider.  · Keep all follow-up visits as told by your health care provider. This is important.  How is this prevented?  · Wear supportive footwear that is appropriate for your athletic activity.  · Avoid athletic activities that cause pain or swelling in your ankle.  · See your health care provider if you have an ankle sprain that causes pain and swelling for more 2-4 weeks.  · Do ankle range-of-motion and strengthening exercises as told by your health care provider before doing any athletic activity.  · If you start a new athletic activity, start gradually to build up your strength and flexibility.  Contact a health care provider if:  · Your condition is not getting better after 2-4 weeks of treatment.  · You cannot put body weight on your ankle without feeling pain.  This information is not intended to replace advice given to you by your health care provider. Make sure you discuss any questions you have with your health care provider.  Document Released: 07/19/2006 Document Revised: 08/24/2017 Document Reviewed: 11/04/2016  [employer] Interactive Patient Education © 2017 [employer]    Surgery for Chronic Ankle Instability, Care After  Refer to this sheet in the next few weeks. These instructions provide you with information about caring for yourself after your procedure. Your health care provider may also give you more specific instructions. Your treatment has been planned according to current medical practices, but problems sometimes occur. Call your health care provider if you have any problems or questions after your procedure.  What can I expect after the procedure?  After the procedure, it is common to have:  · Soreness in the ankle.  · Ankle pain.  · Numbness in the ankle.  Follow these instructions at home:  If you have a splint or brace:   · Wear the  splint or brace as told by your health care provider. Remove it only as told by your health care provider.  · Loosen the splint or brace if your toes tingle, become numb, or turn cold and blue.  · Keep the splint or brace clean.  · Do not let your splint or brace get wet if it is not waterproof.  If you have a cast:   · Do not stick anything inside the cast to scratch your skin. Doing that increases your risk of infection.  · Check the skin around the cast every day. Tell your health care provider about any concerns.  · You may put lotion on dry skin around the edges of the cast. Do not put lotion on the skin underneath the cast.  · Keep the cast clean.  · Do not let your cast get wet if it is not waterproof.  Bathing   · Do not take baths, swim, or use a hot tub until your health care provider approves. Ask your health care provider if you can take showers. You may only be allowed to take sponge baths for bathing.  · If your splint, brace, or cast not waterproof, cover it with a watertight covering when you take a bath or a shower.  Managing pain, stiffness, and swelling     · If directed, put ice on the injured area.  ¨ Put ice in a plastic bag.  ¨ Place a towel between your cast or splint and the bag.  ¨ Leave the ice on for 20 minutes, 2-3 times a day.  · Move your toes often to avoid stiffness and to lessen swelling.  · Raise (elevate) your foot above the level of your heart while you are sitting or lying down.  Driving   · Do not drive or operate heavy machinery while taking prescription pain medicine.  · Do not drive for 24 hours if you received a sedative.  · Ask your health care provider when it is safe to drive if you have a splint, brace, or cast on your foot.  Activity   · Do not use your ankle to support (bear) any of your body weight for 2-6 weeks. Use your crutches or walker as told by your health care provider.  · After 2-6 weeks, you may be changed from your splint, brace, or cast to a walking boot,  and you may start physical therapy.  · Return to your normal activities as told by your health care provider. Ask your health care provider what activities are safe for you.  · Do physical therapy exercises as told by your health care provider or physical therapist.  General instructions   · Do not put pressure on any part of the cast or splint until it is fully hardened. This may take several hours.  · Do not use any tobacco products, such as cigarettes, chewing tobacco, and e-cigarettes. Tobacco can delay bone healing. If you need help quitting, ask your health care provider.  · Take over-the-counter and prescription medicines only as told by your health care provider.  · Keep all follow-up visits as told by your health care provider. This is important.  Contact a health care provider if:  · Your foot or toes feel numb or cold.  · Your pain medicine is not working.  · You have a fever.  Get help right away if:  · You have warmth, tenderness, swelling, or pain in your leg.  · You have chest pain or difficulty breathing.  · Your toenails get darker or they turn blue or gray.  This information is not intended to replace advice given to you by your health care provider. Make sure you discuss any questions you have with your health care provider.  Document Released: 04/10/2017 Document Revised: 08/22/2017 Document Reviewed: 12/31/2016  [employer] Interactive Patient Education © 2017 [employer]    Peroneal Tendinopathy Rehab  Ask your health care provider which exercises are safe for you. Do exercises exactly as told by your health care provider and adjust them as directed. It is normal to feel mild stretching, pulling, tightness, or discomfort as you do these exercises, but you should stop right away if you feel sudden pain or your pain gets worse. Do not begin these exercises until told by your health care provider.  Stretching and range of motion exercises  These exercises warm up your muscles and joints and improve  the movement and flexibility of your ankle. These exercises also help to relieve pain and stiffness.  Exercise A: Gastroc and soleus, standing   6. Stand on the edge of a step on the balls of your feet. The ball of your foot is on the walking surface, right under your toes.  7. Hold onto the railing for balance.  8. Slowly lift your left / right foot, allowing your body weight to press your left / right heel down over the edge of the step. You should feel a stretch in your left / right calf.  9. Hold this position for __________ seconds.  Repeat __________ times with your left / right knee straight and __________ times with your left / right knee bent. Complete this stretch __________ times per day.  Strengthening exercises  These exercises improve the strength and endurance of your foot and ankle. Endurance is the ability to use your muscles for a long time, even after they get tired.  Exercise B: Dorsiflexors     1. Secure a rubber exercise band or tube to an object, like a table leg, that will not move if it is pulled on.  2. Secure the other end of the band around your left / right foot.  3. Sit on the floor, facing the object with your left / right foot extended. The band or tube should be slightly tense when your foot is relaxed.  4. Slowly flex your left / right ankle and toes to bring your foot toward you.  5. Hold this position for __________ seconds.  6. Slowly return your foot to the starting position.  Repeat __________ times. Complete this exercise __________ times per day.  Exercise C: Evertors   5. Sit on the floor with your legs straight out in front of you.  6. Loop a rubber exercise or band or tube around the ball of your left / right foot. The ball of your foot is on the walking surface, right under your toes.  7. Hold the ends of the band in your hands, or secure the band to a stable object.  8. Slowly push your foot outward, away from your other leg.  9. Hold this position for __________  seconds.  10. Slowly return your foot to the starting position.  Repeat __________ times. Complete this exercise __________ times per day.  Exercise D: Standing heel raise (   plantar flexion)  1. Stand with your feet shoulder-width apart with the balls of your feet on a step. The ball of your foot is on the walking surface, right under your toes.  2. Keep your weight spread evenly over the width of your feet while you rise up on your toes. Use a wall or railing to steady yourself, but try not to use it for support.  3. If this exercise is too easy, try these options:  ¨ Shift your weight toward your left / right leg until you feel challenged.  ¨ If told by your health care provider, stand on your left / right leg only.  4. Hold this position for __________ seconds.  Repeat __________ times. Complete this exercise __________ times per day.  Exercise E: Single leg stand  1. Without shoes, stand near a railing or in a doorway. You may hold onto the railing or door frame as needed.  2. Stand on your left / right foot. Keep your big toe down on the floor and try to keep your arch lifted.  ¨ Do not roll to the outside of your foot.  ¨ If this exercise is too easy, you can try it with your eyes closed or while standing on a pillow.  3. Hold this position for __________ seconds.  Repeat __________ times. Complete this exercise __________ times per day.  This information is not intended to replace advice given to you by your health care provider. Make sure you discuss any questions you have with your health care provider.  Document Released: 12/18/2006 Document Revised: 08/24/2017 Document Reviewed: 11/05/2016  [employer] Interactive Patient Education © 2017 [employer]

## 2018-05-18 ENCOUNTER — TELEPHONE (OUTPATIENT)
Dept: PODIATRY | Facility: CLINIC | Age: 53
End: 2018-05-18

## 2018-05-18 NOTE — TELEPHONE ENCOUNTER
Spoke with patient and advised of upcoming surgery and pre work.  Patient was advised of location time and prep.  Patient expressed understanding for all that was discussed.  All information also sent to patient in the mail.

## 2018-05-30 ENCOUNTER — APPOINTMENT (OUTPATIENT)
Dept: PREADMISSION TESTING | Facility: HOSPITAL | Age: 53
End: 2018-05-30

## 2018-09-11 NOTE — PROGRESS NOTES
[facility] - PODIATRY    Today's Date: 09/14/18    Patient Name: Ismael Huff  MRN: [id number]  CSN: [id number]  PCP: Edouard Madrigal DO  Referring Provider: No ref. provider found    SUBJECTIVE     Chief Complaint   Patient presents with   • Left Ankle - Follow-up     PT is here to discuss surgery. PT c/o swelling, cramping, and pain in left ankle. Pain scale: 7/10. PCP: Dr. Edouard Madrigal     HPI: Ismael Huff, a 53 y.o.male, comes to clinic as a(n) established patient complaining of left ankle pain. Patient has h/o arthritis, gout, kidney stones. Patient had OCD repair/debridement in November 2016.  Patient states that procedure with subsequent physical therapy was successful and had minimal to no complications with procedure.  Admits to being unable to afford [place] brace.  States that a few weeks ago, his left ankle started to become significantly more painful.  Denies single traumatic event.  He notes that his ankle will give out on him and twist.  Has increased pain with walking, especially on uneven surfaces. He admits to not wearing his CAM boot. Says he cancelled his previous surgical procedure due to thinking the pain would improve but it has not and he would like to now pursue the procedures. Denies change in presentation. Admits pain at 7/10 level and described as shooting, aching and throbbing. Right ankle has improved to be minimally painful. Denies any constitutional symptoms. No other pedal complaints at this time.    Past Medical History:   Diagnosis Date   • Ankle pain, left    • Arthritis    • Gout    • History of kidney stones    • History of pneumonia    • Tendonitis of ankle     left   • Wears dentures      Past Surgical History:   Procedure Laterality Date   • ANKLE SURGERY Left    • COLECTOMY PARTIAL / TOTAL  2011    polyps ruptured   • COLONOSCOPY     • KNEE SURGERY Left 1990s    torn cartilage repair   • LEG EXCISION LESION/CYST Left 11/22/2016    Procedure: ANKLE  LOOSE BODY EXCISION/REMOVAL, LEFT;  Surgeon: Ajay Comer DPM;  Location: [facility];  Service:      Family History   Problem Relation Age of Onset   • Cancer Mother    • Diabetes Mother    • Heart disease Father    • Cancer Father    • Hypertension Father    • Arthritis Father    • Stroke Father    • Hyperlipidemia Father      Social History     Social History   • Marital status:      Spouse name: N/A   • Number of children: N/A   • Years of education: N/A     Occupational History   • Not on file.     Social History Main Topics   • Smoking status: Never Smoker   • Smokeless tobacco: Current User     Types: Snuff      Comment: ~40 YEARS   • Alcohol use No   • Drug use: No   • Sexual activity: Defer     Other Topics Concern   • Not on file     Social History Narrative   • No narrative on file     Allergies   Allergen Reactions   • Reglan [Metoclopramide] Anaphylaxis   • Allopurinol Rash   • Keflex [Cephalexin] Rash     No current outpatient prescriptions on file.     No current facility-administered medications for this visit.      Review of Systems   Constitutional: Negative for chills and fever.   HENT: Negative for congestion.    Respiratory: Negative for shortness of breath.    Cardiovascular: Negative for chest pain and leg swelling.   Gastrointestinal: Negative for constipation, diarrhea, nausea and vomiting.   Musculoskeletal:        Ankle pain   Skin: Negative for wound.   Neurological: Negative for numbness.       OBJECTIVE     Vitals:    09/14/18 1447   BP: 130/82   Pulse: 71   SpO2: 95%       PHYSICAL EXAM  GEN:   A&Ox3, NAD. Pt presents to clinic ambulating without assistance and wearing Casual Shoes. Accompanied by none.     Physical Exam   Constitutional: He is oriented to person, place, and time. He appears well-developed and well-nourished.   HENT:   Head: Normocephalic and atraumatic.   Eyes: Pupils are equal, round, and reactive to light. EOM are normal.   Neck: Normal range of motion.  Neck supple.   Cardiovascular: Normal rate, regular rhythm, normal heart sounds and intact distal pulses.    Pulmonary/Chest: Effort normal and breath sounds normal.   Abdominal: Soft. Bowel sounds are normal.   Neurological: He is alert and oriented to person, place, and time.   Skin: Skin is warm and dry.   Psychiatric: He has a normal mood and affect. His behavior is normal. Judgment and thought content normal.   Vitals reviewed.      NEURO:   Protective sensation intact to 10/10 sites Right foot, 10/10 site Left foot using [place] monofilament  Light touch sensation present  No Tinel's or Villeux sign.    VASC:  Skin temperature Warm to Warm proximal to distal odilon  DP pulses 2/4 Right, 2/4 Left  PT pulses 2/4 Right, 2/4 Left  CFT 3 sec odilon  Pedal hair growth present  no edema noted odilon  Varicosities absent odilon    MUSC/SKEL:  Muscle Strength Right foot Dorsiflexors 5/5, Plantarflexors 5/5, Evertors 5/5, Invertors 5/5  Muscle Strength Left foot Dorsiflexors 5/5, Plantarflexors 5/5, Evertors guarded/5, Invertors 5/5  ROM of the 1st MTP is full without pain or crepitus  ROM of the MTJ is full without pain or crepitus    ROM of the STJ is full without pain or crepitus    ROM of the ankle joint is decreased on left, mostly due to guarding  Pain with talar tilt  POP of left lateral ankle along ATFL and to lesser degree CFL.   Pain to distal tip of fibula along peroneal tendons  Minimal POP of right lateral ankle along ATFL  Rectus foot type   Gait pattern: Normal  No gross pedal musculoskeletal deformities noted.     DERM:  Pedal nails x10 are within normal limits of length and thickness  Webspaces are Clean, Dry, and Intact  Skin is normal in turgor and texture with no open wounds or sores appreciated.  Previous incision site at anterior medial aspect of left ankle is fully coapted with no signs of hypertrophied scar      RADIOLOGY/NUCLEAR:  No results found.   IMPRESSION:  1. Findings suspicious for a  peroneus brevis tendon tear. Tendinopathy  of the peroneus longus tendon favored.   2. Questionable tenosynovitis involving the flexor hallux longus and  posterior tibialis tendons.  3. Osteochondral defect along the lateral talar dome.  4. Scattered edema involving the tarsal likely proximal metatarsal  bones. No discrete fracture.  5. Os trigonum with minimal edema seen adjacent to the accessory  ossicle.    LABORATORY/CULTURE RESULTS:      PATHOLOGY RESULTS:       ASSESSMENT/PLAN     Ismael was seen today for follow-up.    Diagnoses and all orders for this visit:    Chronic pain of left ankle  -     Case Request; Standing  -     Instructions on coughing, deep breathing, and incentive spirometry.; Future  -     CBC and Differential; Future  -     Comprehensive metabolic panel; Future  -     ECG 12 Lead; Future  -     Type and screen; Future  -     XR chest 2 vw; Future  -     clindamycin (CLEOCIN) 900 mg in dextrose (D5W) 5 % 100 mL IVPB; Infuse 900 mg into a venous catheter 1 (One) Time.  -     Case Request    Ankle instability, left  -     Case Request; Standing  -     Instructions on coughing, deep breathing, and incentive spirometry.; Future  -     CBC and Differential; Future  -     Comprehensive metabolic panel; Future  -     ECG 12 Lead; Future  -     Type and screen; Future  -     XR chest 2 vw; Future  -     clindamycin (CLEOCIN) 900 mg in dextrose (D5W) 5 % 100 mL IVPB; Infuse 900 mg into a venous catheter 1 (One) Time.  -     Case Request    Peroneal tendon tear, left, subsequent encounter  -     Case Request; Standing  -     Instructions on coughing, deep breathing, and incentive spirometry.; Future  -     CBC and Differential; Future  -     Comprehensive metabolic panel; Future  -     ECG 12 Lead; Future  -     Type and screen; Future  -     XR chest 2 vw; Future  -     clindamycin (CLEOCIN) 900 mg in dextrose (D5W) 5 % 100 mL IVPB; Infuse 900 mg into a venous catheter 1 (One) Time.  -     Case  Request    Other orders  -     Follow Anesthesia Guidelines / Standing Orders; Future  -     Obtain informed consent; Future  -     Follow Anesthesia Guidelines / Standing Orders; Standing  -     Provide instructions to patient regarding NPO status; Future  -     Clorhexidine skin prep; Future  -     Verify NPO Status; Standing  -     Obtain informed consent (if not collected inpatient or PAT); Standing  -     Nerve Block; Standing  -     Instructions on coughing, deep breathing, and incentive spirometry.; Standing  -     Notify Physician - Standard; Standing  -     Radiology Technician to Be Present for Intra-Op C-Arm Use; Standing  -     NPO After Midnight      Comprehensive lower extremity examination and evaluation was performed.  Discussed findings and treatment plan including risks, benefits, and treatment options with patient in detail. Patient agreed with treatment plan.  Patient given options of continued conservative therapy with addition of physical therapy or surgical intervention to correct issues. Patient opts for surgical intervention.   Discussed all options, benefits and risks associated with surgery including but not limited to: pain, bleeding, infection, wound, difficulty walking, amputation. Patient wishes to proceed.   Patient will be scheduled for LEFT Lateral ankle stabilization with Arthrex Internal Brace Augmentation and Peroneal tendon repair.   An After Visit Summary was printed and given to the patient at discharge, including (if requested) any available informative/educational handouts regarding diagnosis, treatment, or medications. All questions were answered to patient/family satisfaction. Should symptoms fail to improve or worsen they agree to call or return to clinic or to go to the Emergency Department. Discussed the importance of following up with any needed screening tests/labs/specialist appointments and any requested follow-up recommended by me today. Importance of maintaining  follow-up discussed and patient accepts that missed appointments can delay diagnosis and potentially lead to worsening of conditions.  Return for Post-Op appointment., or sooner if acute issues arise.    Lab Frequency Next Occurrence   Follow Anesthesia Guidelines / Standing Orders Once 11/09/2016   XR Ankle 3+ View Left Once 04/01/2018   XR Foot 3+ View Left Once 04/01/2018       This document has been electronically signed by Ajay Comer DPM on September 14, 2018 3:18 PM

## 2018-09-13 ENCOUNTER — TELEPHONE (OUTPATIENT)
Dept: PODIATRY | Facility: CLINIC | Age: 53
End: 2018-09-13

## 2018-09-13 NOTE — TELEPHONE ENCOUNTER
Attempted to reach patient to remind him of appointment with Dr. Davis Comer on 09/14/2018 at 2:45 pm. Patient does not have a voice mailbox set up at this time.

## 2018-09-14 ENCOUNTER — OFFICE VISIT (OUTPATIENT)
Dept: PODIATRY | Facility: CLINIC | Age: 53
End: 2018-09-14

## 2018-09-14 VITALS
HEART RATE: 71 BPM | WEIGHT: 212 LBS | BODY MASS INDEX: 28.71 KG/M2 | HEIGHT: 72 IN | OXYGEN SATURATION: 95 % | DIASTOLIC BLOOD PRESSURE: 82 MMHG | SYSTOLIC BLOOD PRESSURE: 130 MMHG

## 2018-09-14 DIAGNOSIS — M25.372 ANKLE INSTABILITY, LEFT: ICD-10-CM

## 2018-09-14 DIAGNOSIS — G89.29 CHRONIC PAIN OF LEFT ANKLE: Primary | ICD-10-CM

## 2018-09-14 DIAGNOSIS — S86.312D PERONEAL TENDON TEAR, LEFT, SUBSEQUENT ENCOUNTER: ICD-10-CM

## 2018-09-14 DIAGNOSIS — M25.572 CHRONIC PAIN OF LEFT ANKLE: Primary | ICD-10-CM

## 2018-09-14 PROCEDURE — 99214 OFFICE O/P EST MOD 30 MIN: CPT | Performed by: PODIATRIST

## 2018-09-14 NOTE — PATIENT INSTRUCTIONS
Chronic Ankle Instability Rehab After Surgery  Ask your health care provider which exercises are safe for you. Do exercises exactly as told by your health care provider and adjust them as directed. It is normal to feel mild stretching, pulling, tightness, or discomfort as you do these exercises, but you should stop right away if you feel sudden pain or your pain gets worse. Do not begin these exercises until told by your health care provider.  Stretching and range of motion exercises  These exercises warm up your muscles and joints and improve the movement and flexibility of your ankle. These exercises also help to relieve pain, numbness, and tingling.  Exercise A: Dorsiflexion/plantar flexion    1. Sit with your left / right knee straight or bent. Do not rest your foot on anything.  2. Flex your left / right ankle to tilt the top of your foot toward your shin.  3. Hold this position for __________ seconds.  4. Point your toes downward to tilt the top of your foot away from your shin.  5. Hold this position for __________ seconds.  Repeat __________ times with your knee straight, then __________ times with your knee bent. Complete this exercise __________ times a day.  Exercise B: Ankle alphabet    1. Sit with your left / right leg supported at the lower leg.  ? Do not rest your foot on anything.  ? Make sure your foot has room to move freely.  2. Think of your left / right foot as a paintbrush, and move your foot to trace each letter of the alphabet in the air. Keep your hip and knee still while you trace. Make the letters as large as you can without feeling discomfort.  3. Trace every letter from A to Z.  Repeat __________ times. Complete this exercise __________ times a day.  Exercise C: Gastrocsoleus    1. Sit on the floor with your left / right leg extended.  2. Loop a belt or towel around the ball of your left / right foot. The ball of your foot is on the walking surface, right under your toes.  3. Keep your  left / right ankle and foot relaxed and keep your knee straight while you use the belt or towel to pull your foot and ankle toward you. You should feel a gentle stretch behind your calf or knee.  4. Hold this position for __________ seconds.  Repeat __________ times. Complete this exercise __________ times a day.  Exercise D: Ankle dorsiflexion, active-assisted    1. Sit on a chair that is placed on a non-carpeted surface.  2. Place your left / right foot on the floor, directly under your knee. Extend your other leg for support.  3. Keeping your heel down, slide your left / right foot back toward the chair until you feel a stretch at your ankle or calf. If you do not feel a stretch, slide your buttocks forward to the edge of the chair.  4. Hold this stretch for __________ seconds.  Repeat __________ times. Complete this stretch __________ times a day.  Strengthening exercises  These exercises build strength and endurance in your ankle. Endurance is the ability to use your muscles for a long time, even after they get tired.  Exercise E: Dorsiflexion with band    1. Secure a rubber exercise band or tube to an object, like a table leg, that will not move if it is pulled on. Secure the other end around your left / right foot.  2. Sit on the floor facing the object with your left / right foot extended. The band or tube should be slightly tense when your foot is relaxed.  3. Slowly flex your left / right ankle and toes to bring your foot toward you.  4. Hold this position for __________ seconds.  5. Let the band or tube slowly pull your foot back to the starting position.  Repeat __________ times. Complete this exercise times a day.  Exercise F: Plantar flexion with band    1. Sit on the floor with your left / right leg extended.  2. Loop a rubber exercise band or tube around the ball of your left / right foot. The ball of your foot is on the walking surface, right under your toes. The band or tube should be slightly  tense when your foot is relaxed.  3. Slowly point your toes downward, pushing them away from you.  4. Hold this position for __________ seconds.  5. Let the band or tube slowly pull your foot back to the starting position.  Repeat __________ times. Complete this exercise __________ times a day.  Exercise G: Ankle eversion with band    1. Secure one end of an exercise band or tubing to a fixed object, such as a table leg or a pole, that will stay still when the band is pulled.  2. Loop the other end of the band around the middle of your left / right foot.  3. Sit on the floor, facing the fixed object. The band should be slightly tense when your foot is relaxed.  4. Make fists with your hands and put them between your knees. This will focus your strengthening at your ankle.  5. Leading with your little toe, slowly push your banded foot outward, away from your body. The band or tube should be adding resistance.  6. Hold this position for __________ seconds.  7. Slowly return your foot to the starting position while controlling the tension in the band.  Repeat __________ times. Complete this exercise __________ times a day.  Exercise H: Ankle inversion with band    1. Secure one end of an exercise band or tubing to a fixed object, such as a table leg or a pole, that will stay still when the band is pulled.  2. Loop the other end of the band around your left / right foot, near your toes.  3. Sit on the floor, facing the fixed object. The band should be slightly tense when your foot is relaxed.  4. Make fists with your hands and put them between your knees. This will focus your strengthening at your ankle.  5. Leading with your big toe, slowly pull your banded foot inward, toward your body. The band or tube should be adding resistance.  6. Hold this position for __________ seconds.  7. Let the band or tube slowly pull your foot back to the starting position.  Repeat __________ times. Complete this exercises __________  times a day.  Exercise I: Towel curls    1. Sit in a chair on a non-carpeted surface, and put your feet on the floor.  2. Place a towel in front of your feet. If told by your health care provider, add __________ to the end of the towel.  3. Keeping your heel on the floor, put your left / right foot on the towel.  4. Pull the towel toward you by grabbing the towel with your toes and curling them under. Keep your heel on the floor.  Repeat __________ times. Complete this exercise __________ times a day.  This information is not intended to replace advice given to you by your health care provider. Make sure you discuss any questions you have with your health care provider.  Document Released: 04/10/2017 Document Revised: 08/24/2017 Document Reviewed: 12/31/2016  [employer] Interactive Patient Education © 2018 [employer]

## 2018-09-14 NOTE — H&P
[facility] - PODIATRY    Today's Date: 09/14/18    Patient Name: Ismael Huff  MRN: [id number]  CSN: [id number]  PCP: Edouard Madrigal DO  Referring Provider: No ref. provider found    SUBJECTIVE     Chief Complaint   Patient presents with   • Left Ankle - Follow-up     PT is here to discuss surgery. PT c/o swelling, cramping, and pain in left ankle. Pain scale: 7/10. PCP: Dr. Edouard Madrigal     HPI: Ismael Huff, a 53 y.o.male, comes to clinic as a(n) established patient complaining of left ankle pain. Patient has h/o arthritis, gout, kidney stones. Patient had OCD repair/debridement in November 2016.  Patient states that procedure with subsequent physical therapy was successful and had minimal to no complications with procedure.  Admits to being unable to afford [place] brace.  States that a few weeks ago, his left ankle started to become significantly more painful.  Denies single traumatic event.  He notes that his ankle will give out on him and twist.  Has increased pain with walking, especially on uneven surfaces. He admits to not wearing his CAM boot. Says he cancelled his previous surgical procedure due to thinking the pain would improve but it has not and he would like to now pursue the procedures. Denies change in presentation. Admits pain at 7/10 level and described as shooting, aching and throbbing. Right ankle has improved to be minimally painful. Denies any constitutional symptoms. No other pedal complaints at this time.    Past Medical History:   Diagnosis Date   • Ankle pain, left    • Arthritis    • Gout    • History of kidney stones    • History of pneumonia    • Tendonitis of ankle     left   • Wears dentures      Past Surgical History:   Procedure Laterality Date   • ANKLE SURGERY Left    • COLECTOMY PARTIAL / TOTAL  2011    polyps ruptured   • COLONOSCOPY     • KNEE SURGERY Left 1990s    torn cartilage repair   • LEG EXCISION LESION/CYST Left 11/22/2016    Procedure: ANKLE  LOOSE BODY EXCISION/REMOVAL, LEFT;  Surgeon: Ajay Comer DPM;  Location: [facility] OR;  Service:      Family History   Problem Relation Age of Onset   • Cancer Mother    • Diabetes Mother    • Heart disease Father    • Cancer Father    • Hypertension Father    • Arthritis Father    • Stroke Father    • Hyperlipidemia Father      Social History     Social History   • Marital status:      Spouse name: N/A   • Number of children: N/A   • Years of education: N/A     Occupational History   • Not on file.     Social History Main Topics   • Smoking status: Never Smoker   • Smokeless tobacco: Current User     Types: Snuff      Comment: ~40 YEARS   • Alcohol use No   • Drug use: No   • Sexual activity: Defer     Other Topics Concern   • Not on file     Social History Narrative   • No narrative on file     Allergies   Allergen Reactions   • Reglan [Metoclopramide] Anaphylaxis   • Allopurinol Rash   • Keflex [Cephalexin] Rash     No current outpatient prescriptions on file.     No current facility-administered medications for this visit.      Review of Systems   Constitutional: Negative for chills and fever.   HENT: Negative for congestion.    Respiratory: Negative for shortness of breath.    Cardiovascular: Negative for chest pain and leg swelling.   Gastrointestinal: Negative for constipation, diarrhea, nausea and vomiting.   Musculoskeletal:        Ankle pain   Skin: Negative for wound.   Neurological: Negative for numbness.       OBJECTIVE     Vitals:    09/14/18 1447   BP: 130/82   Pulse: 71   SpO2: 95%       PHYSICAL EXAM  GEN:   A&Ox3, NAD. Pt presents to clinic ambulating without assistance and wearing Casual Shoes. Accompanied by none.     Physical Exam   Constitutional: He is oriented to person, place, and time. He appears well-developed and well-nourished.   HENT:   Head: Normocephalic and atraumatic.   Eyes: Pupils are equal, round, and reactive to light. EOM are normal.   Neck: Normal range of motion.  Neck supple.   Cardiovascular: Normal rate, regular rhythm, normal heart sounds and intact distal pulses.    Pulmonary/Chest: Effort normal and breath sounds normal.   Abdominal: Soft. Bowel sounds are normal.   Neurological: He is alert and oriented to person, place, and time.   Skin: Skin is warm and dry.   Psychiatric: He has a normal mood and affect. His behavior is normal. Judgment and thought content normal.   Vitals reviewed.      NEURO:   Protective sensation intact to 10/10 sites Right foot, 10/10 site Left foot using Cleves-Ashley monofilament  Light touch sensation present  No Tinel's or Villeux sign.    VASC:  Skin temperature Warm to Warm proximal to distal odilon  DP pulses 2/4 Right, 2/4 Left  PT pulses 2/4 Right, 2/4 Left  CFT 3 sec odilon  Pedal hair growth present  no edema noted odilon  Varicosities absent odilon    MUSC/SKEL:  Muscle Strength Right foot Dorsiflexors 5/5, Plantarflexors 5/5, Evertors 5/5, Invertors 5/5  Muscle Strength Left foot Dorsiflexors 5/5, Plantarflexors 5/5, Evertors guarded/5, Invertors 5/5  ROM of the 1st MTP is full without pain or crepitus  ROM of the MTJ is full without pain or crepitus    ROM of the STJ is full without pain or crepitus    ROM of the ankle joint is decreased on left, mostly due to guarding  Pain with talar tilt  POP of left lateral ankle along ATFL and to lesser degree CFL.   Pain to distal tip of fibula along peroneal tendons  Minimal POP of right lateral ankle along ATFL  Rectus foot type   Gait pattern: Normal  No gross pedal musculoskeletal deformities noted.     DERM:  Pedal nails x10 are within normal limits of length and thickness  Webspaces are Clean, Dry, and Intact  Skin is normal in turgor and texture with no open wounds or sores appreciated.  Previous incision site at anterior medial aspect of left ankle is fully coapted with no signs of hypertrophied scar      RADIOLOGY/NUCLEAR:  No results found.   IMPRESSION:  1. Findings suspicious for a  peroneus brevis tendon tear. Tendinopathy  of the peroneus longus tendon favored.   2. Questionable tenosynovitis involving the flexor hallux longus and  posterior tibialis tendons.  3. Osteochondral defect along the lateral talar dome.  4. Scattered edema involving the tarsal likely proximal metatarsal  bones. No discrete fracture.  5. Os trigonum with minimal edema seen adjacent to the accessory  ossicle.    LABORATORY/CULTURE RESULTS:      PATHOLOGY RESULTS:       ASSESSMENT/PLAN     Ismael was seen today for follow-up.    Diagnoses and all orders for this visit:    Chronic pain of left ankle  -     Case Request; Standing  -     Instructions on coughing, deep breathing, and incentive spirometry.; Future  -     CBC and Differential; Future  -     Comprehensive metabolic panel; Future  -     ECG 12 Lead; Future  -     Type and screen; Future  -     XR chest 2 vw; Future  -     clindamycin (CLEOCIN) 900 mg in dextrose (D5W) 5 % 100 mL IVPB; Infuse 900 mg into a venous catheter 1 (One) Time.  -     Case Request    Ankle instability, left  -     Case Request; Standing  -     Instructions on coughing, deep breathing, and incentive spirometry.; Future  -     CBC and Differential; Future  -     Comprehensive metabolic panel; Future  -     ECG 12 Lead; Future  -     Type and screen; Future  -     XR chest 2 vw; Future  -     clindamycin (CLEOCIN) 900 mg in dextrose (D5W) 5 % 100 mL IVPB; Infuse 900 mg into a venous catheter 1 (One) Time.  -     Case Request    Peroneal tendon tear, left, subsequent encounter  -     Case Request; Standing  -     Instructions on coughing, deep breathing, and incentive spirometry.; Future  -     CBC and Differential; Future  -     Comprehensive metabolic panel; Future  -     ECG 12 Lead; Future  -     Type and screen; Future  -     XR chest 2 vw; Future  -     clindamycin (CLEOCIN) 900 mg in dextrose (D5W) 5 % 100 mL IVPB; Infuse 900 mg into a venous catheter 1 (One) Time.  -     Case  Request    Other orders  -     Follow Anesthesia Guidelines / Standing Orders; Future  -     Obtain informed consent; Future  -     Follow Anesthesia Guidelines / Standing Orders; Standing  -     Provide instructions to patient regarding NPO status; Future  -     Clorhexidine skin prep; Future  -     Verify NPO Status; Standing  -     Obtain informed consent (if not collected inpatient or PAT); Standing  -     Nerve Block; Standing  -     Instructions on coughing, deep breathing, and incentive spirometry.; Standing  -     Notify Physician - Standard; Standing  -     Radiology Technician to Be Present for Intra-Op C-Arm Use; Standing  -     NPO After Midnight      Comprehensive lower extremity examination and evaluation was performed.  Discussed findings and treatment plan including risks, benefits, and treatment options with patient in detail. Patient agreed with treatment plan.  Patient given options of continued conservative therapy with addition of physical therapy or surgical intervention to correct issues. Patient opts for surgical intervention.   Discussed all options, benefits and risks associated with surgery including but not limited to: pain, bleeding, infection, wound, difficulty walking, amputation. Patient wishes to proceed.   Patient will be scheduled for LEFT Lateral ankle stabilization with Arthrex Internal Brace Augmentation and Peroneal tendon repair.   An After Visit Summary was printed and given to the patient at discharge, including (if requested) any available informative/educational handouts regarding diagnosis, treatment, or medications. All questions were answered to patient/family satisfaction. Should symptoms fail to improve or worsen they agree to call or return to clinic or to go to the Emergency Department. Discussed the importance of following up with any needed screening tests/labs/specialist appointments and any requested follow-up recommended by me today. Importance of maintaining  follow-up discussed and patient accepts that missed appointments can delay diagnosis and potentially lead to worsening of conditions.  Return for Post-Op appointment., or sooner if acute issues arise.

## 2018-09-17 ENCOUNTER — TELEPHONE (OUTPATIENT)
Dept: PODIATRY | Facility: CLINIC | Age: 53
End: 2018-09-17

## 2018-09-17 NOTE — TELEPHONE ENCOUNTER
Spoke with patient notifying him of pre-admission appointment on 09/18/2018 with an arrival time of 12:15 and his surgical procedure on 09/19/2018 with an arrival time of 10:30 am. Patient gave verbal understanding of what was discussed at this time.

## 2018-09-17 NOTE — TELEPHONE ENCOUNTER
Attempted to reach patient regarding pre-admission appointment and surgical procedure with Dr. Davis Comer on 09/19/2018. Patient does not have voice messaging set at this time.

## 2018-09-17 NOTE — TELEPHONE ENCOUNTER
Left message and callback number regarding patient's pre-admission appointment and surgical procedure with Dr. Davis Comer on 09/19/2018.

## 2018-09-18 ENCOUNTER — HOSPITAL ENCOUNTER (OUTPATIENT)
Dept: GENERAL RADIOLOGY | Facility: HOSPITAL | Age: 53
Discharge: HOME | End: 2018-09-18
Admitting: PODIATRIST

## 2018-09-18 ENCOUNTER — APPOINTMENT (OUTPATIENT)
Dept: PREADMISSION TESTING | Facility: HOSPITAL | Age: 53
End: 2018-09-18

## 2018-09-18 VITALS
HEART RATE: 67 BPM | BODY MASS INDEX: 30.02 KG/M2 | OXYGEN SATURATION: 97 % | SYSTOLIC BLOOD PRESSURE: 117 MMHG | WEIGHT: 209.66 LBS | RESPIRATION RATE: 16 BRPM | HEIGHT: 70 IN | DIASTOLIC BLOOD PRESSURE: 70 MMHG

## 2018-09-18 DIAGNOSIS — G89.29 CHRONIC PAIN OF LEFT ANKLE: ICD-10-CM

## 2018-09-18 DIAGNOSIS — M25.572 CHRONIC PAIN OF LEFT ANKLE: ICD-10-CM

## 2018-09-18 DIAGNOSIS — M25.372 ANKLE INSTABILITY, LEFT: ICD-10-CM

## 2018-09-18 DIAGNOSIS — S86.312D PERONEAL TENDON TEAR, LEFT, SUBSEQUENT ENCOUNTER: ICD-10-CM

## 2018-09-18 LAB
ABO GROUP BLD: NORMAL
ALBUMIN SERPL-MCNC: 4.6 G/DL (ref 3.5–5)
ALBUMIN/GLOB SERPL: 1.5 G/DL (ref 1.1–2.5)
ALP SERPL-CCNC: 63 U/L (ref 24–120)
ALT SERPL W P-5'-P-CCNC: 32 U/L (ref 0–54)
ANION GAP SERPL CALCULATED.3IONS-SCNC: 10 MMOL/L (ref 4–13)
AST SERPL-CCNC: 32 U/L (ref 7–45)
BASOPHILS # BLD AUTO: 0.06 10*3/MM3 (ref 0–0.2)
BASOPHILS NFR BLD AUTO: 0.8 % (ref 0–2)
BILIRUB SERPL-MCNC: 0.7 MG/DL (ref 0.1–1)
BLD GP AB SCN SERPL QL: NEGATIVE
BUN BLD-MCNC: 10 MG/DL (ref 5–21)
BUN/CREAT SERPL: 6.4 (ref 7–25)
CALCIUM SPEC-SCNC: 9.5 MG/DL (ref 8.4–10.4)
CHLORIDE SERPL-SCNC: 109 MMOL/L (ref 98–110)
CO2 SERPL-SCNC: 27 MMOL/L (ref 24–31)
CREAT BLD-MCNC: 1.56 MG/DL (ref 0.5–1.4)
DEPRECATED RDW RBC AUTO: 47.7 FL (ref 40–54)
EOSINOPHIL # BLD AUTO: 0.12 10*3/MM3 (ref 0–0.7)
EOSINOPHIL NFR BLD AUTO: 1.6 % (ref 0–4)
ERYTHROCYTE [DISTWIDTH] IN BLOOD BY AUTOMATED COUNT: 12.9 % (ref 12–15)
GFR SERPL CREATININE-BSD FRML MDRD: 47 ML/MIN/1.73
GLOBULIN UR ELPH-MCNC: 3.1 GM/DL
GLUCOSE BLD-MCNC: 80 MG/DL (ref 70–100)
HCT VFR BLD AUTO: 39.6 % (ref 40–52)
HGB BLD-MCNC: 14.1 G/DL (ref 14–18)
IMM GRANULOCYTES # BLD: 0.02 10*3/MM3 (ref 0–0.03)
IMM GRANULOCYTES NFR BLD: 0.3 % (ref 0–5)
LYMPHOCYTES # BLD AUTO: 2.18 10*3/MM3 (ref 0.72–4.86)
LYMPHOCYTES NFR BLD AUTO: 28.2 % (ref 15–45)
MCH RBC QN AUTO: 36.2 PG (ref 28–32)
MCHC RBC AUTO-ENTMCNC: 35.6 G/DL (ref 33–36)
MCV RBC AUTO: 101.5 FL (ref 82–95)
MONOCYTES # BLD AUTO: 0.58 10*3/MM3 (ref 0.19–1.3)
MONOCYTES NFR BLD AUTO: 7.5 % (ref 4–12)
NEUTROPHILS # BLD AUTO: 4.78 10*3/MM3 (ref 1.87–8.4)
NEUTROPHILS NFR BLD AUTO: 61.6 % (ref 39–78)
NRBC BLD MANUAL-RTO: 0 /100 WBC (ref 0–0)
PLATELET # BLD AUTO: 282 10*3/MM3 (ref 130–400)
PMV BLD AUTO: 10.1 FL (ref 6–12)
POTASSIUM BLD-SCNC: 4 MMOL/L (ref 3.5–5.3)
PROT SERPL-MCNC: 7.7 G/DL (ref 6.3–8.7)
RBC # BLD AUTO: 3.9 10*6/MM3 (ref 4.8–5.9)
RH BLD: POSITIVE
SODIUM BLD-SCNC: 146 MMOL/L (ref 135–145)
T&S EXPIRATION DATE: NORMAL
WBC NRBC COR # BLD: 7.74 10*3/MM3 (ref 4.8–10.8)

## 2018-09-18 PROCEDURE — 36415 COLL VENOUS BLD VENIPUNCTURE: CPT

## 2018-09-18 PROCEDURE — 86900 BLOOD TYPING SEROLOGIC ABO: CPT | Performed by: PODIATRIST

## 2018-09-18 PROCEDURE — 93010 ELECTROCARDIOGRAM REPORT: CPT | Performed by: INTERNAL MEDICINE

## 2018-09-18 PROCEDURE — 86850 RBC ANTIBODY SCREEN: CPT | Performed by: PODIATRIST

## 2018-09-18 PROCEDURE — 80053 COMPREHEN METABOLIC PANEL: CPT | Performed by: PODIATRIST

## 2018-09-18 PROCEDURE — 85025 COMPLETE CBC W/AUTO DIFF WBC: CPT | Performed by: PODIATRIST

## 2018-09-18 PROCEDURE — 71046 X-RAY EXAM CHEST 2 VIEWS: CPT

## 2018-09-18 PROCEDURE — 93005 ELECTROCARDIOGRAM TRACING: CPT

## 2018-09-18 PROCEDURE — 86901 BLOOD TYPING SEROLOGIC RH(D): CPT | Performed by: PODIATRIST

## 2018-09-18 NOTE — DISCHARGE INSTRUCTIONS
DAY OF SURGERY INSTRUCTIONS        YOUR SURGEON: Dr. Comer    PROCEDURE: Ankle Stabilization, Tendon Repair    DATE OF SURGERY: September 19, 2018    ARRIVAL TIME: AS DIRECTED BY OFFICE    DAY OF SURGERY TAKE ONLY THESE MEDICATIONS UNLESS OTHERWISE INSTRUCTED BY YOUR PHYSICIAN: NONE        MANAGING PAIN AFTER SURGERY    We know you are probably wondering what your pain will be like after surgery.  Following surgery it is unrealistic to expect you will not have pain.   Pain is how our bodies let us know that something is wrong or cautions us to be careful.  That said, our goal is to make your pain tolerable.    Methods we may use to treat your pain include (oral or IV medications, PCAs, epidurals, nerve blocks, etc.)   While some procedures require IV pain medications for a short time after surgery, transitioning to pain medications by mouth allows for better management of pain.   Your nurse will encourage you to take oral pain medications whenever possible.  IV medications work almost immediately, but only last a short while.  Taking medications by mouth allows for a more constant level of medication in your blood stream for a longer period of time.      Once your pain is out of control it is harder to get back under control.  It is important you are aware when your next dose of pain medication is due.  If you are admitted, your nurse may write the time of your next dose on the white board in your room to help you remember.      We are interested in your pain and encourage you to inform us about aggravating factors during your visit.   Many times a simple repositioning every few hours can make a big difference.    If your physician says it is okay, do not let your pain prevent you from getting out of bed. Be sure to call your nurse for assistance prior to getting up so you do not fall.      Before surgery, please decide your tolerable pain goal.  These faces help describe the pain ratings we use on a 0-10 scale.    Be prepared to tell us your goal and whether or not you take pain or anxiety medications at home.          BEFORE YOU COME TO THE HOSPITAL  (Pre-op instructions)  • Do not eat, drink, smoke or chew gum after midnight the night before surgery.  This also includes no mints.  • Morning of surgery take only the medicines you have been instructed with a sip of water unless otherwise instructed  by your physician.  • Do not shave, wear makeup or dark nail polish.  • Remove all jewelry including rings.  • Leave anything you consider valuable at home.  • Leave your suitcase in the car until after your surgery.  • Bring the following with you if applicable:  o Picture ID and insurance, Medicare or Medicaid cards  o Co-pay/deductible required by insurance (cash, check, credit card)  o Copy of advance directive, living will or power-of- documents if not brought to PAT  o CPAP or BIPAP mask and tubing  o Relaxation aids (MP3 player, book, magazine)  • On the day of surgery check in at registration located at the main entrance of the hospital.       Outpatient Surgery Guidelines, Adult  Outpatient procedures are those for which the person having the procedure is allowed to go home the same day as the procedure. Various procedures are done on an outpatient basis. You should follow some general guidelines if you will be having an outpatient procedure.  LET YOUR HEALTH CARE PROVIDER KNOW ABOUT:  · Any allergies you have.  · All medicines you are taking, including vitamins, herbs, eye drops, creams, and over-the-counter medicines.  · Previous problems you or members of your family have had with the use of anesthetics.  · Any blood disorders you have.  · Previous surgeries you have had.  · Medical conditions you have.  RISKS AND COMPLICATIONS  Your health care provider will discuss possible risks and complications with you before surgery. Common risks and complications include:    · Problems due to the use of  anesthetics.  · Blood loss and replacement (does not apply to minor surgical procedures).  · Temporary increase in pain due to surgery.  · Uncorrected pain or problems that the surgery was meant to correct.  · Infection.  · New damage.  BEFORE THE PROCEDURE  · Ask your health care provider about changing or stopping your regular medicines. You may need to stop taking certain medicines in the days or weeks before the procedure.  · Stop smoking at least 2 weeks before surgery. This lowers your risk for complications during and after surgery. Ask your health care provider for help with this if needed.  · Eat your usual meals and a light supper the day before surgery. Continue fluid intake. Do not drink alcohol.  · Do not eat or drink after midnight the night before your surgery.   · Arrange for someone to take you home and to stay with you for 24 hours after the procedure. Medicine given for your procedure may affect your ability to drive or to care for yourself.  · Call your health care provider's office if you develop an illness or problem that may prevent you from safely having your procedure.  AFTER THE PROCEDURE  After surgery, you will be taken to a recovery area, where your progress will be monitored. If there are no complications, you will be allowed to go home when you are awake, stable, and taking fluids well. You may have numbness around the surgical site. Healing will take some time. You will have tenderness at the surgical site and may have some swelling and bruising. You may also have some nausea.  HOME CARE INSTRUCTIONS  · Do not drive for 24 hours, or as directed by your health care provider. Do not drive while taking prescription pain medicines.  · Do not drink alcohol for 24 hours.  · Do not make important decisions or sign legal documents for 24 hours.  · You may resume a normal diet and activities as directed.  · Do not lift anything heavier than 10 pounds (4.5 kg) or play contact sports until your  health care provider says it is okay.  · Change your bandages (dressings) as directed.  · Only take over-the-counter or prescription medicines as directed by your health care provider.  · Follow up with your health care provider as directed.  SEEK MEDICAL CARE IF:  · You have increased bleeding (more than a small spot) from the surgical site.  · You have redness, swelling, or increasing pain in the wound.  · You see pus coming from the wound.  · You have a fever.  · You notice a bad smell coming from the wound or dressing.  · You feel lightheaded or faint.  · You develop a rash.  · You have trouble breathing.  · You develop allergies.  MAKE SURE YOU:  · Understand these instructions.  · Will watch your condition.  · Will get help right away if you are not doing well or get worse.     This information is not intended to replace advice given to you by your health care provider. Make sure you discuss any questions you have with your health care provider.     Document Released: 09/12/2002 Document Revised: 05/03/2016 Document Reviewed: 05/22/2014  [employer] Interactive Patient Education ©2016 [employer]       Fall Prevention in Hospitals, Adult  As a hospital patient, your condition and the treatments you receive can increase your risk for falls. Some additional risk factors for falls in a hospital include:  · Being in an unfamiliar environment.  · Being on bed rest.  · Your surgery.  · Taking certain medicines.  · Your tubing requirements, such as intravenous (IV) therapy or catheters.  It is important that you learn how to decrease fall risks while at the hospital. Below are important tips that can help prevent falls.  SAFETY TIPS FOR PREVENTING FALLS  Talk about your risk of falling.  · Ask your health care provider why you are at risk for falling. Is it your medicine, illness, tubing placement, or something else?  · Make a plan with your health care provider to keep you safe from falls.  · Ask your health care  provider or pharmacist about side effects of your medicines. Some medicines can make you dizzy or affect your coordination.  Ask for help.  · Ask for help before getting out of bed. You may need to press your call button.  · Ask for assistance in getting safely to the toilet.  · Ask for a walker or cane to be put at your bedside. Ask that most of the side rails on your bed be placed up before your health care provider leaves the room.  · Ask family or friends to sit with you.  · Ask for things that are out of your reach, such as your glasses, hearing aids, telephone, bedside table, or call button.  Follow these tips to avoid falling:  · Stay lying or seated, rather than standing, while waiting for help.  · Wear rubber-soled slippers or shoes whenever you walk in the hospital.  · Avoid quick, sudden movements.  ¨ Change positions slowly.  ¨ Sit on the side of your bed before standing.  ¨ Stand up slowly and wait before you start to walk.  · Let your health care provider know if there is a spill on the floor.  · Pay careful attention to the medical equipment, electrical cords, and tubes around you.  · When you need help, use your call button by your bed or in the bathroom. Wait for one of your health care providers to help you.  · If you feel dizzy or unsure of your footing, return to bed and wait for assistance.  · Avoid being distracted by the TV, telephone, or another person in your room.  · Do not lean or support yourself on rolling objects, such as IV poles or bedside tables.     This information is not intended to replace advice given to you by your health care provider. Make sure you discuss any questions you have with your health care provider.     Document Released: 12/15/2001 Document Revised: 01/08/2016 Document Reviewed: 08/25/2013  [employer] Interactive Patient Education ©2016 [employer]       Surgical Site Infections FAQs  What is a Surgical Site Infection (SSI)?  A surgical site infection is an  infection that occurs after surgery in the part of the body where the surgery took place. Most patients who have surgery do not develop an infection. However, infections develop in about 1 to 3 out of every 100 patients who have surgery.  Some of the common symptoms of a surgical site infection are:  · Redness and pain around the area where you had surgery  · Drainage of cloudy fluid from your surgical wound  · Fever  Can SSIs be treated?  Yes. Most surgical site infections can be treated with antibiotics. The antibiotic given to you depends on the bacteria (germs) causing the infection. Sometimes patients with SSIs also need another surgery to treat the infection.  What are some of the things that hospitals are doing to prevent SSIs?  To prevent SSIs, doctors, nurses, and other healthcare providers:  · Clean their hands and arms up to their elbows with an antiseptic agent just before the surgery.  · Clean their hands with soap and water or an alcohol-based hand rub before and after caring for each patient.  · May remove some of your hair immediately before your surgery using electric clippers if the hair is in the same area where the procedure will occur. They should not shave you with a razor.  · Wear special hair covers, masks, gowns, and gloves during surgery to keep the surgery area clean.  · Give you antibiotics before your surgery starts. In most cases, you should get antibiotics within 60 minutes before the surgery starts and the antibiotics should be stopped within 24 hours after surgery.  · Clean the skin at the site of your surgery with a special soap that kills germs.  What can I do to help prevent SSIs?  Before your surgery:  · Tell your doctor about other medical problems you may have. Health problems such as allergies, diabetes, and obesity could affect your surgery and your treatment.  · Quit smoking. Patients who smoke get more infections. Talk to your doctor about how you can quit before your  surgery.  · Do not shave near where you will have surgery. Shaving with a razor can irritate your skin and make it easier to develop an infection.  At the time of your surgery:  · Speak up if someone tries to shave you with a razor before surgery. Ask why you need to be shaved and talk with your surgeon if you have any concerns.  · Ask if you will get antibiotics before surgery.  After your surgery:  · Make sure that your healthcare providers clean their hands before examining you, either with soap and water or an alcohol-based hand rub.  · If you do not see your providers clean their hands, please ask them to do so.  · Family and friends who visit you should not touch the surgical wound or dressings.  · Family and friends should clean their hands with soap and water or an alcohol-based hand rub before and after visiting you. If you do not see them clean their hands, ask them to clean their hands.  What do I need to do when I go home from the hospital?  · Before you go home, your doctor or nurse should explain everything you need to know about taking care of your wound. Make sure you understand how to care for your wound before you leave the hospital.  · Always clean your hands before and after caring for your wound.  · Before you go home, make sure you know who to contact if you have questions or problems after you get home.  · If you have any symptoms of an infection, such as redness and pain at the surgery site, drainage, or fever, call your doctor immediately.  If you have additional questions, please ask your doctor or nurse.  Developed and co-sponsored by The Society for Healthcare Epidemiology of [place] (SHEA); Infectious Diseases Society of [place] (IDSA); American Hospital Association; Association for Professionals in Infection Control and Epidemiology (APIC); Centers for Disease Control and Prevention (CDC); and The Joint Commission.     This information is not intended to replace advice given to you by  your health care provider. Make sure you discuss any questions you have with your health care provider.     Document Released: 12/23/2014 Document Revised: 01/08/2016 Document Reviewed: 03/02/2016  [employer] Interactive Patient Education ©2016 [employer]       [facility]  CHG 4% Patient Instruction Sheet    Preparing the Skin Before Surgery  Preparing or “prepping” skin before surgery can reduce the risk of infection at the surgical site. To make the process easier,[facility] has chosen 4% Chlorhexidine Gluconate (CHG) antiseptic solution.   The steps below outline the prepping process and should be carefully followed.                                                                                                                                                      Prep the skin at the following time(s):                                                      We recommend you shower the night before surgery, and again the morning of surgery with the 4% CHG antiseptic solution using half of the bottle and a cloth each time.  Dress in clean clothes/sleepwear after showering.  See instructions below for application.          Do not apply any lotions or moisturizers.       Do not shave the area to be prepped for at least 2 days prior to surgery.    Clipping the hair may be done immediately prior to your surgery at the hospital    if needed.    Directions:  Thoroughly rinse your body with water.  Apply 4% CHG to a cloth and wash skin gently, paying special attention to the operative site.  Rinse again thoroughly.  Once you have begun using this product do not apply anything else to your skin. If itching or redness persists, rinse affected areas and discontinue use.    When using this product:  • Keep out of eyes, ears, and mouth.  • If solution should contact these areas, rinse out promptly and thoroughly with water.  • For external use only.  • Do not use in genital area, on your face or  head.      PATIENT/FAMILY/RESPONSIBLE PARTY VERBALIZES UNDERSTANDING OF ABOVE EDUCATION.  COPY OF PAIN SCALE GIVEN AND REVIEWED WITH VERBALIZED UNDERSTANDING.

## 2018-09-19 ENCOUNTER — ANESTHESIA (OUTPATIENT)
Dept: PERIOP | Facility: HOSPITAL | Age: 53
End: 2018-09-19

## 2018-09-19 ENCOUNTER — APPOINTMENT (OUTPATIENT)
Dept: GENERAL RADIOLOGY | Facility: HOSPITAL | Age: 53
End: 2018-09-19

## 2018-09-19 ENCOUNTER — ANESTHESIA EVENT (OUTPATIENT)
Dept: PERIOP | Facility: HOSPITAL | Age: 53
End: 2018-09-19

## 2018-09-19 ENCOUNTER — HOSPITAL ENCOUNTER (OUTPATIENT)
Facility: HOSPITAL | Age: 53
Setting detail: HOSPITAL OUTPATIENT SURGERY
Discharge: HOME | End: 2018-09-19
Attending: PODIATRIST | Admitting: PODIATRIST

## 2018-09-19 VITALS
SYSTOLIC BLOOD PRESSURE: 125 MMHG | OXYGEN SATURATION: 94 % | TEMPERATURE: 97.8 F | RESPIRATION RATE: 16 BRPM | DIASTOLIC BLOOD PRESSURE: 80 MMHG | HEART RATE: 95 BPM

## 2018-09-19 DIAGNOSIS — G89.29 CHRONIC PAIN OF LEFT ANKLE: ICD-10-CM

## 2018-09-19 DIAGNOSIS — M25.372 ANKLE INSTABILITY, LEFT: ICD-10-CM

## 2018-09-19 DIAGNOSIS — S86.312D PERONEAL TENDON TEAR, LEFT, SUBSEQUENT ENCOUNTER: ICD-10-CM

## 2018-09-19 DIAGNOSIS — M25.572 CHRONIC PAIN OF LEFT ANKLE: ICD-10-CM

## 2018-09-19 PROCEDURE — 27680 RELEASE OF LOWER LEG TENDON: CPT | Performed by: PODIATRIST

## 2018-09-19 PROCEDURE — 29515 APPLICATION SHORT LEG SPLINT: CPT | Performed by: PODIATRIST

## 2018-09-19 PROCEDURE — 25010000002 ONDANSETRON PER 1 MG: Performed by: NURSE ANESTHETIST, CERTIFIED REGISTERED

## 2018-09-19 PROCEDURE — 25010000002 FENTANYL CITRATE (PF) 100 MCG/2ML SOLUTION: Performed by: ANESTHESIOLOGY

## 2018-09-19 PROCEDURE — 88305 TISSUE EXAM BY PATHOLOGIST: CPT | Performed by: PODIATRIST

## 2018-09-19 PROCEDURE — 76000 FLUOROSCOPY <1 HR PHYS/QHP: CPT

## 2018-09-19 PROCEDURE — 25010000002 DEXAMETHASONE PER 1 MG: Performed by: ANESTHESIOLOGY

## 2018-09-19 PROCEDURE — C1713 ANCHOR/SCREW BN/BN,TIS/BN: HCPCS | Performed by: PODIATRIST

## 2018-09-19 PROCEDURE — 25010000002 SUCCINYLCHOLINE PER 20 MG: Performed by: NURSE ANESTHETIST, CERTIFIED REGISTERED

## 2018-09-19 PROCEDURE — 27658 REPAIR OF LEG TENDON EACH: CPT | Performed by: PODIATRIST

## 2018-09-19 PROCEDURE — 25010000002 PROPOFOL 10 MG/ML EMULSION: Performed by: NURSE ANESTHETIST, CERTIFIED REGISTERED

## 2018-09-19 PROCEDURE — 25010000002 ROPIVACAINE PER 1 MG: Performed by: ANESTHESIOLOGY

## 2018-09-19 PROCEDURE — 73600 X-RAY EXAM OF ANKLE: CPT

## 2018-09-19 PROCEDURE — 25010000002 MIDAZOLAM PER 1 MG: Performed by: ANESTHESIOLOGY

## 2018-09-19 PROCEDURE — 27698 REPAIR OF ANKLE LIGAMENT: CPT | Performed by: PODIATRIST

## 2018-09-19 DEVICE — SUT/ANCH BIOCOMP SUTTAK 1 SUT W/NDL NO1 3X14: Type: IMPLANTABLE DEVICE | Status: FUNCTIONAL

## 2018-09-19 DEVICE — SYS IMP INTERNALBRACE REPR AUG LIG W/FIBRTAPE: Type: IMPLANTABLE DEVICE | Status: FUNCTIONAL

## 2018-09-19 RX ORDER — OXYCODONE AND ACETAMINOPHEN 7.5; 325 MG/1; MG/1
2 TABLET ORAL ONCE AS NEEDED
Status: DISCONTINUED | OUTPATIENT
Start: 2018-09-19 | End: 2018-09-19 | Stop reason: HOSPADM

## 2018-09-19 RX ORDER — MIDAZOLAM HYDROCHLORIDE 1 MG/ML
1 INJECTION INTRAMUSCULAR; INTRAVENOUS
Status: DISCONTINUED | OUTPATIENT
Start: 2018-09-19 | End: 2018-09-19 | Stop reason: HOSPADM

## 2018-09-19 RX ORDER — PROPOFOL 10 MG/ML
VIAL (ML) INTRAVENOUS AS NEEDED
Status: DISCONTINUED | OUTPATIENT
Start: 2018-09-19 | End: 2018-09-19 | Stop reason: SURG

## 2018-09-19 RX ORDER — IPRATROPIUM BROMIDE AND ALBUTEROL SULFATE 2.5; .5 MG/3ML; MG/3ML
3 SOLUTION RESPIRATORY (INHALATION) ONCE AS NEEDED
Status: DISCONTINUED | OUTPATIENT
Start: 2018-09-19 | End: 2018-09-19 | Stop reason: HOSPADM

## 2018-09-19 RX ORDER — SODIUM CHLORIDE 0.9 % (FLUSH) 0.9 %
3 SYRINGE (ML) INJECTION AS NEEDED
Status: DISCONTINUED | OUTPATIENT
Start: 2018-09-19 | End: 2018-09-19 | Stop reason: HOSPADM

## 2018-09-19 RX ORDER — ROPIVACAINE HYDROCHLORIDE 5 MG/ML
INJECTION, SOLUTION EPIDURAL; INFILTRATION; PERINEURAL AS NEEDED
Status: DISCONTINUED | OUTPATIENT
Start: 2018-09-19 | End: 2018-09-19 | Stop reason: SURG

## 2018-09-19 RX ORDER — SODIUM CHLORIDE, SODIUM LACTATE, POTASSIUM CHLORIDE, CALCIUM CHLORIDE 600; 310; 30; 20 MG/100ML; MG/100ML; MG/100ML; MG/100ML
1000 INJECTION, SOLUTION INTRAVENOUS CONTINUOUS
Status: DISCONTINUED | OUTPATIENT
Start: 2018-09-19 | End: 2018-09-19 | Stop reason: HOSPADM

## 2018-09-19 RX ORDER — FENTANYL CITRATE 50 UG/ML
25 INJECTION, SOLUTION INTRAMUSCULAR; INTRAVENOUS
Status: DISCONTINUED | OUTPATIENT
Start: 2018-09-19 | End: 2018-09-19 | Stop reason: HOSPADM

## 2018-09-19 RX ORDER — MIDAZOLAM HYDROCHLORIDE 1 MG/ML
2 INJECTION INTRAMUSCULAR; INTRAVENOUS
Status: DISCONTINUED | OUTPATIENT
Start: 2018-09-19 | End: 2018-09-19 | Stop reason: HOSPADM

## 2018-09-19 RX ORDER — DOCUSATE SODIUM 100 MG/1
100 CAPSULE, LIQUID FILLED ORAL DAILY
Qty: 7 CAPSULE | Refills: 0 | Status: SHIPPED | OUTPATIENT
Start: 2018-09-19 | End: 2018-10-31

## 2018-09-19 RX ORDER — PHENYLEPHRINE HCL IN 0.9% NACL 0.8MG/10ML
SYRINGE (ML) INTRAVENOUS AS NEEDED
Status: DISCONTINUED | OUTPATIENT
Start: 2018-09-19 | End: 2018-09-19 | Stop reason: SURG

## 2018-09-19 RX ORDER — MAGNESIUM HYDROXIDE 1200 MG/15ML
LIQUID ORAL AS NEEDED
Status: DISCONTINUED | OUTPATIENT
Start: 2018-09-19 | End: 2018-09-19 | Stop reason: HOSPADM

## 2018-09-19 RX ORDER — ROCURONIUM BROMIDE 10 MG/ML
INJECTION, SOLUTION INTRAVENOUS AS NEEDED
Status: DISCONTINUED | OUTPATIENT
Start: 2018-09-19 | End: 2018-09-19 | Stop reason: SURG

## 2018-09-19 RX ORDER — IBUPROFEN 600 MG/1
600 TABLET ORAL ONCE AS NEEDED
Status: DISCONTINUED | OUTPATIENT
Start: 2018-09-19 | End: 2018-09-19 | Stop reason: HOSPADM

## 2018-09-19 RX ORDER — FENTANYL CITRATE 50 UG/ML
25 INJECTION, SOLUTION INTRAMUSCULAR; INTRAVENOUS AS NEEDED
Status: DISCONTINUED | OUTPATIENT
Start: 2018-09-19 | End: 2018-09-19 | Stop reason: HOSPADM

## 2018-09-19 RX ORDER — ONDANSETRON 2 MG/ML
4 INJECTION INTRAMUSCULAR; INTRAVENOUS ONCE AS NEEDED
Status: DISCONTINUED | OUTPATIENT
Start: 2018-09-19 | End: 2018-09-19 | Stop reason: HOSPADM

## 2018-09-19 RX ORDER — DEXAMETHASONE SODIUM PHOSPHATE 4 MG/ML
4 INJECTION, SOLUTION INTRA-ARTICULAR; INTRALESIONAL; INTRAMUSCULAR; INTRAVENOUS; SOFT TISSUE ONCE AS NEEDED
Status: COMPLETED | OUTPATIENT
Start: 2018-09-19 | End: 2018-09-19

## 2018-09-19 RX ORDER — METOCLOPRAMIDE HYDROCHLORIDE 5 MG/ML
5 INJECTION INTRAMUSCULAR; INTRAVENOUS
Status: DISCONTINUED | OUTPATIENT
Start: 2018-09-19 | End: 2018-09-19 | Stop reason: HOSPADM

## 2018-09-19 RX ORDER — SODIUM CHLORIDE 0.9 % (FLUSH) 0.9 %
1-10 SYRINGE (ML) INJECTION AS NEEDED
Status: DISCONTINUED | OUTPATIENT
Start: 2018-09-19 | End: 2018-09-19 | Stop reason: HOSPADM

## 2018-09-19 RX ORDER — NALOXONE HCL 0.4 MG/ML
0.4 VIAL (ML) INJECTION AS NEEDED
Status: DISCONTINUED | OUTPATIENT
Start: 2018-09-19 | End: 2018-09-19 | Stop reason: HOSPADM

## 2018-09-19 RX ORDER — CLINDAMYCIN PHOSPHATE 900 MG/50ML
900 INJECTION INTRAVENOUS ONCE
Status: COMPLETED | OUTPATIENT
Start: 2018-09-19 | End: 2018-09-19

## 2018-09-19 RX ORDER — SUCCINYLCHOLINE CHLORIDE 20 MG/ML
INJECTION INTRAMUSCULAR; INTRAVENOUS AS NEEDED
Status: DISCONTINUED | OUTPATIENT
Start: 2018-09-19 | End: 2018-09-19 | Stop reason: SURG

## 2018-09-19 RX ORDER — ASPIRIN 325 MG
325 TABLET, DELAYED RELEASE (ENTERIC COATED) ORAL DAILY
Qty: 30 TABLET | Refills: 0 | Status: SHIPPED | OUTPATIENT
Start: 2018-09-19 | End: 2018-10-19

## 2018-09-19 RX ORDER — PROMETHAZINE HYDROCHLORIDE 12.5 MG/1
12.5 TABLET ORAL EVERY 8 HOURS PRN
Qty: 21 TABLET | Refills: 0 | Status: SHIPPED | OUTPATIENT
Start: 2018-09-19 | End: 2018-10-31

## 2018-09-19 RX ORDER — ONDANSETRON 2 MG/ML
INJECTION INTRAMUSCULAR; INTRAVENOUS AS NEEDED
Status: DISCONTINUED | OUTPATIENT
Start: 2018-09-19 | End: 2018-09-19 | Stop reason: SURG

## 2018-09-19 RX ORDER — SODIUM CHLORIDE, SODIUM LACTATE, POTASSIUM CHLORIDE, CALCIUM CHLORIDE 600; 310; 30; 20 MG/100ML; MG/100ML; MG/100ML; MG/100ML
9 INJECTION, SOLUTION INTRAVENOUS CONTINUOUS
Status: DISCONTINUED | OUTPATIENT
Start: 2018-09-19 | End: 2018-09-19 | Stop reason: HOSPADM

## 2018-09-19 RX ORDER — LABETALOL HYDROCHLORIDE 5 MG/ML
5 INJECTION, SOLUTION INTRAVENOUS
Status: DISCONTINUED | OUTPATIENT
Start: 2018-09-19 | End: 2018-09-19 | Stop reason: HOSPADM

## 2018-09-19 RX ORDER — OXYCODONE AND ACETAMINOPHEN 10; 325 MG/1; MG/1
1 TABLET ORAL EVERY 6 HOURS PRN
Qty: 28 TABLET | Refills: 0 | Status: SHIPPED | OUTPATIENT
Start: 2018-09-19 | End: 2018-12-12

## 2018-09-19 RX ORDER — OXYCODONE AND ACETAMINOPHEN 10; 325 MG/1; MG/1
1 TABLET ORAL ONCE AS NEEDED
Status: DISCONTINUED | OUTPATIENT
Start: 2018-09-19 | End: 2018-09-19 | Stop reason: HOSPADM

## 2018-09-19 RX ORDER — MEPERIDINE HYDROCHLORIDE 25 MG/ML
12.5 INJECTION INTRAMUSCULAR; INTRAVENOUS; SUBCUTANEOUS
Status: DISCONTINUED | OUTPATIENT
Start: 2018-09-19 | End: 2018-09-19 | Stop reason: HOSPADM

## 2018-09-19 RX ORDER — DEXTROSE MONOHYDRATE 25 G/50ML
12.5 INJECTION, SOLUTION INTRAVENOUS AS NEEDED
Status: DISCONTINUED | OUTPATIENT
Start: 2018-09-19 | End: 2018-09-19 | Stop reason: HOSPADM

## 2018-09-19 RX ORDER — LIDOCAINE HYDROCHLORIDE 20 MG/ML
INJECTION, SOLUTION INFILTRATION; PERINEURAL AS NEEDED
Status: DISCONTINUED | OUTPATIENT
Start: 2018-09-19 | End: 2018-09-19 | Stop reason: SURG

## 2018-09-19 RX ORDER — BUPIVACAINE HYDROCHLORIDE 5 MG/ML
INJECTION, SOLUTION PERINEURAL AS NEEDED
Status: DISCONTINUED | OUTPATIENT
Start: 2018-09-19 | End: 2018-09-19 | Stop reason: HOSPADM

## 2018-09-19 RX ADMIN — Medication 160 MCG: at 12:25

## 2018-09-19 RX ADMIN — LIDOCAINE HYDROCHLORIDE 0.5 ML: 10 INJECTION, SOLUTION EPIDURAL; INFILTRATION; INTRACAUDAL; PERINEURAL at 10:58

## 2018-09-19 RX ADMIN — SODIUM CHLORIDE, POTASSIUM CHLORIDE, SODIUM LACTATE AND CALCIUM CHLORIDE 1000 ML: 600; 310; 30; 20 INJECTION, SOLUTION INTRAVENOUS at 10:58

## 2018-09-19 RX ADMIN — Medication 160 MCG: at 12:33

## 2018-09-19 RX ADMIN — ROCURONIUM BROMIDE 5 MG: 10 INJECTION INTRAVENOUS at 12:15

## 2018-09-19 RX ADMIN — EPHEDRINE SULFATE 25 MG: 50 INJECTION INTRAMUSCULAR; INTRAVENOUS; SUBCUTANEOUS at 12:50

## 2018-09-19 RX ADMIN — ROPIVACAINE HYDROCHLORIDE 0.2 ML: 5 INJECTION, SOLUTION EPIDURAL; INFILTRATION; PERINEURAL at 12:55

## 2018-09-19 RX ADMIN — ONDANSETRON HYDROCHLORIDE 4 MG: 2 SOLUTION INTRAMUSCULAR; INTRAVENOUS at 13:45

## 2018-09-19 RX ADMIN — DEXAMETHASONE SODIUM PHOSPHATE 4 MG: 4 INJECTION, SOLUTION INTRA-ARTICULAR; INTRALESIONAL; INTRAMUSCULAR; INTRAVENOUS; SOFT TISSUE at 12:03

## 2018-09-19 RX ADMIN — ROPIVACAINE HYDROCHLORIDE 20 ML: 5 INJECTION, SOLUTION EPIDURAL; INFILTRATION; PERINEURAL at 12:06

## 2018-09-19 RX ADMIN — SUCCINYLCHOLINE CHLORIDE 100 MG: 20 INJECTION, SOLUTION INTRAMUSCULAR; INTRAVENOUS at 12:15

## 2018-09-19 RX ADMIN — EPHEDRINE SULFATE 25 MG: 50 INJECTION INTRAMUSCULAR; INTRAVENOUS; SUBCUTANEOUS at 12:45

## 2018-09-19 RX ADMIN — MIDAZOLAM HYDROCHLORIDE 2 MG: 1 INJECTION, SOLUTION INTRAMUSCULAR; INTRAVENOUS at 12:03

## 2018-09-19 RX ADMIN — LIDOCAINE HYDROCHLORIDE 60 MG: 20 INJECTION, SOLUTION INFILTRATION; PERINEURAL at 12:15

## 2018-09-19 RX ADMIN — CLINDAMYCIN PHOSPHATE 900 MG: 18 INJECTION, SOLUTION INTRAVENOUS at 12:25

## 2018-09-19 RX ADMIN — PROPOFOL 200 MG: 10 INJECTION, EMULSION INTRAVENOUS at 12:15

## 2018-09-19 RX ADMIN — Medication 160 MCG: at 12:30

## 2018-09-19 RX ADMIN — FENTANYL CITRATE 100 MCG: 50 INJECTION, SOLUTION INTRAMUSCULAR; INTRAVENOUS at 12:03

## 2018-09-19 NOTE — ANESTHESIA PREPROCEDURE EVALUATION
Anesthesia Evaluation     Patient summary reviewed   no history of anesthetic complications:  NPO Solid Status: > 8 hours             Airway   Mallampati: I  TM distance: >3 FB  Neck ROM: full  Dental    (+) edentulous    Pulmonary    (-) asthma, sleep apnea, not a smoker  Cardiovascular   Exercise tolerance: excellent (>7 METS)    (-) pacemaker, past MI, angina, cardiac stents      Neuro/Psych  (-) seizures, TIA, CVA  GI/Hepatic/Renal/Endo    (-) GERD, liver disease, no renal disease, diabetes    Musculoskeletal     Abdominal    Substance History      OB/GYN          Other                        Anesthesia Plan    ASA 1     general     intravenous induction   Anesthetic plan, all risks, benefits, and alternatives have been provided, discussed and informed consent has been obtained with: patient.

## 2018-09-19 NOTE — ANESTHESIA POSTPROCEDURE EVALUATION
Patient: Ismael Huff    Procedure Summary     Date:  09/19/18 Room / Location:  [facility] OR [address] [place] [address] PAD OR    Anesthesia Start:  1215 Anesthesia Stop:  1417    Procedure:  Lateral Ankle Stabilization, Peroneal Tendon Repair - Left (Left Ankle) Diagnosis:       Ankle instability, left      Chronic pain of left ankle      Peroneal tendon tear, left, subsequent encounter      (Ankle instability, left [M25.372])      (Chronic pain of left ankle [M25.572, G89.29])      (Peroneal tendon tear, left, subsequent encounter [S86.312D])    Surgeon:  Ajay Comer DPM Provider:  Suhail Tran CRNA    Anesthesia Type:  general ASA Status:  1          Anesthesia Type: general  Last vitals  BP   125/80 (09/19/18 1550)   Temp   97.8 °F (36.6 °C) (09/19/18 1500)   Pulse   95 (09/19/18 1550)   Resp   16 (09/19/18 1550)     SpO2   94 % (09/19/18 1550)     Post Anesthesia Care and Evaluation    PONV Status: none  Comments: Patient d/c from PACU prior to anes eval based on Raven score.  Please see RN notes for details of d/c criteria.    Blood pressure 125/80, pulse 95, temperature 97.8 °F (36.6 °C), temperature source Temporal Artery , resp. rate 16, SpO2 94 %.

## 2018-09-19 NOTE — BRIEF OP NOTE
ANKLE LIGAMENT RECONSTRUCTION  Progress Note    Ismael Huff  9/19/2018    Pre-op Diagnosis:   Ankle instability, left [M25.372]  Chronic pain of left ankle [M25.572, G89.29]  Peroneal tendon tear, left, subsequent encounter [S86.312D]       Post-Op Diagnosis Codes:     * Ankle instability, left [M25.372]     * Chronic pain of left ankle [M25.572, G89.29]     * Peroneal tendon tear, left, subsequent encounter [S86.312D]    Procedure/CPT® Codes:      Procedure(s):  1. Brostrom-Carbajal Lateral Ankle Stabilization with Internal Brace Augmentation, Left  2. Peroneal Synovectomy, Left  3. Excision of Low Lying Muscle Belly of Peroneus Brevis Tendon, Left  4. Repair of Peroneal Brevis Tendon Tear, Left    Surgeon(s):  Ajay Comer DPM    Anesthesia: General with Popliteal Block    Staff:   Circulator: Nader Nobles RN  Scrub Person: Suhail Beltrán  Assistant: Jermaine Barber Tina M  [place]: Sundeep Carbajal RN    Estimated Blood Loss: minimal    Urine Voided: * No values recorded between 9/19/2018 12:12 PM and 9/19/2018  2:17 PM *    Specimens:                ID Type Source Tests Collected by Time   A : muscle tissue left foot Tissue Foot, Left TISSUE PATHOLOGY EXAM Ajay Comer DPM 9/19/2018 1254         Drains:  None     Findings: Consistent with pre-operative diagnoses. Chronic inflammation and synovitis to peroneal tendon sheaths. Peroneus Brevis muscle belly was low lying and extended beyond distal fibula.     Complications: None      Ajay Comer DPM     Date: 9/19/2018  Time: 2:26 PM

## 2018-09-19 NOTE — ANESTHESIA PROCEDURE NOTES
Airway  Urgency: elective    Airway not difficult    General Information and Staff    Patient location during procedure: OR  CRNA: JOVANY BOOTH    Indications and Patient Condition  Indications for airway management: airway protection    Preoxygenated: yes  MILS maintained throughout  Mask difficulty assessment: 1 - vent by mask    Final Airway Details  Final airway type: endotracheal airway      Successful airway: ETT  Cuffed: yes   Successful intubation technique: direct laryngoscopy  Facilitating devices/methods: intubating stylet  Endotracheal tube insertion site: oral  Blade: Carson  Blade size: 3.5.  ETT size: 7.5 mm  Cormack-Lehane Classification: grade I - full view of glottis  Placement verified by: chest auscultation and capnometry   Measured from: lips  ETT to lips (cm): 22

## 2018-09-19 NOTE — DISCHARGE INSTRUCTIONS
What to expect after a Nerve Block  Nerve blocks administered to block pain affect many types of nerves, including those nerves that control movement, pain, and normal sensation.  Following a nerve block, you may notice some bruising at the site where the block was given.  You may experience sensations such as:  numbness of the affected area or limb, tingling, heaviness (that is the limb feels heavy to you), weakness or inability to move the affected arm or leg, or a feeling as if your arm or leg has “fallen asleep”.    A nerve block can last from 9-18 hours depending on the medications used.  Usually the weakness wears off first followed by the tingling and heaviness.  As the block wears off, you may begin to notice pain; however, this sequence of events may occur in any order.  Typically, you will be able to move your limb before you will feel it.  Once a nerve block begins to wear off, the effects are usually completely gone within 60 minutes.    If you experience continued side effects that you believe are block related for longer than 48 hours, please call your healthcare provider.  Please see block-specific instructions below.    Instructions for any Block involving the leg/foot:  If you have had a leg/foot block, you should not bear weight on the affected leg until the block has worn off.  After the block has worn off, weight bearing should be as directed by your surgeon.  You may be sent home with crutches.  You are at high risk for falling because of the anesthetic effects on your leg.  Please use caution when standing or trying to move or walk.  Have someone assist you until your leg and foot function have returned to normal.    Protection of a “blocked” limb  • After a nerve block, you cannot feel pain, pressure, or extremes of temperature in the affected limb.  And because of this, your blocked limb is at more risk for injury.  For example, it is possible to burn your limb on an extremely hot surface  without feeling it.  • When resting, it is important to reposition your limb periodically to avoid prolonged pressure on it.  This may require the use of pillows and padding.  • While sleeping, you should avoid rolling onto the affected limb or putting too much pressure on it.  • If you have a cast or tight dressing, check the color of your toes of the affected limb.  Call your surgeon if they look discolored (that is, dusky, dark colored)  • Use caution in cold weather.  Cover your limb appropriately to protect it from the cold.  Pain Management  Your surgeon will give you a prescription for pain medication.  Begin taking this before the nerve block wears off.  Bear in mind that sometimes the block can wear off in the middle of the night.      YOUR NEXT PAIN MEDICATION IS DUE AT______________         General Anesthesia, Adult, Care After  Refer to this sheet in the next few weeks. These instructions provide you with information on caring for yourself after your procedure. Your health care provider may also give you more specific instructions. Your treatment has been planned according to current medical practices, but problems sometimes occur. Call your health care provider if you have any problems or questions after your procedure.  WHAT TO EXPECT AFTER THE PROCEDURE  After the procedure, it is typical to experience:  · Sleepiness.  · Nausea and vomiting.  HOME CARE INSTRUCTIONS  · For the first 24 hours after general anesthesia:  ¨ Have a responsible person with you.  ¨ Do not drive a car. If you are alone, do not take public transportation.  ¨ Do not drink alcohol.  ¨ Do not take medicine that has not been prescribed by your health care provider.  ¨ Do not sign important papers or make important decisions.  ¨ You may resume a normal diet and activities as directed by your health care provider.  · Change bandages (dressings) as directed.  · If you have questions or problems that seem related to general anesthesia,  call the hospital and ask for the anesthetist or anesthesiologist on call.  SEEK MEDICAL CARE IF:  · You have nausea and vomiting that continue the day after anesthesia.  · You develop a rash.  SEEK IMMEDIATE MEDICAL CARE IF:    · You have difficulty breathing.  · You have chest pain.  · You have any allergic problems.     This information is not intended to replace advice given to you by your health care provider. Make sure you discuss any questions you have with your health care provider.     Document Released: 03/26/2002 Document Revised: 01/08/2016 Document Reviewed: 07/03/2014  [employer] Interactive Patient Education ©2016 [employer]    CALL YOUR PHYSICIAN IF YOU EXPERIENCE  INCREASED PAIN NOT HELPED BY YOUR PAIN MEDICATION.    .                                              Fall Prevention in the Home      Falls can cause injuries. They can happen to people of all ages. There are many things you can do to make your home safe and to help prevent falls.    WHAT CAN I DO ON THE OUTSIDE OF MY HOME?  · Regularly fix the edges of walkways and driveways and fix any cracks.  · Remove anything that might make you trip as you walk through a door, such as a raised step or threshold.  · Trim any bushes or trees on the path to your home.  · Use bright outdoor lighting.  · Clear any walking paths of anything that might make someone trip, such as rocks or tools.  · Regularly check to see if handrails are loose or broken. Make sure that both sides of any steps have handrails.  · Any raised decks and porches should have guardrails on the edges.  · Have any leaves, snow, or ice cleared regularly.  · Use sand or salt on walking paths during winter.  · Clean up any spills in your garage right away. This includes oil or grease spills.  WHAT CAN I DO IN THE BATHROOM?    · Use night lights.  · Install grab bars by the toilet and in the tub and shower. Do not use towel bars as grab bars.  · Use non-skid mats or decals in the tub or  shower.  · If you need to sit down in the shower, use a plastic, non-slip stool.  · Keep the floor dry. Clean up any water that spills on the floor as soon as it happens.  · Remove soap buildup in the tub or shower regularly.  · Attach bath mats securely with double-sided non-slip rug tape.  · Do not have throw rugs and other things on the floor that can make you trip.  WHAT CAN I DO IN THE BEDROOM?  · Use night lights.  · Make sure that you have a light by your bed that is easy to reach.  · Do not use any sheets or blankets that are too big for your bed. They should not hang down onto the floor.  · Have a firm chair that has side arms. You can use this for support while you get dressed.  · Do not have throw rugs and other things on the floor that can make you trip.  WHAT CAN I DO IN THE KITCHEN?  · Clean up any spills right away.  · Avoid walking on wet floors.  · Keep items that you use a lot in easy-to-reach places.  · If you need to reach something above you, use a strong step stool that has a grab bar.  · Keep electrical cords out of the way.  · Do not use floor polish or wax that makes floors slippery. If you must use wax, use non-skid floor wax.  · Do not have throw rugs and other things on the floor that can make you trip.  WHAT CAN I DO WITH MY STAIRS?  · Do not leave any items on the stairs.  · Make sure that there are handrails on both sides of the stairs and use them. Fix handrails that are broken or loose. Make sure that handrails are as long as the stairways.  · Check any carpeting to make sure that it is firmly attached to the stairs. Fix any carpet that is loose or worn.  · Avoid having throw rugs at the top or bottom of the stairs. If you do have throw rugs, attach them to the floor with carpet tape.  · Make sure that you have a light switch at the top of the stairs and the bottom of the stairs. If you do not have them, ask someone to add them for you.  WHAT ELSE CAN I DO TO HELP PREVENT  FALLS?  · Wear shoes that:  ¨ Do not have high heels.  ¨ Have rubber bottoms.  ¨ Are comfortable and fit you well.  ¨ Are closed at the toe. Do not wear sandals.  · If you use a stepladder:  ¨ Make sure that it is fully opened. Do not climb a closed stepladder.  ¨ Make sure that both sides of the stepladder are locked into place.  ¨ Ask someone to hold it for you, if possible.  · Clearly codie and make sure that you can see:  ¨ Any grab bars or handrails.  ¨ First and last steps.  ¨ Where the edge of each step is.  · Use tools that help you move around (mobility aids) if they are needed. These include:  ¨ Canes.  ¨ Walkers.  ¨ Scooters.  ¨ Crutches.  · Turn on the lights when you go into a dark area. Replace any light bulbs as soon as they burn out.  · Set up your furniture so you have a clear path. Avoid moving your furniture around.  · If any of your floors are uneven, fix them.  · If there are any pets around you, be aware of where they are.  · Review your medicines with your doctor. Some medicines can make you feel dizzy. This can increase your chance of falling.  Ask your doctor what other things that you can do to help prevent falls.     This information is not intended to replace advice given to you by your health care provider. Make sure you discuss any questions you have with your health care provider.     Document Released: 10/14/2010 Document Revised: 05/03/2016 Document Reviewed: 01/22/2016  [employer] Interactive Patient Education ©2016 [employer]     PATIENT/FAMILY/RESPONSIBLE PARTY VERBALIZES UNDERSTANDING OF ABOVE EDUCATION.  COPY OF PAIN SCALE GIVEN AND REVIEWED WITH VERBALIZED UNDERSTANDING.

## 2018-09-19 NOTE — ANESTHESIA PROCEDURE NOTES
Peripheral Block      Patient location during procedure: holding area  Start time: 9/19/2018 12:04 PM  Stop time: 9/19/2018 12:06 PM  Reason for block: post-op pain management  Performed by  Anesthesiologist: RIK HASSAN  Preanesthetic Checklist  Completed: patient identified, site marked, surgical consent, pre-op evaluation, timeout performed, IV checked, risks and benefits discussed and monitors and equipment checked  Prep:  Pt Position: right lateral decubitus  Sterile barriers:gloves  Prep: ChloraPrep  Patient monitoring: blood pressure monitoring, continuous pulse oximetry and EKG  Procedure  Sedation:yes    Guidance:ultrasound guided and nerve stimulator  Images:still images obtained  Loss of twitch: 0.5 mA  Laterality:left  Block Type:popliteal  Injection Technique:single-shot  Needle Type:echogenic  Needle Gauge:20 G    Medications  Local Injected:ropivacaine 0.5% without epinephrine Local Amount Injected:20mL  Post Assessment  Injection Assessment: negative aspiration for heme, no paresthesia on injection and incremental injection  Patient Tolerance:comfortable throughout block  Complications:no

## 2018-09-19 NOTE — H&P (VIEW-ONLY)
[facility] - PODIATRY    Today's Date: 09/14/18    Patient Name: Ismael Huff  MRN: [id number]  CSN: [id number]  PCP: Edouard Madrigal DO  Referring Provider: No ref. provider found    SUBJECTIVE     Chief Complaint   Patient presents with   • Left Ankle - Follow-up     PT is here to discuss surgery. PT c/o swelling, cramping, and pain in left ankle. Pain scale: 7/10. PCP: Dr. Edouard Madrigal     HPI: Ismael Huff, a 53 y.o.male, comes to clinic as a(n) established patient complaining of left ankle pain. Patient has h/o arthritis, gout, kidney stones. Patient had OCD repair/debridement in November 2016.  Patient states that procedure with subsequent physical therapy was successful and had minimal to no complications with procedure.  Admits to being unable to afford [place] brace.  States that a few weeks ago, his left ankle started to become significantly more painful.  Denies single traumatic event.  He notes that his ankle will give out on him and twist.  Has increased pain with walking, especially on uneven surfaces. He admits to not wearing his CAM boot. Says he cancelled his previous surgical procedure due to thinking the pain would improve but it has not and he would like to now pursue the procedures. Denies change in presentation. Admits pain at 7/10 level and described as shooting, aching and throbbing. Right ankle has improved to be minimally painful. Denies any constitutional symptoms. No other pedal complaints at this time.    Past Medical History:   Diagnosis Date   • Ankle pain, left    • Arthritis    • Gout    • History of kidney stones    • History of pneumonia    • Tendonitis of ankle     left   • Wears dentures      Past Surgical History:   Procedure Laterality Date   • ANKLE SURGERY Left    • COLECTOMY PARTIAL / TOTAL  2011    polyps ruptured   • COLONOSCOPY     • KNEE SURGERY Left 1990s    torn cartilage repair   • LEG EXCISION LESION/CYST Left 11/22/2016    Procedure: ANKLE  LOOSE BODY EXCISION/REMOVAL, LEFT;  Surgeon: Ajay Comer DPM;  Location: [facility] OR;  Service:      Family History   Problem Relation Age of Onset   • Cancer Mother    • Diabetes Mother    • Heart disease Father    • Cancer Father    • Hypertension Father    • Arthritis Father    • Stroke Father    • Hyperlipidemia Father      Social History     Social History   • Marital status:      Spouse name: N/A   • Number of children: N/A   • Years of education: N/A     Occupational History   • Not on file.     Social History Main Topics   • Smoking status: Never Smoker   • Smokeless tobacco: Current User     Types: Snuff      Comment: ~40 YEARS   • Alcohol use No   • Drug use: No   • Sexual activity: Defer     Other Topics Concern   • Not on file     Social History Narrative   • No narrative on file     Allergies   Allergen Reactions   • Reglan [Metoclopramide] Anaphylaxis   • Allopurinol Rash   • Keflex [Cephalexin] Rash     No current outpatient prescriptions on file.     No current facility-administered medications for this visit.      Review of Systems   Constitutional: Negative for chills and fever.   HENT: Negative for congestion.    Respiratory: Negative for shortness of breath.    Cardiovascular: Negative for chest pain and leg swelling.   Gastrointestinal: Negative for constipation, diarrhea, nausea and vomiting.   Musculoskeletal:        Ankle pain   Skin: Negative for wound.   Neurological: Negative for numbness.       OBJECTIVE     Vitals:    09/14/18 1447   BP: 130/82   Pulse: 71   SpO2: 95%       PHYSICAL EXAM  GEN:   A&Ox3, NAD. Pt presents to clinic ambulating without assistance and wearing Casual Shoes. Accompanied by none.     Physical Exam   Constitutional: He is oriented to person, place, and time. He appears well-developed and well-nourished.   HENT:   Head: Normocephalic and atraumatic.   Eyes: Pupils are equal, round, and reactive to light. EOM are normal.   Neck: Normal range of motion.  Neck supple.   Cardiovascular: Normal rate, regular rhythm, normal heart sounds and intact distal pulses.    Pulmonary/Chest: Effort normal and breath sounds normal.   Abdominal: Soft. Bowel sounds are normal.   Neurological: He is alert and oriented to person, place, and time.   Skin: Skin is warm and dry.   Psychiatric: He has a normal mood and affect. His behavior is normal. Judgment and thought content normal.   Vitals reviewed.      NEURO:   Protective sensation intact to 10/10 sites Right foot, 10/10 site Left foot using Hagaman-Ashley monofilament  Light touch sensation present  No Tinel's or Villeux sign.    VASC:  Skin temperature Warm to Warm proximal to distal odilon  DP pulses 2/4 Right, 2/4 Left  PT pulses 2/4 Right, 2/4 Left  CFT 3 sec odilon  Pedal hair growth present  no edema noted odilon  Varicosities absent odilon    MUSC/SKEL:  Muscle Strength Right foot Dorsiflexors 5/5, Plantarflexors 5/5, Evertors 5/5, Invertors 5/5  Muscle Strength Left foot Dorsiflexors 5/5, Plantarflexors 5/5, Evertors guarded/5, Invertors 5/5  ROM of the 1st MTP is full without pain or crepitus  ROM of the MTJ is full without pain or crepitus    ROM of the STJ is full without pain or crepitus    ROM of the ankle joint is decreased on left, mostly due to guarding  Pain with talar tilt  POP of left lateral ankle along ATFL and to lesser degree CFL.   Pain to distal tip of fibula along peroneal tendons  Minimal POP of right lateral ankle along ATFL  Rectus foot type   Gait pattern: Normal  No gross pedal musculoskeletal deformities noted.     DERM:  Pedal nails x10 are within normal limits of length and thickness  Webspaces are Clean, Dry, and Intact  Skin is normal in turgor and texture with no open wounds or sores appreciated.  Previous incision site at anterior medial aspect of left ankle is fully coapted with no signs of hypertrophied scar      RADIOLOGY/NUCLEAR:  No results found.   IMPRESSION:  1. Findings suspicious for a  peroneus brevis tendon tear. Tendinopathy  of the peroneus longus tendon favored.   2. Questionable tenosynovitis involving the flexor hallux longus and  posterior tibialis tendons.  3. Osteochondral defect along the lateral talar dome.  4. Scattered edema involving the tarsal likely proximal metatarsal  bones. No discrete fracture.  5. Os trigonum with minimal edema seen adjacent to the accessory  ossicle.    LABORATORY/CULTURE RESULTS:      PATHOLOGY RESULTS:       ASSESSMENT/PLAN     Ismael was seen today for follow-up.    Diagnoses and all orders for this visit:    Chronic pain of left ankle  -     Case Request; Standing  -     Instructions on coughing, deep breathing, and incentive spirometry.; Future  -     CBC and Differential; Future  -     Comprehensive metabolic panel; Future  -     ECG 12 Lead; Future  -     Type and screen; Future  -     XR chest 2 vw; Future  -     clindamycin (CLEOCIN) 900 mg in dextrose (D5W) 5 % 100 mL IVPB; Infuse 900 mg into a venous catheter 1 (One) Time.  -     Case Request    Ankle instability, left  -     Case Request; Standing  -     Instructions on coughing, deep breathing, and incentive spirometry.; Future  -     CBC and Differential; Future  -     Comprehensive metabolic panel; Future  -     ECG 12 Lead; Future  -     Type and screen; Future  -     XR chest 2 vw; Future  -     clindamycin (CLEOCIN) 900 mg in dextrose (D5W) 5 % 100 mL IVPB; Infuse 900 mg into a venous catheter 1 (One) Time.  -     Case Request    Peroneal tendon tear, left, subsequent encounter  -     Case Request; Standing  -     Instructions on coughing, deep breathing, and incentive spirometry.; Future  -     CBC and Differential; Future  -     Comprehensive metabolic panel; Future  -     ECG 12 Lead; Future  -     Type and screen; Future  -     XR chest 2 vw; Future  -     clindamycin (CLEOCIN) 900 mg in dextrose (D5W) 5 % 100 mL IVPB; Infuse 900 mg into a venous catheter 1 (One) Time.  -     Case  Request    Other orders  -     Follow Anesthesia Guidelines / Standing Orders; Future  -     Obtain informed consent; Future  -     Follow Anesthesia Guidelines / Standing Orders; Standing  -     Provide instructions to patient regarding NPO status; Future  -     Clorhexidine skin prep; Future  -     Verify NPO Status; Standing  -     Obtain informed consent (if not collected inpatient or PAT); Standing  -     Nerve Block; Standing  -     Instructions on coughing, deep breathing, and incentive spirometry.; Standing  -     Notify Physician - Standard; Standing  -     Radiology Technician to Be Present for Intra-Op C-Arm Use; Standing  -     NPO After Midnight      Comprehensive lower extremity examination and evaluation was performed.  Discussed findings and treatment plan including risks, benefits, and treatment options with patient in detail. Patient agreed with treatment plan.  Patient given options of continued conservative therapy with addition of physical therapy or surgical intervention to correct issues. Patient opts for surgical intervention.   Discussed all options, benefits and risks associated with surgery including but not limited to: pain, bleeding, infection, wound, difficulty walking, amputation. Patient wishes to proceed.   Patient will be scheduled for LEFT Lateral ankle stabilization with Arthrex Internal Brace Augmentation and Peroneal tendon repair.   An After Visit Summary was printed and given to the patient at discharge, including (if requested) any available informative/educational handouts regarding diagnosis, treatment, or medications. All questions were answered to patient/family satisfaction. Should symptoms fail to improve or worsen they agree to call or return to clinic or to go to the Emergency Department. Discussed the importance of following up with any needed screening tests/labs/specialist appointments and any requested follow-up recommended by me today. Importance of maintaining  follow-up discussed and patient accepts that missed appointments can delay diagnosis and potentially lead to worsening of conditions.  Return for Post-Op appointment., or sooner if acute issues arise.

## 2018-09-20 ENCOUNTER — TELEPHONE (OUTPATIENT)
Dept: PODIATRY | Facility: CLINIC | Age: 53
End: 2018-09-20

## 2018-09-20 NOTE — PROGRESS NOTES
[facility] - PODIATRY    Today's Date: 09/26/18    Patient Name: Ismael Huff  MRN: [id number]  CSN: [id number]  PCP: Edouard Madrigal DO  Referring Provider: No ref. provider found    SUBJECTIVE     Chief Complaint   Patient presents with   • Right Ankle - Follow-up     PT is here for 1 week post op visit. PT states he has not had much pain since the day after surgery. Denies pain at present time. PCP: Edouard Madrigal      HPI: Ismael Huff, a 53 y.o.male, comes to clinic as a(n) established patient for post-op appt 1 weeks s/p left lateral ankle stabilization and peroneal tendon repair. Patient has h/o arthritis, gout, kidney stones. Relates that he has been compliant with his post-op course. Has been NWB with use of knee scooter. Has left the bandage intact and dry.  Denies pain. Denies strikethrough. Denies any constitutional symptoms. No other pedal complaints at this time.    Past Medical History:   Diagnosis Date   • Ankle pain, left    • Arthritis    • Gout    • History of kidney stones    • History of pneumonia    • Tendonitis of ankle     left   • Wears dentures      Past Surgical History:   Procedure Laterality Date   • ANKLE LIGAMENT RECONSTRUCTION Left 9/19/2018    Procedure: Lateral Ankle Stabilization, Peroneal Tendon Repair - Left;  Surgeon: Ajay Comer DPM;  Location:  [facility] OR;  Service: Podiatry   • ANKLE SURGERY Left    • COLECTOMY PARTIAL / TOTAL  2011    polyps ruptured   • COLONOSCOPY     • KNEE SURGERY Left 1990s    torn cartilage repair   • LEG EXCISION LESION/CYST Left 11/22/2016    Procedure: ANKLE LOOSE BODY EXCISION/REMOVAL, LEFT;  Surgeon: Ajay Coemr DPM;  Location:  [facility] OR;  Service:      Family History   Problem Relation Age of Onset   • Cancer Mother    • Diabetes Mother    • Heart disease Father    • Cancer Father    • Hypertension Father    • Arthritis Father    • Stroke Father    • Hyperlipidemia Father      Social History     Social History    • Marital status:      Spouse name: N/A   • Number of children: N/A   • Years of education: N/A     Occupational History   • Not on file.     Social History Main Topics   • Smoking status: Never Smoker   • Smokeless tobacco: Current User     Types: Snuff      Comment: ~40 YEARS   • Alcohol use No   • Drug use: No   • Sexual activity: Defer     Other Topics Concern   • Not on file     Social History Narrative   • No narrative on file     Allergies   Allergen Reactions   • Reglan [Metoclopramide] Anaphylaxis   • Allopurinol Rash   • Keflex [Cephalexin] Rash     Current Outpatient Prescriptions   Medication Sig Dispense Refill   • aspirin  MG tablet Take 1 tablet by mouth Daily for 30 days. 30 tablet 0   • oxyCODONE-acetaminophen (PERCOCET)  MG per tablet Take 1 tablet by mouth Every 6 (Six) Hours As Needed for Moderate Pain  (Pain). 28 tablet 0   • docusate sodium (COLACE) 100 MG capsule Take 1 capsule by mouth Daily. 7 capsule 0   • promethazine (PHENERGAN) 12.5 MG tablet Take 1 tablet by mouth Every 8 (Eight) Hours As Needed for Nausea or Vomiting. 21 tablet 0     No current facility-administered medications for this visit.      Review of Systems   Constitutional: Negative for chills and fever.   HENT: Negative for congestion.    Respiratory: Negative for shortness of breath.    Cardiovascular: Negative for chest pain and leg swelling.   Gastrointestinal: Negative for constipation, diarrhea, nausea and vomiting.   Musculoskeletal:        Ankle pain   Skin: Negative for wound.   Neurological: Negative for numbness.       OBJECTIVE     Vitals:    09/26/18 0806   BP: 136/72   Pulse: 50   SpO2: 97%       PHYSICAL EXAM  GEN:   A&Ox3, NAD. Pt presents to clinic with knee scooter and wearing posterior splint. Accompanied by wife.     Physical Exam    NEURO:   Protective sensation intact to 10/10 sites Right foot, 10/10 site Left foot using [place] monofilament  Light touch sensation  present  No Tinel's or Villeux sign.    VASC:  Skin temperature Warm to Warm proximal to distal odilon  DP pulses 2/4 Right, 2/4 Left  PT pulses 2/4 Right, 2/4 Left  CFT 3 sec odilon  Pedal hair growth present  no edema noted odilon  Varicosities absent odilon    MUSC/SKEL:  Minimal pain to surgical sites.  Ankle appears stable.  Able to hien foot     DERM:  Pedal nails x10 are within normal limits of length and thickness  Webspaces are Clean, Dry, and Intact  Skin is normal in turgor and texture with no open wounds or sores appreciated.  Previous incision site at anterior medial aspect of left ankle is fully coapted with no signs of hypertrophied scar  Incisions noted to left lateral ankle. Sutures intact. Healing well. No signs of dehiscence or infection.        RADIOLOGY/NUCLEAR:  Xr Chest 2 Vw    Result Date: 9/18/2018  Narrative: EXAMINATION: XR CHEST 2 VW- 9/18/2018 1:04 PM CDT  HISTORY: pre-op; M25.572-Pain in left ankle and joints of left foot; G89.29-Other chronic pain; M25.372-Other instability, left ankle; S86.312D-Strain of muscle(s) and tendon(s) of peroneal muscle group at lower leg level, left leg, subsequent encounter.  REPORT: Comparison is made with the study from 11/15/2016.   Frontal and lateral views of the chest were obtained. The lungs are clear and normally expanded. The heart and mediastinum appear normal. The bony thorax and upper abdomen are unremarkable.      Impression:  Normal 2 view chest examination.     This report was finalized on 09/18/2018 13:05 by Dr. Attila Mary MD.    Xr Ankle 2 View Left    Result Date: 9/19/2018  Narrative: EXAMINATION: XR ANKLE 2 VW LEFT- 9/19/2018 2:21 PM CDT  HISTORY: Tendon repair; M25.572-Pain in left ankle and joints of left foot; G89.29-Other chronic pain; M25.372-Other instability, left ankle; S86.312D-Strain of muscle(s) and tendon(s) of peroneal muscle group at lower leg level, left leg, subsequent encounter.  Fluoroscopy time: 5 seconds.  REPORT: 3  intraoperative fluoroscopic spot views were obtained demonstrating a probe and surgical exposure at the lateral ankle, reportedly for tendon repair. The osseous structures appear unremarkable except for inferior calcaneal spurring. Images are stored in PACS for review. This report was finalized on 09/19/2018 14:22 by Dr. Attila Mary MD.    Fl C Arm During Surgery    Result Date: 9/19/2018  Narrative: EXAMINATION: XR ANKLE 2 VW LEFT- 9/19/2018 2:21 PM CDT  HISTORY: Tendon repair; M25.572-Pain in left ankle and joints of left foot; G89.29-Other chronic pain; M25.372-Other instability, left ankle; S86.312D-Strain of muscle(s) and tendon(s) of peroneal muscle group at lower leg level, left leg, subsequent encounter.  Fluoroscopy time: 5 seconds.  REPORT: 3 intraoperative fluoroscopic spot views were obtained demonstrating a probe and surgical exposure at the lateral ankle, reportedly for tendon repair. The osseous structures appear unremarkable except for inferior calcaneal spurring. Images are stored in PACS for review. This report was finalized on 09/19/2018 14:22 by Dr. Attila Mary MD.     IMPRESSION:  1. Findings suspicious for a peroneus brevis tendon tear. Tendinopathy  of the peroneus longus tendon favored.   2. Questionable tenosynovitis involving the flexor hallux longus and  posterior tibialis tendons.  3. Osteochondral defect along the lateral talar dome.  4. Scattered edema involving the tarsal likely proximal metatarsal  bones. No discrete fracture.  5. Os trigonum with minimal edema seen adjacent to the accessory  ossicle.    LABORATORY/CULTURE RESULTS:      PATHOLOGY RESULTS:       ASSESSMENT/PLAN     Ismael was seen today for follow-up.    Diagnoses and all orders for this visit:    S/P foot surgery      Comprehensive lower extremity examination and evaluation was performed.  Discussed findings and treatment plan including risks, benefits, and treatment options with patient in detail. Patient  agreed with treatment plan.  Bandage removed and surgical site evaluated. Healing well. Reapplied similar bandage.   Plan to remove suture ends at next visit.  To bring CAM boot.  Remain NWB  An After Visit Summary was printed and given to the patient at discharge, including (if requested) any available informative/educational handouts regarding diagnosis, treatment, or medications. All questions were answered to patient/family satisfaction. Should symptoms fail to improve or worsen they agree to call or return to clinic or to go to the Emergency Department. Discussed the importance of following up with any needed screening tests/labs/specialist appointments and any requested follow-up recommended by me today. Importance of maintaining follow-up discussed and patient accepts that missed appointments can delay diagnosis and potentially lead to worsening of conditions.  Return in about 2 weeks (around 10/10/2018)., or sooner if acute issues arise.    Lab Frequency Next Occurrence   Follow Anesthesia Guidelines / Standing Orders Once 11/09/2016   XR Ankle 3+ View Left Once 04/01/2018   XR Foot 3+ View Left Once 04/01/2018       This document has been electronically signed by Ajay Comer DPM on September 26, 2018 8:28 AM

## 2018-09-20 NOTE — TELEPHONE ENCOUNTER
Called and checked on patient one day after surgical procedure with Dr. Davis Comer. Patient states he is doing very well at the present time and has only taken one pain pill since being home. Patient admits to moving around a little today and feeling good. A knee scooter was mentioned and I let him know that [facility] allows them to be rented. Patient states he might swing by office tomorrow to  short-term disability paperwork and if not, he'll pick it up on the day of his appointment next week on 09/26/2018 at 8:00 am.

## 2018-09-21 LAB
CYTO UR: NORMAL
LAB AP CASE REPORT: NORMAL
PATH REPORT.FINAL DX SPEC: NORMAL
PATH REPORT.GROSS SPEC: NORMAL

## 2018-09-25 ENCOUNTER — TELEPHONE (OUTPATIENT)
Dept: PODIATRY | Facility: CLINIC | Age: 53
End: 2018-09-25

## 2018-09-25 NOTE — TELEPHONE ENCOUNTER
Patient returned my phone call. Reminded patient of appointment with Dr. Davis Comer at 8:00 am, tomorrow morning. Patient gave verbal confirmation at this time.

## 2018-09-25 NOTE — TELEPHONE ENCOUNTER
Attempted to leave voicemail and callback number reminding patient of appointment tomorrow morning with Dr. Davis Comer at 8:00 am. Patient does not have voicemail set up at this time.

## 2018-09-26 ENCOUNTER — OFFICE VISIT (OUTPATIENT)
Dept: PODIATRY | Facility: CLINIC | Age: 53
End: 2018-09-26

## 2018-09-26 ENCOUNTER — TELEPHONE (OUTPATIENT)
Dept: PODIATRY | Facility: CLINIC | Age: 53
End: 2018-09-26

## 2018-09-26 VITALS
HEART RATE: 50 BPM | BODY MASS INDEX: 29.92 KG/M2 | HEIGHT: 70 IN | DIASTOLIC BLOOD PRESSURE: 72 MMHG | WEIGHT: 209 LBS | SYSTOLIC BLOOD PRESSURE: 136 MMHG | OXYGEN SATURATION: 97 %

## 2018-09-26 DIAGNOSIS — Z98.890 S/P FOOT SURGERY: Primary | ICD-10-CM

## 2018-09-26 PROCEDURE — 99024 POSTOP FOLLOW-UP VISIT: CPT | Performed by: PODIATRIST

## 2018-10-04 NOTE — PROGRESS NOTES
[facility] - PODIATRY    Today's Date: 10/10/18    Patient Name: Ismael Huff  MRN: [id number]  CSN: [id number]  PCP: Edouard Madrigal DO  Referring Provider: No ref. provider found    SUBJECTIVE     Chief Complaint   Patient presents with   • Left Ankle - Follow-up     PT c/o occasional pain in left ankle. Denies swelling at present time. Admits to falling last week. Pain scale: 7/10 when pain occurs. PCP: Dr. Edouard Madrigal      HPI: Ismael Huff, a 53 y.o.male, comes to clinic as a(n) established patient for post-op appt 3 weeks s/p left lateral ankle stabilization and peroneal tendon repair. Patient has h/o arthritis, gout, kidney stones. Relates that he has been compliant with his post-op course. Has been NWB with use of knee scooter. Has left the bandage intact and dry. Admits to getting it dirty on a riding .  Admits pain at 7/10 level and described as aching. Denies strikethrough. Denies any constitutional symptoms. No other pedal complaints at this time.    Past Medical History:   Diagnosis Date   • Ankle pain, left    • Arthritis    • Gout    • History of kidney stones    • History of pneumonia    • Tendonitis of ankle     left   • Wears dentures      Past Surgical History:   Procedure Laterality Date   • ANKLE LIGAMENT RECONSTRUCTION Left 9/19/2018    Procedure: Lateral Ankle Stabilization, Peroneal Tendon Repair - Left;  Surgeon: Ajay Comer DPM;  Location: [facility] OR;  Service: Podiatry   • ANKLE SURGERY Left    • COLECTOMY PARTIAL / TOTAL  2011    polyps ruptured   • COLONOSCOPY     • KNEE SURGERY Left 1990s    torn cartilage repair   • LEG EXCISION LESION/CYST Left 11/22/2016    Procedure: ANKLE LOOSE BODY EXCISION/REMOVAL, LEFT;  Surgeon: Ajay Comer DPM;  Location:  [facility] OR;  Service:      Family History   Problem Relation Age of Onset   • Cancer Mother    • Diabetes Mother    • Heart disease Father    • Cancer Father    • Hypertension Father    •  Arthritis Father    • Stroke Father    • Hyperlipidemia Father      Social History     Social History   • Marital status:      Spouse name: N/A   • Number of children: N/A   • Years of education: N/A     Occupational History   • Not on file.     Social History Main Topics   • Smoking status: Never Smoker   • Smokeless tobacco: Current User     Types: Snuff      Comment: ~40 YEARS   • Alcohol use No   • Drug use: No   • Sexual activity: Defer     Other Topics Concern   • Not on file     Social History Narrative   • No narrative on file     Allergies   Allergen Reactions   • Reglan [Metoclopramide] Anaphylaxis   • Allopurinol Rash   • Keflex [Cephalexin] Rash     Current Outpatient Prescriptions   Medication Sig Dispense Refill   • aspirin  MG tablet Take 1 tablet by mouth Daily for 30 days. 30 tablet 0   • oxyCODONE-acetaminophen (PERCOCET)  MG per tablet Take 1 tablet by mouth Every 6 (Six) Hours As Needed for Moderate Pain  (Pain). 28 tablet 0   • docusate sodium (COLACE) 100 MG capsule Take 1 capsule by mouth Daily. 7 capsule 0   • promethazine (PHENERGAN) 12.5 MG tablet Take 1 tablet by mouth Every 8 (Eight) Hours As Needed for Nausea or Vomiting. 21 tablet 0     No current facility-administered medications for this visit.      Review of Systems   Constitutional: Negative for chills and fever.   HENT: Negative for congestion.    Respiratory: Negative for shortness of breath.    Cardiovascular: Negative for chest pain and leg swelling.   Gastrointestinal: Negative for constipation, diarrhea, nausea and vomiting.   Musculoskeletal:        Ankle pain   Skin: Negative for wound.   Neurological: Negative for numbness.       OBJECTIVE     Vitals:    10/10/18 0936   BP: 136/84   Pulse: 61   SpO2: 95%       PHYSICAL EXAM  GEN:   A&Ox3, NAD. Pt presents to clinic with knee scooter and wearing posterior splint. Accompanied by wife.     Physical Exam    NEURO:   Protective sensation intact to 10/10 sites  Right foot, 10/10 site Left foot using [place] monofilament  Light touch sensation present  No Tinel's or Villeux sign.    VASC:  Skin temperature Warm to Warm proximal to distal odilon  DP pulses 2/4 Right, 2/4 Left  PT pulses 2/4 Right, 2/4 Left  CFT 3 sec odilon  Pedal hair growth present  no edema noted odilon  Varicosities absent odilon    MUSC/SKEL:  Minimal pain to surgical sites.  Ankle appears stable.  Able to hien foot     DERM:  Pedal nails x10 are within normal limits of length and thickness  Webspaces are Clean, Dry, and Intact  Skin is normal in turgor and texture with no open wounds or sores appreciated.  Previous incision site at anterior medial aspect of left ankle is fully coapted with no signs of hypertrophied scar  Incisions noted to left lateral ankle. Sutures intact. Healing well. No signs of dehiscence or infection.        RADIOLOGY/NUCLEAR:  Xr Chest 2 Vw    Result Date: 9/18/2018  Narrative: EXAMINATION: XR CHEST 2 VW- 9/18/2018 1:04 PM CDT  HISTORY: pre-op; M25.572-Pain in left ankle and joints of left foot; G89.29-Other chronic pain; M25.372-Other instability, left ankle; S86.312D-Strain of muscle(s) and tendon(s) of peroneal muscle group at lower leg level, left leg, subsequent encounter.  REPORT: Comparison is made with the study from 11/15/2016.   Frontal and lateral views of the chest were obtained. The lungs are clear and normally expanded. The heart and mediastinum appear normal. The bony thorax and upper abdomen are unremarkable.      Impression:  Normal 2 view chest examination.     This report was finalized on 09/18/2018 13:05 by Dr. Attila Mary MD.    Xr Ankle 2 View Left    Result Date: 9/19/2018  Narrative: EXAMINATION: XR ANKLE 2 VW LEFT- 9/19/2018 2:21 PM CDT  HISTORY: Tendon repair; M25.572-Pain in left ankle and joints of left foot; G89.29-Other chronic pain; M25.372-Other instability, left ankle; S86.312D-Strain of muscle(s) and tendon(s) of peroneal muscle group at  lower leg level, left leg, subsequent encounter.  Fluoroscopy time: 5 seconds.  REPORT: 3 intraoperative fluoroscopic spot views were obtained demonstrating a probe and surgical exposure at the lateral ankle, reportedly for tendon repair. The osseous structures appear unremarkable except for inferior calcaneal spurring. Images are stored in PACS for review. This report was finalized on 09/19/2018 14:22 by Dr. Attila Mary MD.    Fl C Arm During Surgery    Result Date: 9/19/2018  Narrative: EXAMINATION: XR ANKLE 2 VW LEFT- 9/19/2018 2:21 PM CDT  HISTORY: Tendon repair; M25.572-Pain in left ankle and joints of left foot; G89.29-Other chronic pain; M25.372-Other instability, left ankle; S86.312D-Strain of muscle(s) and tendon(s) of peroneal muscle group at lower leg level, left leg, subsequent encounter.  Fluoroscopy time: 5 seconds.  REPORT: 3 intraoperative fluoroscopic spot views were obtained demonstrating a probe and surgical exposure at the lateral ankle, reportedly for tendon repair. The osseous structures appear unremarkable except for inferior calcaneal spurring. Images are stored in PACS for review. This report was finalized on 09/19/2018 14:22 by Dr. Attila Mary MD.     IMPRESSION:  1. Findings suspicious for a peroneus brevis tendon tear. Tendinopathy  of the peroneus longus tendon favored.   2. Questionable tenosynovitis involving the flexor hallux longus and  posterior tibialis tendons.  3. Osteochondral defect along the lateral talar dome.  4. Scattered edema involving the tarsal likely proximal metatarsal  bones. No discrete fracture.  5. Os trigonum with minimal edema seen adjacent to the accessory  ossicle.    LABORATORY/CULTURE RESULTS:      PATHOLOGY RESULTS:       ASSESSMENT/PLAN     Ismael was seen today for follow-up.    Diagnoses and all orders for this visit:    S/P foot surgery      Comprehensive lower extremity examination and evaluation was performed.  Discussed findings and  treatment plan including risks, benefits, and treatment options with patient in detail. Patient agreed with treatment plan.  Bandage removed and surgical site evaluated. Healing well. Cut suture endings.    Applied compressigrip.   To wear CAM NWB for 1 week, then may apply 10-20% weight for remaining 2 weeks until f/u.   To do passive ROM exercises daily.  An After Visit Summary was printed and given to the patient at discharge, including (if requested) any available informative/educational handouts regarding diagnosis, treatment, or medications. All questions were answered to patient/family satisfaction. Should symptoms fail to improve or worsen they agree to call or return to clinic or to go to the Emergency Department. Discussed the importance of following up with any needed screening tests/labs/specialist appointments and any requested follow-up recommended by me today. Importance of maintaining follow-up discussed and patient accepts that missed appointments can delay diagnosis and potentially lead to worsening of conditions.  Return in about 3 weeks (around 10/31/2018)., or sooner if acute issues arise.    Lab Frequency Next Occurrence   Follow Anesthesia Guidelines / Standing Orders Once 11/09/2016   XR Ankle 3+ View Left Once 04/01/2018   XR Foot 3+ View Left Once 04/01/2018       This document has been electronically signed by Ajay Comer DPM on October 10, 2018 9:51 AM

## 2018-10-09 ENCOUNTER — TELEPHONE (OUTPATIENT)
Dept: PODIATRY | Facility: CLINIC | Age: 53
End: 2018-10-09

## 2018-10-09 NOTE — TELEPHONE ENCOUNTER
Called and reminded patient of appointment with Dr. Davis Comer on October 10, 2018 at 9:30 am. Patient gave verbal confirmation of appointment at this time.

## 2018-10-10 ENCOUNTER — OFFICE VISIT (OUTPATIENT)
Dept: PODIATRY | Facility: CLINIC | Age: 53
End: 2018-10-10

## 2018-10-10 VITALS
DIASTOLIC BLOOD PRESSURE: 84 MMHG | SYSTOLIC BLOOD PRESSURE: 136 MMHG | HEART RATE: 61 BPM | WEIGHT: 209 LBS | BODY MASS INDEX: 29.92 KG/M2 | OXYGEN SATURATION: 95 % | HEIGHT: 70 IN

## 2018-10-10 DIAGNOSIS — Z98.890 S/P FOOT SURGERY: Primary | ICD-10-CM

## 2018-10-10 PROCEDURE — 99024 POSTOP FOLLOW-UP VISIT: CPT | Performed by: PODIATRIST

## 2018-10-16 ENCOUNTER — TELEPHONE (OUTPATIENT)
Dept: PODIATRY | Facility: CLINIC | Age: 53
End: 2018-10-16

## 2018-10-16 NOTE — TELEPHONE ENCOUNTER
Notified patient of upcoming appointment with Dr. Davis Comer on October 31, 2018 at 10:00 am. Patient gave verbal understanding of appointment and reminder letter will be mailed.

## 2018-10-30 ENCOUNTER — TELEPHONE (OUTPATIENT)
Dept: PODIATRY | Facility: CLINIC | Age: 53
End: 2018-10-30

## 2018-10-30 DIAGNOSIS — Z98.890 S/P FOOT SURGERY: Primary | ICD-10-CM

## 2018-10-30 NOTE — TELEPHONE ENCOUNTER
Called and reminded patient of appointment with Dr. Davis Comer on October 31, 2018 at 10:00 am. Patient gave verbal confirmation of appointment at this time.

## 2018-10-31 ENCOUNTER — OFFICE VISIT (OUTPATIENT)
Dept: PODIATRY | Facility: CLINIC | Age: 53
End: 2018-10-31

## 2018-10-31 ENCOUNTER — HOSPITAL ENCOUNTER (OUTPATIENT)
Dept: GENERAL RADIOLOGY | Facility: HOSPITAL | Age: 53
Discharge: HOME | End: 2018-10-31
Attending: PODIATRIST | Admitting: PODIATRIST

## 2018-10-31 VITALS
SYSTOLIC BLOOD PRESSURE: 128 MMHG | OXYGEN SATURATION: 97 % | DIASTOLIC BLOOD PRESSURE: 70 MMHG | HEIGHT: 70 IN | HEART RATE: 77 BPM | BODY MASS INDEX: 29.92 KG/M2 | WEIGHT: 209 LBS

## 2018-10-31 DIAGNOSIS — Z98.890 S/P FOOT SURGERY: ICD-10-CM

## 2018-10-31 DIAGNOSIS — Z98.890 S/P FOOT SURGERY: Primary | ICD-10-CM

## 2018-10-31 PROCEDURE — 73600 X-RAY EXAM OF ANKLE: CPT

## 2018-10-31 PROCEDURE — 99024 POSTOP FOLLOW-UP VISIT: CPT | Performed by: PODIATRIST

## 2018-10-31 NOTE — PROGRESS NOTES
[facility] - PODIATRY    Today's Date: 10/31/18    Patient Name: Ismael Huff  MRN: [id number]  CSN: [id number]  PCP: Edouard Madrigal DO  Referring Provider: No ref. provider found    SUBJECTIVE     Chief Complaint   Patient presents with   • Left Ankle - Follow-up     PT c/o soreness, tenderness, and occasional pain in left ankle/tendon. Pain scale: 6/10. PCP: Dr. Edouard Madrigal     HPI: Ismael Huff, a 53 y.o.male, comes to clinic as a(n) established patient for post-op appt 6 weeks s/p left lateral ankle stabilization and peroneal tendon repair. Patient has h/o arthritis, gout, kidney stones. States that he has returned to regular shoegear and has been FWB. Admits to mild pain along the outside of his ankle along the tendon. Pain is intermittent.  Admits pain at 6/10 level and described as aching. Notes incisions have remained healed. Denies any constitutional symptoms. No other pedal complaints at this time.    Past Medical History:   Diagnosis Date   • Ankle pain, left    • Arthritis    • Gout    • History of kidney stones    • History of pneumonia    • Tendonitis of ankle     left   • Wears dentures      Past Surgical History:   Procedure Laterality Date   • ANKLE LIGAMENT RECONSTRUCTION Left 9/19/2018    Procedure: Lateral Ankle Stabilization, Peroneal Tendon Repair - Left;  Surgeon: Ajay Comer DPM;  Location: [facility] OR;  Service: Podiatry   • ANKLE SURGERY Left    • COLECTOMY PARTIAL / TOTAL  2011    polyps ruptured   • COLONOSCOPY     • KNEE SURGERY Left 1990s    torn cartilage repair   • LEG EXCISION LESION/CYST Left 11/22/2016    Procedure: ANKLE LOOSE BODY EXCISION/REMOVAL, LEFT;  Surgeon: Ajay Comer DPM;  Location:  [facility] OR;  Service:      Family History   Problem Relation Age of Onset   • Cancer Mother    • Diabetes Mother    • Heart disease Father    • Cancer Father    • Hypertension Father    • Arthritis Father    • Stroke Father    • Hyperlipidemia Father       Social History     Social History   • Marital status:      Spouse name: N/A   • Number of children: N/A   • Years of education: N/A     Occupational History   • Not on file.     Social History Main Topics   • Smoking status: Never Smoker   • Smokeless tobacco: Current User     Types: Snuff      Comment: ~40 YEARS   • Alcohol use No   • Drug use: No   • Sexual activity: Defer     Other Topics Concern   • Not on file     Social History Narrative   • No narrative on file     Allergies   Allergen Reactions   • Reglan [Metoclopramide] Anaphylaxis   • Allopurinol Rash   • Keflex [Cephalexin] Rash     Current Outpatient Prescriptions   Medication Sig Dispense Refill   • oxyCODONE-acetaminophen (PERCOCET)  MG per tablet Take 1 tablet by mouth Every 6 (Six) Hours As Needed for Moderate Pain  (Pain). 28 tablet 0     No current facility-administered medications for this visit.      Review of Systems   Constitutional: Negative for chills and fever.   HENT: Negative for congestion.    Respiratory: Negative for shortness of breath.    Cardiovascular: Negative for chest pain and leg swelling.   Gastrointestinal: Negative for constipation, diarrhea, nausea and vomiting.   Musculoskeletal:        Ankle pain   Skin: Negative for wound.   Neurological: Negative for numbness.       OBJECTIVE     Vitals:    10/31/18 0919   BP: 128/70   Pulse: 77   SpO2: 97%       PHYSICAL EXAM  GEN:   A&Ox3, NAD. Pt presents to clinic ambulating without assistance and wearing Casual Shoes. Accompanied by wife.     Physical Exam    NEURO:   Protective sensation intact to 10/10 sites Right foot, 10/10 site Left foot using Bonita-Ashley monofilament  Light touch sensation present  No Tinel's or Villeux sign.    VASC:  Skin temperature Warm to Warm proximal to distal odilon  DP pulses 2/4 Right, 2/4 Left  PT pulses 2/4 Right, 2/4 Left  CFT 3 sec odilon  Pedal hair growth present  no edema noted odilon  Varicosities absent  odilon    MUSC/SKEL:  Minimal pain to surgical sites.  Ankle appears stable.  Able to hien foot   Mild discomfort to posterior lateral ankle along peroneal tendons. No pain with supination.     DERM:  Pedal nails x10 are within normal limits of length and thickness  Webspaces are Clean, Dry, and Intact  Skin is normal in turgor and texture with no open wounds or sores appreciated.  Previous incision site at anterior medial aspect of left ankle is fully coapted with no signs of hypertrophied scar  Incisions noted to left lateral ankle. Fully healed No signs of dehiscence or infection.        RADIOLOGY/NUCLEAR:  Xr Ankle 2 View Left    Result Date: 10/31/2018  Narrative: EXAMINATION: Left ankle 2 views 10/31/2018  HISTORY: Postop pain  FINDINGS: Two-view exam of the left ankle demonstrates spurring of the calcaneus along the undersurface as well as at the Achilles insertion. There is soft tissue swelling laterally. There is no fracture or dislocation. There is spurring of the medial and lateral malleolus and tibial plafond.      Impression: 1.. Arthritic changes of the ankle. There is localized soft tissue swelling laterally. No evidence of fracture. 2. Plantar spur of the calcaneus. This report was finalized on 10/31/2018 09:24 by Dr. Constantino Pulido MD.     IMPRESSION:  1. Findings suspicious for a peroneus brevis tendon tear. Tendinopathy  of the peroneus longus tendon favored.   2. Questionable tenosynovitis involving the flexor hallux longus and  posterior tibialis tendons.  3. Osteochondral defect along the lateral talar dome.  4. Scattered edema involving the tarsal likely proximal metatarsal  bones. No discrete fracture.  5. Os trigonum with minimal edema seen adjacent to the accessory  ossicle.    LABORATORY/CULTURE RESULTS:      PATHOLOGY RESULTS:       ASSESSMENT/PLAN     Ismael was seen today for follow-up.    Diagnoses and all orders for this visit:    S/P foot surgery  -     Ambulatory Referral to  Physical Therapy Evaluate and treat (s/p lateral ankle stabilization and peroneal tendon repair)      Comprehensive lower extremity examination and evaluation was performed.  Discussed findings and treatment plan including risks, benefits, and treatment options with patient in detail. Patient agreed with treatment plan.  Advised patient that he has returned to regular shoes and ambulates sooner than advised and pain is most likely due to stressing peroneal tendon reconstruction.    Referral to PT for strengthening.  Advised to wear ankle brace for at least 2-3 months to added support.   An After Visit Summary was printed and given to the patient at discharge, including (if requested) any available informative/educational handouts regarding diagnosis, treatment, or medications. All questions were answered to patient/family satisfaction. Should symptoms fail to improve or worsen they agree to call or return to clinic or to go to the Emergency Department. Discussed the importance of following up with any needed screening tests/labs/specialist appointments and any requested follow-up recommended by me today. Importance of maintaining follow-up discussed and patient accepts that missed appointments can delay diagnosis and potentially lead to worsening of conditions.  Return in about 6 weeks (around 12/12/2018)., or sooner if acute issues arise.    Lab Frequency Next Occurrence   Follow Anesthesia Guidelines / Standing Orders Once 11/09/2016   XR Ankle 3+ View Left Once 04/01/2018   XR Foot 3+ View Left Once 04/01/2018       This document has been electronically signed by Ajay Comer DPM on October 31, 2018 9:37 AM

## 2018-11-01 ENCOUNTER — TELEPHONE (OUTPATIENT)
Dept: PODIATRY | Facility: CLINIC | Age: 53
End: 2018-11-01

## 2018-11-01 NOTE — TELEPHONE ENCOUNTER
Notified patient of scheduled appointment with Dr. Davis Comer on December 12, 2018 at 8:30 am. Informed patient that Dr. Comer still needs to sign the paperwork that he brought in this morning and once he has signed, I will give him a call. Patient gave verbal understanding at this time of what was discussed.

## 2018-11-09 ENCOUNTER — TELEPHONE (OUTPATIENT)
Dept: PODIATRY | Facility: CLINIC | Age: 53
End: 2018-11-09

## 2018-11-09 NOTE — TELEPHONE ENCOUNTER
Patient called to notify me that he was told that his insurance won't cover outpatient physical therapy. States that he was given a [facility] number to call to see what needs to be done - he left a voicemail and is waiting for a return call. Patient stated he will be calling his insurance as well.

## 2018-11-19 ENCOUNTER — HOSPITAL ENCOUNTER (OUTPATIENT)
Dept: PHYSICAL THERAPY | Facility: HOSPITAL | Age: 53
Setting detail: THERAPIES SERIES
Discharge: HOME | End: 2018-11-19

## 2018-11-19 DIAGNOSIS — M25.372 ANKLE INSTABILITY, LEFT: ICD-10-CM

## 2018-11-19 DIAGNOSIS — Z98.890 S/P FOOT SURGERY, LEFT: Primary | ICD-10-CM

## 2018-11-19 DIAGNOSIS — S86.312D PERONEAL TENDON TEAR, LEFT, SUBSEQUENT ENCOUNTER: ICD-10-CM

## 2018-11-19 DIAGNOSIS — G89.29 CHRONIC PAIN OF LEFT ANKLE: ICD-10-CM

## 2018-11-19 DIAGNOSIS — M25.572 CHRONIC PAIN OF LEFT ANKLE: ICD-10-CM

## 2018-11-19 PROCEDURE — 97161 PT EVAL LOW COMPLEX 20 MIN: CPT

## 2018-11-19 NOTE — THERAPY EVALUATION
"    Outpatient Physical Therapy Ortho Initial Evaluation  [facility]     Patient Name: Ismael Huff  : 1965  MRN: [id number]  Today's Date: 2018      Visit Date: 2018    Patient Active Problem List   Diagnosis   • Osteochondral defect of ankle   • Chronic pain of left ankle   • Peroneal tendon tear, left, subsequent encounter   • Ankle instability, left        Past Medical History:   Diagnosis Date   • Ankle pain, left    • Arthritis    • Gout    • History of kidney stones    • History of pneumonia    • Tendonitis of ankle     left   • Wears dentures         Past Surgical History:   Procedure Laterality Date   • ANKLE SURGERY Left    • COLECTOMY PARTIAL / TOTAL      polyps ruptured   • COLONOSCOPY     • KNEE SURGERY Left     torn cartilage repair       Visit Dx:     ICD-10-CM ICD-9-CM   1. S/P foot surgery, left Z98.890 V45.89   2. Peroneal tendon tear, left, subsequent encounter S86.312D V58.89     844.8   3. Ankle instability, left M25.372 718.87   4. Chronic pain of left ankle M25.572 719.47    G89.29 338.29       Patient History     Row Name 18 0800             History    Chief Complaint  Pain;Muscle weakness  -RA      Type of Pain  Ankle pain  -RA      Date Current Problem(s) Began  18  -RA      Brief Description of Current Complaint  Patient under went peroneus brevis repair on . He is usure of how he tore this tendon. He says he's always had \"weak ankles\" and has had surgery on this ankle in the past.   -RA      Previous treatment for THIS PROBLEM  Surgery  -RA      Surgery Date:  18  -RA      Patient/Caregiver Goals  Relieve pain;Return to work;Improve mobility;Improve strength;Return to prior level of function  -RA      Current Tobacco Use  no  -RA      Patient's Rating of General Health  Very good  -RA      Hand Dominance  right-handed  -RA      Occupation/sports/leisure activities    -RA      How has patient tried to help " current problem?  Surgery  -RA      What clinical tests have you had for this problem?  X-ray;MRI  -RA      Results of Clinical Tests  torn peroneus brevis  -RA         Pain     Pain Location  Ankle  -RA      Pain at Present  3  -RA      Pain at Best  1  -RA      Pain at Worst  8  -RA      Pain Frequency  Constant/continuous  -RA      Pain Description  Aching;Dull  -RA      What Performance Factors Make the Current Problem(s) WORSE?  Being on feet al day  -RA      What Performance Factors Make the Current Problem(s) BETTER?  Rest  -RA      Tolerance Time- Standing  4 hours  -RA      Tolerance Time- Sitting  indefinitely  -RA      Tolerance Time- Walking  4 hours  -RA      Tolerance Time- Lying  Indefinitely  -RA      Is your sleep disturbed?  Yes  -RA      Is medication used to assist with sleep?  Yes  -RA      Total hours of sleep per night  6   -RA      What position do you sleep in?  Supine  -RA      Difficulties at work?  Yes  -RA      Difficulties with recreational activities?  Yes  -RA         Fall Risk Assessment    Any falls in the past year:  No  -RA         Services    Prior Rehab/Home Health Experiences  Yes  -RA      When was the prior experience with Rehab/Home Health  3 years ago  -RA      Where was the prior experience with Rehab/Home Health  [facility]  -RA      Are you currently receiving Home Health services  No  -RA      Do you plan to receive Home Health services in the near future  No  -RA         Daily Activities    Primary Language  English  -RA      How does patient learn best?  Listening;Reading  -RA      Teaching needs identified  Home Exercise Program;Management of Condition  -RA      Patient is concerned about/has problems with  Performing job responsibilities/community activities (work, school,;Performing sports, recreation, and play activities;Climbing Stairs;Standing;Walking  -RA      Does patient have problems with the following?  None  -RA      Barriers to learning  None  -RA      Pt  Participated in POC and Goals  Yes  -RA         Safety    Are you being hurt, hit, or frightened by anyone at home or in your life?  No  -RA      Are you being neglected by a caregiver  No  -RA        User Key  (r) = Recorded By, (t) = Taken By, (c) = Cosigned By    Initials Name Provider Type    Gricelda Oconnor PT Physical Therapist          PT Ortho     Row Name 11/19/18 0800       Posture/Observations    Posture/Observations Comments  Incision looks good. no signs of infection. Scar mbility is good. gastroc/soleus atrophy on L.  Pronates L>R  -RA       Quarter Clearing    Quarter Clearing  Lower Quarter Clearing  -RA       DTR- Lower Quarter Clearing    Patellar tendon (L2-4)  Left:;1- Minimal response;Right:;0- No response  -RA    Achilles tendon (S1-2)  Bilateral:;0- No response  -RA       Sensory Screen for Light Touch- Lower Quarter Clearing    L1 (inguinal area)  Bilateral:;Intact  -RA    L2 (anterior mid thigh)  Bilateral:;Intact  -RA    L3 (distal anterior thigh)  Bilateral:;Intact  -RA    L4 (medial lower leg/foot)  Bilateral:;Intact  -RA    L5 (lateral lower leg/great toe)  Bilateral:;Intact  -RA    S1 (bottom of foot)  Bilateral:;Intact  -RA       Myotomal Screen- Lower Quarter Clearing    Hip flexion (L2)  Bilateral:;WNL  -RA    Knee extension (L3)  Bilateral:;WNL  -RA    Ankle DF (L4)  Right:;WNL;Left:;4 (Good)  -RA    Ankle PF (S1)  Right:;WNL;Left:;4 (Good)  -RA    Knee flexion (S2)  Bilateral:;WNL  -RA       General ROM    RT Lower Ext  Rt Ankle Dorsiflexion;Rt Ankle Plantarflexion;Rt Ankle Inversion;Rt Ankle Eversion;Rt Hip Internal Rotation  -RA    LT Lower Ext  Lt Ankle Dorsiflexion;Lt Ankle Plantarflexion;Lt Ankle Inversion;Lt Ankle Eversion;Lt Hip Internal Rotation  -RA       Right Lower Ext    Rt Hip Internal Rotation PROM  20  -RA    Rt Ankle Dorsiflexion PROM  10  -RA    Rt Ankle Inversion PROM  20  -RA    Rt Ankle Eversion PROM  20  -RA       Left Lower Ext    Lt Hip Internal  Rotation PROM  10  -RA    Lt Ankle Dorsiflexion PROM  5   -RA    Lt Ankle Inversion PROM  10  -RA    Lt Ankle Eversion PROM  10  -RA       MMT (Manual Muscle Testing)    Rt Lower Ext  Rt Hip Extension;Rt Hip ABduction  -RA    Lt Lower Ext  Lt Hip Extension;Lt Hip ABduction  -RA       MMT Right Lower Ext    Rt Hip Extension MMT, Gross Movement  (4/5) good  -RA    Rt Hip ABduction MMT, Gross Movement  (4/5) good  -RA       MMT Left Lower Ext    Lt Hip Extension MMT, Gross Movement  (4-/5) good minus  -RA    Lt Hip ABduction MMT, Gross Movement  (4-/5) good minus  -RA      User Key  (r) = Recorded By, (t) = Taken By, (c) = Cosigned By    Initials Name Provider Type    Gricelda Oconnor, PT Physical Therapist                      Therapy Education  Education Details: Mobility and ice  Given: HEP, Symptoms/condition management, Pain management  Program: New  How Provided: Verbal, Demonstration  Provided to: Patient     PT OP Goals     Row Name 11/19/18 0900          PT Short Term Goals    STG Date to Achieve  12/03/18  -RA     STG 1  Patient will report a deacrease in pain for 1 week resultig in increased standing tolerance,   -RA     STG 1 Progress  New  -RA     STG 2  Patient will demonstrate 20 degrees L hip IR PROM  -RA     STG 2 Progress  New  -RA        Long Term Goals    LTG Date to Achieve  12/19/18  -RA     LTG 1  Patient will demonstrate independence with HEP  -RA     LTG 1 Progress  New  -RA     LTG 2  Patient will demonstrate 2- degreeses L ankle eversion PROM  -RA     LTG 3  Patient will demnstrate 30 degrees L ankle innversion PROM  -RA     LTG 3 Progress  New  -RA     LTG 4  Patient will demonstrate B proximal hip strength 4+/5 as measured by MMT  -RA     LTG 4 Progress  New  -RA     LTG 5  Patient will demonstrate ability to perform B single leg quarter squat with no dduction or IR of stance leg and no increaed pronation of stance foot  -RA     LTG 5 Progress  New  -RA     LTG 6  Patient will  demonstrate adequate dynamic and static stability of L ankle.   -RA     LTG 6 Progress  New  -RA        Time Calculation    PT Goal Re-Cert Due Date  12/19/18  -RA       User Key  (r) = Recorded By, (t) = Taken By, (c) = Cosigned By    Initials Name Provider Type    Gricelda Ocnonor S, PT Physical Therapist          PT Assessment/Plan     Row Name 11/19/18 0800          PT Assessment    Functional Limitations  Impaired gait;Performance in work activities;Performance in leisure activities  -RA     Impairments  Balance;Gait;Joint mobility;Muscle strength;Pain;Range of motion  -RA     Assessment Comments  Mr. Huff presents to PT today s/p fibularis brevis repair o the L. This is his second urgery in the past few years on his L ankle and I do not beleive this is a coincidence. His ankle df, eversion, and inversion PROM are both limited. His hip IR is severelty limited B, L>R, and his proximal hip strength is also decreased when measured formally with MMT. Functinally in standing, his B SLS is weak, but there is increased IR and adduction of the L hip when performing this motion. His L foot also pronates greater than the R in static standing and this increases with SLS. I do believe that alot of his L foot issues jackson worked themselves down the chain from his hips and core, where there are many strength and ROM deficits. He will benefit from skilled therapy surfaces to improve ROM and strength in his L foot, but to also correct many issues in his hips and core to hopefully decrease his chances of issues down the road.   -RA     Please refer to paper survey for additional self-reported information  Yes  -RA     Rehab Potential  Good  -RA     Patient/caregiver participated in establishment of treatment plan and goals  Yes  -RA     Patient would benefit from skilled therapy intervention  Yes  -RA        PT Plan    PT Frequency  2x/week  -RA     Predicted Duration of Therapy Intervention (Therapy Eval)  6 weeks  -RA      Planned CPT's?  PT EVAL LOW COMPLEXITY: 78417;PT RE-EVAL: 55994;PT THER PROC EA 15 MIN: 83172;PT MANUAL THERAPY EA 15 MIN: 15568;PT NEUROMUSC RE-EDUCATION EA 15 MIN: 68187;PT GAIT TRAINING EA 15 MIN: 84052;PT HOT OR COLD PACK TREAT MCARE;PT ELECTRICAL STIM UNATTEND: ;PT ELECTRICAL STIM ATTD EA 15 MIN: 99194;PT ULTRASOUND EA 15 MIN: 95026;PT THER MASS EA 15 MIN: 48565;PT THER PROC GROUP: 35255  -RA     Physical Therapy Interventions (Optional Details)  balance training;gait training;gross motor skills;home exercise program;joint mobilization;lumbar stabilization;manual therapy techniques;modalities;motor coordination training;neuromuscular re-education;patient/family education;postural re-education;ROM (Range of Motion);stair training;strengthening;stretching;taping  -RA     PT Plan Comments  Address L hip and ankle ROM issues with manual therapy techniques. Work to strengthen hips, core and L ankle with therapeutic exercises. Target lateral stability of L ankle.    -RA       User Key  (r) = Recorded By, (t) = Taken By, (c) = Cosigned By    Initials Name Provider Type    Gricelda Oconnor PT Physical Therapist                                        Time Calculation:     Therapy Suggested Charges     Code   Minutes Charges    None             Start Time: 0800  Stop Time: 0850  Time Calculation (min): 50 min     Therapy Charges for Today     Code Description Service Date Service Provider Modifiers Qty    84020672012 HC PT EVAL LOW COMPLEXITY 3 11/19/2018 Gricelda Patel PT GP 1                    Gricelda Patel PT  11/19/2018

## 2018-11-20 ENCOUNTER — HOSPITAL ENCOUNTER (OUTPATIENT)
Dept: PHYSICAL THERAPY | Facility: HOSPITAL | Age: 53
Setting detail: THERAPIES SERIES
Discharge: HOME | End: 2018-11-20

## 2018-11-20 DIAGNOSIS — Z98.890 S/P FOOT SURGERY, LEFT: Primary | ICD-10-CM

## 2018-11-20 PROCEDURE — 97140 MANUAL THERAPY 1/> REGIONS: CPT

## 2018-11-20 PROCEDURE — 97110 THERAPEUTIC EXERCISES: CPT

## 2018-11-20 NOTE — THERAPY TREATMENT NOTE
Outpatient Physical Therapy Ortho Treatment Note   [place]     Patient Name: Ismael Huff  : 1965  MRN: [id number]  Today's Date: 2018      Visit Date: 2018    Visit Dx:    ICD-10-CM ICD-9-CM   1. S/P foot surgery, left Z98.890 V45.89       Patient Active Problem List   Diagnosis   • Osteochondral defect of ankle   • Chronic pain of left ankle   • Peroneal tendon tear, left, subsequent encounter   • Ankle instability, left        Past Medical History:   Diagnosis Date   • Ankle pain, left    • Arthritis    • Gout    • History of kidney stones    • History of pneumonia    • Tendonitis of ankle     left   • Wears dentures         Past Surgical History:   Procedure Laterality Date   • ANKLE SURGERY Left    • COLECTOMY PARTIAL / TOTAL      polyps ruptured   • COLONOSCOPY     • KNEE SURGERY Left     torn cartilage repair       PT Ortho     Row Name 18 0800       Posture/Observations    Posture/Observations Comments  Incision looks good. no signs of infection. Scar mbility is good. gastroc/soleus atrophy on L.  Pronates L>R  -RA       Quarter Clearing    Quarter Clearing  Lower Quarter Clearing  -RA       DTR- Lower Quarter Clearing    Patellar tendon (L2-4)  Left:;1- Minimal response;Right:;0- No response  -RA    Achilles tendon (S1-2)  Bilateral:;0- No response  -RA       Sensory Screen for Light Touch- Lower Quarter Clearing    L1 (inguinal area)  Bilateral:;Intact  -RA    L2 (anterior mid thigh)  Bilateral:;Intact  -RA    L3 (distal anterior thigh)  Bilateral:;Intact  -RA    L4 (medial lower leg/foot)  Bilateral:;Intact  -RA    L5 (lateral lower leg/great toe)  Bilateral:;Intact  -RA    S1 (bottom of foot)  Bilateral:;Intact  -RA       Myotomal Screen- Lower Quarter Clearing    Hip flexion (L2)  Bilateral:;WNL  -RA    Knee extension (L3)  Bilateral:;WNL  -RA    Ankle DF (L4)  Right:;WNL;Left:;4 (Good)  -RA    Ankle PF (S1)  Right:;WNL;Left:;4 (Good)  -RA    Knee flexion  (S2)  Bilateral:;WNL  -RA       General ROM    RT Lower Ext  Rt Ankle Dorsiflexion;Rt Ankle Plantarflexion;Rt Ankle Inversion;Rt Ankle Eversion;Rt Hip Internal Rotation  -RA    LT Lower Ext  Lt Ankle Dorsiflexion;Lt Ankle Plantarflexion;Lt Ankle Inversion;Lt Ankle Eversion;Lt Hip Internal Rotation  -RA       Right Lower Ext    Rt Hip Internal Rotation PROM  20  -RA    Rt Ankle Dorsiflexion PROM  10  -RA    Rt Ankle Inversion PROM  20  -RA    Rt Ankle Eversion PROM  20  -RA       Left Lower Ext    Lt Hip Internal Rotation PROM  10  -RA    Lt Ankle Dorsiflexion PROM  5   -RA    Lt Ankle Inversion PROM  10  -RA    Lt Ankle Eversion PROM  10  -RA       MMT (Manual Muscle Testing)    Rt Lower Ext  Rt Hip Extension;Rt Hip ABduction  -RA    Lt Lower Ext  Lt Hip Extension;Lt Hip ABduction  -RA       MMT Right Lower Ext    Rt Hip Extension MMT, Gross Movement  (4/5) good  -RA    Rt Hip ABduction MMT, Gross Movement  (4/5) good  -RA       MMT Left Lower Ext    Lt Hip Extension MMT, Gross Movement  (4-/5) good minus  -RA    Lt Hip ABduction MMT, Gross Movement  (4-/5) good minus  -RA      User Key  (r) = Recorded By, (t) = Taken By, (c) = Cosigned By    Initials Name Provider Type    Gricelda Oconnor, PT Physical Therapist                      PT Assessment/Plan     Row Name 11/20/18 1700          PT Assessment    Assessment Comments  This is Mr sauceda's first visit after his inital weval yesterday. There has been no change. Today, we worked on a lot of df and eversion mobility. We also began some hip strengthening. He tolerated all very well.   -RA        PT Plan    PT Plan Comments  Address L hip and ankle ROM issues with manual therapy techniques. Work to strengthen hips, core and L ankle with therapeutic exercises. Target lateral stability of L ankle.    -RA       User Key  (r) = Recorded By, (t) = Taken By, (c) = Cosigned By    Initials Name Provider Type    Gricelda Oconnor, PT Physical Therapist               Exercises     Row Name 11/20/18 1700 11/20/18 1300          Subjective Pain    Able to rate subjective pain?  --  yes  -RA     Pre-Treatment Pain Level  --  2  -RA     Post-Treatment Pain Level  --  2  -RA        Total Minutes    90851 - PT Therapeutic Exercise Minutes  --  10  -RA     78889 - PT Manual Therapy Minutes  35  -RA  --        Exercise 1    Exercise Name 1  --  Ankle circles  -RA     Cueing 1  --  Tactile;Demo;Verbal  -RA     Sets 1  --  2  -RA     Reps 1  --  10  -RA        Exercise 2    Exercise Name 2  --  L hip rhythmic stabs  -RA     Cueing 2  --  Verbal;Tactile  -RA     Sets 2  --  2  -RA     Time 2  --  15 seconds  -RA       User Key  (r) = Recorded By, (t) = Taken By, (c) = Cosigned By    Initials Name Provider Type    Gricelda Oconnor, PT Physical Therapist                        Manual Rx (last 36 hours)      Manual Treatments     Row Name 11/20/18 1700             Total Minutes    21715 - PT Manual Therapy Minutes  35  -RA         Manual Rx 1    Manual Rx 1 Location  STM L achilles  -RA      Manual Rx 1 Grade  mod  -RA      Manual Rx 1 Duration  10  -RA         Manual Rx 2    Manual Rx 2 Location  L talocrual   -RA      Manual Rx 2 Type  A-P  -RA      Manual Rx 2 Grade  3  -RA      Manual Rx 2 Duration  10  -RA         Manual Rx 3    Manual Rx 3 Location  Soleus stretch  -RA      Manual Rx 3 Grade  mod  -RA      Manual Rx 3 Duration  5  -RA         Manual Rx 4    Manual Rx 4 Location  Medial tibial glide   -RA      Manual Rx 4 Grade  min  -RA      Manual Rx 4 Duration  5  -RA         Manual Rx 5    Manual Rx 5 Location  Hip IR and adduction stretch  -RA      Manual Rx 5 Grade  mod  -RA      Manual Rx 5 Duration  5  -RA        User Key  (r) = Recorded By, (t) = Taken By, (c) = Cosigned By    Initials Name Provider Type    Gricelda Oconnor, PT Physical Therapist          PT OP Goals     Row Name 11/20/18 1700          PT Short Term Goals    STG Date to Achieve  12/03/18  -RA      STG 1  Patient will report a deacrease in pain for 1 week resultig in increased standing tolerance,   -RA     STG 1 Progress  New  -RA     STG 2  Patient will demonstrate 20 degrees L hip IR PROM  -RA     STG 2 Progress  New  -RA        Long Term Goals    LTG Date to Achieve  12/19/18  -RA     LTG 1  Patient will demonstrate independence with HEP  -RA     LTG 1 Progress  New  -RA     LTG 2  Patient will demonstrate 2- degreeses L ankle eversion PROM  -RA     LTG 2 Progress  New  -RA     LTG 3  Patient will demonstrate 30 degrees L ankle innversion PROM  -RA     LTG 3 Progress  Ongoing  -RA     LTG 3 Progress Comments  Still very limited  -RA     LTG 4  Patient will demonstrate B proximal hip strength 4+/5 as measured by MMT  -RA     LTG 4 Progress  New  -RA     LTG 5  Patient will demonstrate ability to perform B single leg quarter squat with no dduction or IR of stance leg and no increased pronation of stance foot  -RA     LTG 5 Progress  New  -RA     LTG 6  Patient will demonstrate adequate dynamic and static stability of L ankle.   -RA     LTG 6 Progress  New  -RA       User Key  (r) = Recorded By, (t) = Taken By, (c) = Cosigned By    Initials Name Provider Type    Gricelda Oconnor, PT Physical Therapist          Therapy Education  Education Details: Ankle circles  Given: HEP  Program: New  How Provided: Verbal, Demonstration, Written  Provided to: Patient              Time Calculation:   Start Time: 1315  Stop Time: 1400  Time Calculation (min): 45 min  Total Timed Code Minutes- PT: 45 minute(s)  Therapy Suggested Charges     Code   Minutes Charges    51117 (CPT®) Hc Pt Neuromusc Re Education Ea 15 Min      93763 (CPT®) Hc Pt Ther Proc Ea 15 Min      06068 (CPT®) Hc Gait Training Ea 15 Min      93582 (CPT®) Hc Pt Therapeutic Act Ea 15 Min      08702 (CPT®) Hc Pt Manual Therapy Ea 15 Min 35 2    36381 (CPT®) Hc Pt Ther Massage- Per 15 Min      18133 (CPT®) Hc Pt Iontophoresis Ea 15 Min      44082 (CPT®)  Hc Pt Elec Stim Ea-Per 15 Min      16309 (CPT®) Hc Pt Ultrasound Ea 15 Min      18359 (CPT®) Hc Pt Self Care/Mgmt/Train Ea 15 Min      82641 (CPT®) Hc Pt Prosthetic (S) Train Initial Encounter, Each 15 Min      54734 (CPT®) Hc Orthotic(S) Mgmt/Train Initial Encounter, Each 15min      72888 (CPT®) Hc Pt Aquatic Therapy Ea 15 Min      67420 (CPT®) Hc Pt Orthotic(S)/Prosthetic(S) Encounter, Each 15 Min       (CPT®) Hc Pt Electrical Stim Unattended      Total  35 2        Therapy Charges for Today     Code Description Service Date Service Provider Modifiers Qty    33898939086 HC PT MANUAL THERAPY EA 15 MIN 11/20/2018 Gricelda Patel, PT GP 2    05038087885 HC PT THER PROC EA 15 MIN 11/20/2018 Gricelda Patel, PT GP 1                    Gricelda Patel, TRA  11/20/2018

## 2018-11-26 ENCOUNTER — HOSPITAL ENCOUNTER (OUTPATIENT)
Dept: PHYSICAL THERAPY | Facility: HOSPITAL | Age: 53
Setting detail: THERAPIES SERIES
Discharge: HOME | End: 2018-11-26

## 2018-11-26 DIAGNOSIS — G89.29 CHRONIC PAIN OF LEFT ANKLE: ICD-10-CM

## 2018-11-26 DIAGNOSIS — M25.372 ANKLE INSTABILITY, LEFT: ICD-10-CM

## 2018-11-26 DIAGNOSIS — Z98.890 S/P FOOT SURGERY, LEFT: Primary | ICD-10-CM

## 2018-11-26 DIAGNOSIS — S86.312D PERONEAL TENDON TEAR, LEFT, SUBSEQUENT ENCOUNTER: ICD-10-CM

## 2018-11-26 DIAGNOSIS — M25.572 CHRONIC PAIN OF LEFT ANKLE: ICD-10-CM

## 2018-11-26 DIAGNOSIS — M95.8 OSTEOCHONDRAL DEFECT OF ANKLE: ICD-10-CM

## 2018-11-26 PROCEDURE — 97032 APPL MODALITY 1+ESTIM EA 15: CPT

## 2018-11-26 PROCEDURE — 97140 MANUAL THERAPY 1/> REGIONS: CPT

## 2018-11-26 PROCEDURE — 97110 THERAPEUTIC EXERCISES: CPT

## 2018-11-26 NOTE — THERAPY TREATMENT NOTE
Outpatient Physical Therapy Ortho Treatment Note  [facility]     Patient Name: Ismael Huff  : 1965  MRN: [id number]  Today's Date: 2018      Visit Date: 2018    Visit Dx:    ICD-10-CM ICD-9-CM   1. S/P foot surgery, left Z98.890 V45.89   2. Peroneal tendon tear, left, subsequent encounter S86.312D V58.89     844.8   3. Ankle instability, left M25.372 718.87   4. Chronic pain of left ankle M25.572 719.47    G89.29 338.29   5. Osteochondral defect of ankle M95.8 738.8       Patient Active Problem List   Diagnosis   • Osteochondral defect of ankle   • Chronic pain of left ankle   • Peroneal tendon tear, left, subsequent encounter   • Ankle instability, left        Past Medical History:   Diagnosis Date   • Ankle pain, left    • Arthritis    • Gout    • History of kidney stones    • History of pneumonia    • Tendonitis of ankle     left   • Wears dentures         Past Surgical History:   Procedure Laterality Date   • ANKLE SURGERY Left    • COLECTOMY PARTIAL / TOTAL      polyps ruptured   • COLONOSCOPY     • KNEE SURGERY Left     torn cartilage repair                       PT Assessment/Plan     Row Name 18 1649          PT Assessment    Assessment Comments  He lacks foot intrinsic strength and length of the FHL which changes the mechanics of the midfoot; however, his B arches are relatively flat.  -EC        PT Plan    PT Plan Comments  Work on foot intrinsics and continue to progress proprioceptive activities.  -EC       User Key  (r) = Recorded By, (t) = Taken By, (c) = Cosigned By    Initials Name Provider Type    EC Varun Archibald PTA Physical Therapy Assistant          Modalities     Row Name 18 1300             ELECTRICAL STIMULATION    Attended/Unattended  Attended  -EC      Stimulation Type  -- Laser Stim Inflammation Mode  -EC      Location/Electrode Placement/Other  L lateral maleoli  -EC      59953 - PT E-Stim Attended (Manual) Minutes  10  -EC         User Key  (r) = Recorded By, (t) = Taken By, (c) = Cosigned By    Initials Name Provider Type    Varun John PTA Physical Therapy Assistant          Exercises     Row Name 11/26/18 1300             Subjective Comments    Subjective Comments  Pt reports L heel and ankle pain  -EC         Subjective Pain    Able to rate subjective pain?  yes  -EC      Pre-Treatment Pain Level  2  -EC      Post-Treatment Pain Level  2  -EC         Total Minutes    23316 - PT Therapeutic Exercise Minutes  12  -EC      25095 - PT Manual Therapy Minutes  23  -EC         Exercise 1    Exercise Name 1  seated PF/PDF, pronation/supination, CW, and CCW with BAPS L2  -EC      Time 1  2 min each  -EC         Exercise 2    Exercise Name 2  L FHL stretches  -EC        User Key  (r) = Recorded By, (t) = Taken By, (c) = Cosigned By    Initials Name Provider Type    Varun John PTA Physical Therapy Assistant                        Manual Rx (last 36 hours)      Manual Treatments     Row Name 11/26/18 1300             Total Minutes    45024 - PT Manual Therapy Minutes  23  -EC         Manual Rx 1    Manual Rx 1 Location  L 1st MTP  -EC      Manual Rx 1 Type  A/P glides  -EC      Manual Rx 1 Grade  2  -EC      Manual Rx 1 Duration  5  -EC         Manual Rx 2    Manual Rx 2 Location  L plantar surface  -EC      Manual Rx 2 Type  DFR  -EC      Manual Rx 2 Duration  8  -EC         Manual Rx 3    Manual Rx 3 Location  L gastroc  -EC      Manual Rx 3 Type  IASTM with EDGE tool  -EC      Manual Rx 3 Duration  10  -EC        User Key  (r) = Recorded By, (t) = Taken By, (c) = Cosigned By    Initials Name Provider Type    Varun John PTA Physical Therapy Assistant          PT OP Goals     Row Name 11/26/18 1600 11/26/18 1349       PT Short Term Goals    STG Date to Achieve  12/03/18  -EC  --    STG 1  Patient will report a deacrease in pain for 1 week resultig in increased standing tolerance,   -EC  --    STG 1 Progress  Ongoing   -EC  --    STG 2  Patient will demonstrate 20 degrees L hip IR PROM  -EC  --    STG 2 Progress  Ongoing  -EC  --       Long Term Goals    LTG Date to Achieve  12/19/18  -EC  --    LTG 1  Patient will demonstrate independence with HEP  -EC  --    LTG 1 Progress  Ongoing  -EC  --    LTG 1 Progress Comments  added self 1st MTP mobilizations today.  -EC  --    LTG 2  Patient will demonstrate 2- degreeses L ankle eversion PROM  -EC  --    LTG 2 Progress  Ongoing  -EC  --    LTG 3  Patient will demonstrate 30 degrees L ankle innversion PROM  -EC  --    LTG 3 Progress  Ongoing  -EC  --    LTG 4  Patient will demonstrate B proximal hip strength 4+/5 as measured by MMT  -EC  --    LTG 4 Progress  Ongoing  -EC  --    LTG 5  Patient will demonstrate ability to perform B single leg quarter squat with no dduction or IR of stance leg and no increased pronation of stance foot  -EC  --    LTG 5 Progress  Ongoing  -EC  --    LTG 6  Patient will demonstrate adequate dynamic and static stability of L ankle.   -EC  --    LTG 6 Progress  Ongoing  -EC  --       Time Calculation    PT Goal Re-Cert Due Date  --  12/19/18  -EC      User Key  (r) = Recorded By, (t) = Taken By, (c) = Cosigned By    Initials Name Provider Type    Varun John PTA Physical Therapy Assistant          Therapy Education  Education Details: self 1st MTP glides 5 min twice daily  Given: HEP  Program: New  How Provided: Verbal, Demonstration  Provided to: Patient  Level of Understanding: Verbalized              Time Calculation:   Start Time: 1349  Stop Time: 1434  Time Calculation (min): 45 min  Total Timed Code Minutes- PT: 45 minute(s)  Therapy Suggested Charges     Code   Minutes Charges    13033 (CPT®) Hc Pt Neuromusc Re Education Ea 15 Min      16854 (CPT®) Hc Pt Ther Proc Ea 15 Min 12 1    71990 (CPT®) Hc Gait Training Ea 15 Min      49758 (CPT®) Hc Pt Therapeutic Act Ea 15 Min      89959 (CPT®) Hc Pt Manual Therapy Ea 15 Min 23 1    72476 (CPT®) Hc  Pt Ther Massage- Per 15 Min      98997 (CPT®) Hc Pt Iontophoresis Ea 15 Min      17563 (CPT®) Hc Pt Elec Stim Ea-Per 15 Min 10 1    78999 (CPT®) Hc Pt Ultrasound Ea 15 Min      07573 (CPT®) Hc Pt Self Care/Mgmt/Train Ea 15 Min      62805 (CPT®) Hc Pt Prosthetic (S) Train Initial Encounter, Each 15 Min      56525 (CPT®) Hc Orthotic(S) Mgmt/Train Initial Encounter, Each 15min      14762 (CPT®) Hc Pt Aquatic Therapy Ea 15 Min      38799 (CPT®) Hc Pt Orthotic(S)/Prosthetic(S) Encounter, Each 15 Min       (CPT®) Hc Pt Electrical Stim Unattended      Total  45 3        Therapy Charges for Today     Code Description Service Date Service Provider Modifiers Qty    94777451732 HC PT THER PROC EA 15 MIN 11/26/2018 Varun Archibald, PTA GP 1    43642439386 HC PT MANUAL THERAPY EA 15 MIN 11/26/2018 Varun Archibald, PTA GP 1    21824814156 HC PT ELEC STIM EA-PER 15 MIN 11/26/2018 Varun Archibald, PTA GP 1                    Varun Archibald, VIC  11/26/2018

## 2018-11-28 ENCOUNTER — HOSPITAL ENCOUNTER (OUTPATIENT)
Dept: PHYSICAL THERAPY | Facility: HOSPITAL | Age: 53
Setting detail: THERAPIES SERIES
Discharge: HOME | End: 2018-11-28

## 2018-11-28 DIAGNOSIS — M95.8 OSTEOCHONDRAL DEFECT OF ANKLE: ICD-10-CM

## 2018-11-28 DIAGNOSIS — Z98.890 S/P FOOT SURGERY, LEFT: Primary | ICD-10-CM

## 2018-11-28 DIAGNOSIS — M25.572 CHRONIC PAIN OF LEFT ANKLE: ICD-10-CM

## 2018-11-28 DIAGNOSIS — G89.29 CHRONIC PAIN OF LEFT ANKLE: ICD-10-CM

## 2018-11-28 DIAGNOSIS — S86.312D PERONEAL TENDON TEAR, LEFT, SUBSEQUENT ENCOUNTER: ICD-10-CM

## 2018-11-28 DIAGNOSIS — M25.372 ANKLE INSTABILITY, LEFT: ICD-10-CM

## 2018-11-28 PROCEDURE — 97032 APPL MODALITY 1+ESTIM EA 15: CPT

## 2018-11-28 PROCEDURE — 97110 THERAPEUTIC EXERCISES: CPT

## 2018-11-28 NOTE — THERAPY TREATMENT NOTE
Outpatient Physical Therapy Ortho Treatment Note  [facility]     Patient Name: Ismael Huff  : 1965  MRN: [id number]  Today's Date: 2018      Visit Date: 2018    Visit Dx:    ICD-10-CM ICD-9-CM   1. S/P foot surgery, left Z98.890 V45.89   2. Peroneal tendon tear, left, subsequent encounter S86.312D V58.89     844.8   3. Ankle instability, left M25.372 718.87   4. Chronic pain of left ankle M25.572 719.47    G89.29 338.29   5. Osteochondral defect of ankle M95.8 738.8       Patient Active Problem List   Diagnosis   • Osteochondral defect of ankle   • Chronic pain of left ankle   • Peroneal tendon tear, left, subsequent encounter   • Ankle instability, left        Past Medical History:   Diagnosis Date   • Ankle pain, left    • Arthritis    • Gout    • History of kidney stones    • History of pneumonia    • Tendonitis of ankle     left   • Wears dentures         Past Surgical History:   Procedure Laterality Date   • ANKLE SURGERY Left    • COLECTOMY PARTIAL / TOTAL      polyps ruptured   • COLONOSCOPY     • KNEE SURGERY Left     torn cartilage repair                       PT Assessment/Plan     Row Name 18 1450          PT Assessment    Assessment Comments  We addressed the lack of L foot intrinsic strength today with B short foot walking which was difficult for him to perform. He also exhibited decreased B hip strength.  -EC        PT Plan    PT Plan Comments  Continue strengthening and proprioceptive activities.  -EC       User Key  (r) = Recorded By, (t) = Taken By, (c) = Cosigned By    Initials Name Provider Type    EC Varun Archibald PTA Physical Therapy Assistant          Modalities     Row Name 18 1300             ELECTRICAL STIMULATION    Attended/Unattended  Attended  -EC      Stimulation Type  -- Laser Stim Inflammation Mode  -EC      Location/Electrode Placement/Other  L lateral maleoli  -EC      97785 - PT E-Stim Attended (Manual) Minutes  10  -EC         User Key  (r) = Recorded By, (t) = Taken By, (c) = Cosigned By    Initials Name Provider Type    EC Varun Archibald PTA Physical Therapy Assistant          Exercises     Row Name 11/28/18 1300             Subjective Comments    Subjective Comments  Pt denies pain or new complaints  -EC         Subjective Pain    Able to rate subjective pain?  yes  -EC      Pre-Treatment Pain Level  0  -EC      Post-Treatment Pain Level  0  -EC         Total Minutes    16975 - PT Therapeutic Exercise Minutes  33  -EC         Exercise 1    Exercise Name 1  resisted DF with red T band   -EC      Reps 1  20  -EC         Exercise 2    Exercise Name 2  resisted L ankle pronation with yellow T band  -EC      Sets 2  2  -EC      Reps 2  10  -EC         Exercise 3    Exercise Name 3  B short foot walking 5 ft x 2  -EC         Exercise 4    Exercise Name 4  L pillow case scrunchies in sitting  -EC      Time 4  2 min  -EC         Exercise 5    Exercise Name 5  attempted standing wall ball abduction with 45 cm swiss ball  -EC      Additional Comments  too difficult  -EC         Exercise 6    Exercise Name 6  B unilateral sidelying wall ball hip abduction/extension with 45 cm swiss ball  -EC      Reps 6  10  -EC        User Key  (r) = Recorded By, (t) = Taken By, (c) = Cosigned By    Initials Name Provider Type    Varun John PTA Physical Therapy Assistant                         PT OP Goals     Row Name 11/28/18 1347          PT Short Term Goals    STG Date to Achieve  12/03/18  -EC     STG 1  Patient will report a deacrease in pain for 1 week resultig in increased standing tolerance,   -EC     STG 1 Progress  Ongoing  -EC     STG 2  Patient will demonstrate 20 degrees L hip IR PROM  -EC     STG 2 Progress  Ongoing  -EC        Long Term Goals    LTG Date to Achieve  12/19/18  -EC     LTG 1  Patient will demonstrate independence with HEP  -EC     LTG 1 Progress  Ongoing  -EC     LTG 1 Progress Comments  added pillow case  scrunchies today  -EC     LTG 2  Patient will demonstrate 2- degreeses L ankle eversion PROM  -EC     LTG 2 Progress  Ongoing  -EC     LTG 3  Patient will demonstrate 30 degrees L ankle innversion PROM  -EC     LTG 3 Progress  Ongoing  -EC     LTG 4  Patient will demonstrate B proximal hip strength 4+/5 as measured by MMT  -EC     LTG 4 Progress  Ongoing  -EC     LTG 5  Patient will demonstrate ability to perform B single leg quarter squat with no dduction or IR of stance leg and no increased pronation of stance foot  -EC     LTG 5 Progress  Ongoing  -EC     LTG 6  Patient will demonstrate adequate dynamic and static stability of L ankle.   -EC     LTG 6 Progress  Ongoing  -EC        Time Calculation    PT Goal Re-Cert Due Date  12/19/18  -EC       User Key  (r) = Recorded By, (t) = Taken By, (c) = Cosigned By    Initials Name Provider Type    Varun John, PTA Physical Therapy Assistant          Therapy Education  Education Details: seated pillow case scrunchies 1-2 daily for 2 min  Given: HEP  Program: New  How Provided: Verbal, Demonstration  Provided to: Patient  Level of Understanding: Verbalized, Demonstrated              Time Calculation:   Start Time: 1347  Stop Time: 1430  Time Calculation (min): 43 min  Total Timed Code Minutes- PT: 43 minute(s)  Therapy Suggested Charges     Code   Minutes Charges    97948 (CPT®) Hc Pt Neuromusc Re Education Ea 15 Min      11363 (CPT®) Hc Pt Ther Proc Ea 15 Min 33 2    68678 (CPT®) Hc Gait Training Ea 15 Min      12532 (CPT®) Hc Pt Therapeutic Act Ea 15 Min      57687 (CPT®) Hc Pt Manual Therapy Ea 15 Min      95430 (CPT®) Hc Pt Ther Massage- Per 15 Min      31882 (CPT®) Hc Pt Iontophoresis Ea 15 Min      24092 (CPT®) Hc Pt Elec Stim Ea-Per 15 Min 10 1    81728 (CPT®) Hc Pt Ultrasound Ea 15 Min      82270 (CPT®) Hc Pt Self Care/Mgmt/Train Ea 15 Min      30369 (CPT®) Hc Pt Prosthetic (S) Train Initial Encounter, Each 15 Min      48899 (CPT®) Hc Orthotic(S)  Mgmt/Train Initial Encounter, Each 15min      52570 (CPT®) Hc Pt Aquatic Therapy Ea 15 Min      82199 (CPT®) Hc Pt Orthotic(S)/Prosthetic(S) Encounter, Each 15 Min       (CPT®) Hc Pt Electrical Stim Unattended      Total  43 3        Therapy Charges for Today     Code Description Service Date Service Provider Modifiers Qty    11709297569 HC PT THER PROC EA 15 MIN 11/28/2018 Varun Archibald, PTA GP 2    95074584062 HC PT ELEC STIM EA-PER 15 MIN 11/28/2018 Varun Archibald, PTA GP 1                    Varun Archibald, VIC  11/28/2018

## 2018-12-03 ENCOUNTER — HOSPITAL ENCOUNTER (OUTPATIENT)
Dept: PHYSICAL THERAPY | Facility: HOSPITAL | Age: 53
Setting detail: THERAPIES SERIES
Discharge: HOME | End: 2018-12-03

## 2018-12-03 DIAGNOSIS — M25.572 CHRONIC PAIN OF LEFT ANKLE: ICD-10-CM

## 2018-12-03 DIAGNOSIS — Z98.890 S/P FOOT SURGERY, LEFT: Primary | ICD-10-CM

## 2018-12-03 DIAGNOSIS — G89.29 CHRONIC PAIN OF LEFT ANKLE: ICD-10-CM

## 2018-12-03 DIAGNOSIS — M25.372 ANKLE INSTABILITY, LEFT: ICD-10-CM

## 2018-12-03 DIAGNOSIS — S86.312D PERONEAL TENDON TEAR, LEFT, SUBSEQUENT ENCOUNTER: ICD-10-CM

## 2018-12-03 DIAGNOSIS — M95.8 OSTEOCHONDRAL DEFECT OF ANKLE: ICD-10-CM

## 2018-12-03 PROCEDURE — 97140 MANUAL THERAPY 1/> REGIONS: CPT

## 2018-12-03 PROCEDURE — 97110 THERAPEUTIC EXERCISES: CPT

## 2018-12-03 PROCEDURE — 97032 APPL MODALITY 1+ESTIM EA 15: CPT

## 2018-12-03 NOTE — THERAPY TREATMENT NOTE
Outpatient Physical Therapy Ortho Treatment Note  [facility]     Patient Name: Ismael Huff  : 1965  MRN: [id number]  Today's Date: 12/3/2018      Visit Date: 2018    Visit Dx:    ICD-10-CM ICD-9-CM   1. S/P foot surgery, left Z98.890 V45.89   2. Peroneal tendon tear, left, subsequent encounter S86.312D V58.89     844.8   3. Ankle instability, left M25.372 718.87   4. Chronic pain of left ankle M25.572 719.47    G89.29 338.29   5. Osteochondral defect of ankle M95.8 738.8       Patient Active Problem List   Diagnosis   • Osteochondral defect of ankle   • Chronic pain of left ankle   • Peroneal tendon tear, left, subsequent encounter   • Ankle instability, left        Past Medical History:   Diagnosis Date   • Ankle pain, left    • Arthritis    • Gout    • History of kidney stones    • History of pneumonia    • Tendonitis of ankle     left   • Wears dentures         Past Surgical History:   Procedure Laterality Date   • ANKLE SURGERY Left    • COLECTOMY PARTIAL / TOTAL      polyps ruptured   • COLONOSCOPY     • KNEE SURGERY Left     torn cartilage repair                       PT Assessment/Plan     Row Name 18 1438          PT Assessment    Assessment Comments  He had a little more pain today and reported it was along the achillies instead of his usual site the lateral side of his L ankle.   -EC        PT Plan    PT Plan Comments  Follow up his long term response to the HEP component being placed on hold.  -EC       User Key  (r) = Recorded By, (t) = Taken By, (c) = Cosigned By    Initials Name Provider Type    EC Varun Archibald, PTA Physical Therapy Assistant          Modalities     Row Name 18 1300             ELECTRICAL STIMULATION    Attended/Unattended  Attended  -EC      Location/Electrode Placement/Other  L achilles globally  -EC      07175 - PT E-Stim Attended (Manual) Minutes  15  -EC        User Key  (r) = Recorded By, (t) = Taken By, (c) = Cosigned By     Initials Name Provider Type    Varun John PTA Physical Therapy Assistant          Exercises     Row Name 12/03/18 1300             Subjective Comments    Subjective Comments  He reports having some pain along his L achilles when he first stands to walk after sitting a long period  -EC         Subjective Pain    Able to rate subjective pain?  yes  -EC      Pre-Treatment Pain Level  4  -EC      Post-Treatment Pain Level  3  -EC         Total Minutes    58287 - PT Therapeutic Exercise Minutes  21  -EC      24665 - PT Manual Therapy Minutes  13  -EC         Exercise 1    Exercise Name 1  L hip IR stretches with intermittent inferior lateral glides   -EC         Exercise 2    Exercise Name 2  seated L DF/PF, pronation/supination, CW, CCW on BOSU blue side up  -EC      Cueing 2  Verbal;Demo  -EC      Time 2  2 min each  -EC        User Key  (r) = Recorded By, (t) = Taken By, (c) = Cosigned By    Initials Name Provider Type    Varun John PTA Physical Therapy Assistant                        Manual Rx (last 36 hours)      Manual Treatments     Row Name 12/03/18 1300             Total Minutes    30404 - PT Manual Therapy Minutes  13  -EC         Manual Rx 1    Manual Rx 1 Location  L gastroc  -EC      Manual Rx 1 Type  IASTM with EDGE tool  -EC      Manual Rx 1 Duration  10  -EC         Manual Rx 2    Manual Rx 2 Location  L tibia  -EC      Manual Rx 2 Type  IR glides  -EC      Manual Rx 2 Grade  2 and 3  -EC      Manual Rx 2 Duration  3  -EC        User Key  (r) = Recorded By, (t) = Taken By, (c) = Cosigned By    Initials Name Provider Type    Varun John PTA Physical Therapy Assistant          PT OP Goals     Row Name 12/03/18 1340          PT Short Term Goals    STG Date to Achieve  12/03/18  -EC     STG 1  Patient will report a deacrease in pain for 1 week resultig in increased standing tolerance,   -EC     STG 1 Progress  Ongoing  -EC     STG 2  Patient will demonstrate 20 degrees L hip  IR PROM  -EC     STG 2 Progress  Ongoing  -EC     STG 2 Progress Comments  17 post stretching and mobilizations today  -EC        Long Term Goals    LTG Date to Achieve  12/19/18  -EC     LTG 1  Patient will demonstrate independence with HEP  -EC     LTG 1 Progress  Ongoing  -EC     LTG 2  Patient will demonstrate 2- degreeses L ankle eversion PROM  -EC     LTG 2 Progress  Ongoing  -EC     LTG 3  Patient will demonstrate 30 degrees L ankle innversion PROM  -EC     LTG 3 Progress  Ongoing  -EC     LTG 4  Patient will demonstrate B proximal hip strength 4+/5 as measured by MMT  -EC     LTG 4 Progress  Ongoing  -EC     LTG 5  Patient will demonstrate ability to perform B single leg quarter squat with no dduction or IR of stance leg and no increased pronation of stance foot  -EC     LTG 5 Progress  Ongoing  -EC     LTG 6  Patient will demonstrate adequate dynamic and static stability of L ankle.   -EC     LTG 6 Progress  Ongoing  -EC        Time Calculation    PT Goal Re-Cert Due Date  12/19/18  -       User Key  (r) = Recorded By, (t) = Taken By, (c) = Cosigned By    Initials Name Provider Type    Varun John, PTA Physical Therapy Assistant          Therapy Education  Education Details: placed pillow case scrunchies on hold til after next session  Given: HEP  Program: Other (comment)  Provided to: Patient  Level of Understanding: Verbalized              Time Calculation:   Start Time: 1340  Stop Time: 1429  Time Calculation (min): 49 min  Total Timed Code Minutes- PT: 49 minute(s)  Therapy Suggested Charges     Code   Minutes Charges    28870 (CPT®) Hc Pt Neuromusc Re Education Ea 15 Min      00988 (CPT®) Hc Pt Ther Proc Ea 15 Min 21 1    99948 (CPT®) Hc Gait Training Ea 15 Min      86044 (CPT®) Hc Pt Therapeutic Act Ea 15 Min      06972 (CPT®) Hc Pt Manual Therapy Ea 15 Min 13 1    43428 (CPT®) Hc Pt Ther Massage- Per 15 Min      53979 (CPT®) Hc Pt Iontophoresis Ea 15 Min      28942 (CPT®) Hc Pt Elec Stim  Ea-Per 15 Min 15 1    96151 (CPT®) Hc Pt Ultrasound Ea 15 Min      63968 (CPT®) Hc Pt Self Care/Mgmt/Train Ea 15 Min      05609 (CPT®) Hc Pt Prosthetic (S) Train Initial Encounter, Each 15 Min      13375 (CPT®) Hc Orthotic(S) Mgmt/Train Initial Encounter, Each 15min      72247 (CPT®) Hc Pt Aquatic Therapy Ea 15 Min      58203 (CPT®) Hc Pt Orthotic(S)/Prosthetic(S) Encounter, Each 15 Min       (CPT®) Hc Pt Electrical Stim Unattended      Total  49 3        Therapy Charges for Today     Code Description Service Date Service Provider Modifiers Qty    54844301855 HC PT THER PROC EA 15 MIN 12/3/2018 Varun Archibald, PTA GP 1    81704718912 HC PT MANUAL THERAPY EA 15 MIN 12/3/2018 Varun Archibald, PTA GP 1    17749700106 HC PT ELEC STIM EA-PER 15 MIN 12/3/2018 Varun Archibald, PTA GP 1                    Varun Archibald PTA  12/3/2018

## 2018-12-04 NOTE — PROGRESS NOTES
[facility] - PODIATRY    Today's Date: 12/12/18    Patient Name: Ismael Huff  MRN: [id number]  CSN: [id number]  PCP: Edouard Madrigal DO  Referring Provider: No ref. provider found    SUBJECTIVE     Chief Complaint   Patient presents with   • Follow-up     Patient is here for 12 week post-op. Patient c/o pain in left ankle and tendon. Pain scale: 2/10. Patient is currently in physical therapy. Admits pain increases with activity. PCP: Dr. Edouard Madrigal     HPI: Ismael Huff, a 53 y.o.male, comes to clinic as a(n) established patient for post-op appt 12 weeks s/p left lateral ankle stabilization and peroneal tendon repair. Patient has h/o arthritis, gout, kidney stones. States that he has returned to regular shoegear and has been FWB. Admits to mild pain along the outside of his ankle along the tendon. Pain is intermittent.  Admits pain at 2/10 level and described as aching. He has continued with PT and notes improvement with ROM and pain. Notes incisions have remained healed. Denies any constitutional symptoms. No other pedal complaints at this time.    Past Medical History:   Diagnosis Date   • Ankle pain, left    • Arthritis    • Gout    • History of kidney stones    • History of pneumonia    • Tendonitis of ankle     left   • Wears dentures      Past Surgical History:   Procedure Laterality Date   • ANKLE SURGERY Left    • COLECTOMY PARTIAL / TOTAL  2011    polyps ruptured   • COLONOSCOPY     • KNEE SURGERY Left 1990s    torn cartilage repair     Family History   Problem Relation Age of Onset   • Cancer Mother    • Diabetes Mother    • Heart disease Father    • Cancer Father    • Hypertension Father    • Arthritis Father    • Stroke Father    • Hyperlipidemia Father      Social History     Socioeconomic History   • Marital status:      Spouse name: Not on file   • Number of children: Not on file   • Years of education: Not on file   • Highest education level: Not on file   Social  Needs   • Financial resource strain: Not on file   • Food insecurity - worry: Not on file   • Food insecurity - inability: Not on file   • Transportation needs - medical: Not on file   • Transportation needs - non-medical: Not on file   Occupational History   • Not on file   Tobacco Use   • Smoking status: Never Smoker   • Smokeless tobacco: Current User     Types: Snuff   • Tobacco comment: ~40 YEARS   Substance and Sexual Activity   • Alcohol use: No   • Drug use: No   • Sexual activity: Defer   Other Topics Concern   • Not on file   Social History Narrative   • Not on file     Allergies   Allergen Reactions   • Reglan [Metoclopramide] Anaphylaxis   • Allopurinol Rash   • Keflex [Cephalexin] Rash     Current Outpatient Medications   Medication Sig Dispense Refill   • PredniSONE 5 MG (21) tablet therapy pack dosepak Take 1 tablet by mouth Take As Directed. Take as directed on package instructions. 1 each 0     No current facility-administered medications for this visit.      Review of Systems   Constitutional: Negative for chills and fever.   HENT: Negative for congestion.    Respiratory: Negative for shortness of breath.    Cardiovascular: Negative for chest pain and leg swelling.   Gastrointestinal: Negative for constipation, diarrhea, nausea and vomiting.   Musculoskeletal:        Ankle pain   Skin: Negative for wound.   Neurological: Negative for numbness.       OBJECTIVE     Vitals:    12/12/18 0857   BP: 134/72   Pulse: 68   SpO2: 98%       PHYSICAL EXAM  GEN:   A&Ox3, NAD. Pt presents to clinic ambulating without assistance and wearing Casual Shoes. Accompanied by wife.     Physical Exam    NEURO:   Protective sensation intact to 10/10 sites Right foot, 10/10 site Left foot using [place] monofilament  Light touch sensation present  No Tinel's or Villeux sign.    VASC:  Skin temperature Warm to Warm proximal to distal odilon  DP pulses 2/4 Right, 2/4 Left  PT pulses 2/4 Right, 2/4 Left  CFT 3 sec  odilon  Pedal hair growth present  no edema noted odilon  Varicosities absent odilon    MUSC/SKEL:  Minimal pain to surgical sites.  Ankle appears stable.  Able to hien foot   Mild discomfort to posterior lateral ankle along peroneal tendons. No pain with supination.     DERM:  Pedal nails x10 are within normal limits of length and thickness  Webspaces are Clean, Dry, and Intact  Skin is normal in turgor and texture with no open wounds or sores appreciated.  Previous incision site at anterior medial aspect of left ankle is fully coapted with no signs of hypertrophied scar  Incisions noted to left lateral ankle. Fully healed No signs of dehiscence or infection.        RADIOLOGY/NUCLEAR:  No results found.   IMPRESSION:  1. Findings suspicious for a peroneus brevis tendon tear. Tendinopathy  of the peroneus longus tendon favored.   2. Questionable tenosynovitis involving the flexor hallux longus and  posterior tibialis tendons.  3. Osteochondral defect along the lateral talar dome.  4. Scattered edema involving the tarsal likely proximal metatarsal  bones. No discrete fracture.  5. Os trigonum with minimal edema seen adjacent to the accessory  ossicle.    LABORATORY/CULTURE RESULTS:      PATHOLOGY RESULTS:       ASSESSMENT/PLAN     Ismael was seen today for follow-up.    Diagnoses and all orders for this visit:    S/P foot surgery    Foot pain, left    Other orders  -     PredniSONE 5 MG (21) tablet therapy pack dosepak; Take 1 tablet by mouth Take As Directed. Take as directed on package instructions.      Comprehensive lower extremity examination and evaluation was performed.  Discussed findings and treatment plan including risks, benefits, and treatment options with patient in detail. Patient agreed with treatment plan.  Continue with regular shoes and ankle brace. Continue with PT. Overall patient has healed very well and continues to improve. No fruther intervention needed at this time.   Steroid pack to assist with  small amount of residual inflammation.  An After Visit Summary was printed and given to the patient at discharge, including (if requested) any available informative/educational handouts regarding diagnosis, treatment, or medications. All questions were answered to patient/family satisfaction. Should symptoms fail to improve or worsen they agree to call or return to clinic or to go to the Emergency Department. Discussed the importance of following up with any needed screening tests/labs/specialist appointments and any requested follow-up recommended by me today. Importance of maintaining follow-up discussed and patient accepts that missed appointments can delay diagnosis and potentially lead to worsening of conditions.  Return in about 6 months (around 6/12/2019)., or sooner if acute issues arise.    Lab Frequency Next Occurrence   Follow Anesthesia Guidelines / Standing Orders Once 11/09/2016   XR Ankle 3+ View Left Once 04/01/2018   XR Foot 3+ View Left Once 04/01/2018       This document has been electronically signed by Ajay Comer DPM on December 12, 2018 9:07 AM

## 2018-12-05 ENCOUNTER — HOSPITAL ENCOUNTER (OUTPATIENT)
Dept: PHYSICAL THERAPY | Facility: HOSPITAL | Age: 53
Setting detail: THERAPIES SERIES
Discharge: HOME | End: 2018-12-05

## 2018-12-05 DIAGNOSIS — Z98.890 S/P FOOT SURGERY, LEFT: Primary | ICD-10-CM

## 2018-12-05 DIAGNOSIS — M25.572 CHRONIC PAIN OF LEFT ANKLE: ICD-10-CM

## 2018-12-05 DIAGNOSIS — M95.8 OSTEOCHONDRAL DEFECT OF ANKLE: ICD-10-CM

## 2018-12-05 DIAGNOSIS — G89.29 CHRONIC PAIN OF LEFT ANKLE: ICD-10-CM

## 2018-12-05 DIAGNOSIS — S86.312D PERONEAL TENDON TEAR, LEFT, SUBSEQUENT ENCOUNTER: ICD-10-CM

## 2018-12-05 DIAGNOSIS — M25.372 ANKLE INSTABILITY, LEFT: ICD-10-CM

## 2018-12-05 PROCEDURE — 97032 APPL MODALITY 1+ESTIM EA 15: CPT

## 2018-12-05 PROCEDURE — 97110 THERAPEUTIC EXERCISES: CPT

## 2018-12-05 NOTE — THERAPY TREATMENT NOTE
Outpatient Physical Therapy Ortho Treatment Note  [facility]     Patient Name: Ismael Huff  : 1965  MRN: [id number]  Today's Date: 2018      Visit Date: 2018    Visit Dx:    ICD-10-CM ICD-9-CM   1. S/P foot surgery, left Z98.890 V45.89   2. Peroneal tendon tear, left, subsequent encounter S86.312D V58.89     844.8   3. Ankle instability, left M25.372 718.87   4. Chronic pain of left ankle M25.572 719.47    G89.29 338.29   5. Osteochondral defect of ankle M95.8 738.8       Patient Active Problem List   Diagnosis   • Osteochondral defect of ankle   • Chronic pain of left ankle   • Peroneal tendon tear, left, subsequent encounter   • Ankle instability, left        Past Medical History:   Diagnosis Date   • Ankle pain, left    • Arthritis    • Gout    • History of kidney stones    • History of pneumonia    • Tendonitis of ankle     left   • Wears dentures         Past Surgical History:   Procedure Laterality Date   • ANKLE SURGERY Left    • COLECTOMY PARTIAL / TOTAL      polyps ruptured   • COLONOSCOPY     • KNEE SURGERY Left     torn cartilage repair                       PT Assessment/Plan     Row Name 18 1513          PT Assessment    Assessment Comments  His pain was abolished today and I advised him that although I had him perform a strengthening activity for foot intrinsic strengthening that he needed to monitor his symptoms before we reinstated this in his HEP  -EC        PT Plan    PT Plan Comments  Progress to standning activities.  -EC       User Key  (r) = Recorded By, (t) = Taken By, (c) = Cosigned By    Initials Name Provider Type    EC Varun Archibald, PTA Physical Therapy Assistant          Modalities     Row Name 18 1300             Subjective Comments    Subjective Comments  He denies pain or new complaints  -EC         ELECTRICAL STIMULATION    Attended/Unattended  Attended  -EC      Location/Electrode Placement/Other  L achilles globally and L  lateral maleolus  -EC      51347 - PT E-Stim Attended (Manual) Minutes  10  -EC        User Key  (r) = Recorded By, (t) = Taken By, (c) = Cosigned By    Initials Name Provider Type    EC Varun Archibald PTA Physical Therapy Assistant          Exercises     Row Name 12/05/18 1300             Subjective Comments    Subjective Comments  He denies pain or new complaints  -EC         Subjective Pain    Able to rate subjective pain?  yes  -EC      Pre-Treatment Pain Level  0  -EC      Post-Treatment Pain Level  0  -EC         Total Minutes    37322 - PT Therapeutic Exercise Minutes  32  -EC         Exercise 1    Exercise Name 1  prone L  soleus stretches   -EC      Reps 1  3  -EC      Time 1  30 sec  -EC         Exercise 2    Exercise Name 2  standing alt gastroc/soleus stretches   -EC      Reps 2  2  -EC      Time 2  30 sec  -EC         Exercise 3    Exercise Name 3  seated DF/PF, supination/pronation, CCW/CW on BOSU blue side up  -EC      Time 3  2 min each  -EC         Exercise 4    Exercise Name 4  L pillow case scrunchies in sitting  -EC      Time 4  2 min  -EC         Exercise 5    Exercise Name 5  R LE Shuttle Press with eccentric focus 5 bands and red Dynadisk  -EC      Time 5  5 min  -EC        User Key  (r) = Recorded By, (t) = Taken By, (c) = Cosigned By    Initials Name Provider Type    Varun John PTA Physical Therapy Assistant                         PT OP Goals     Row Name 12/05/18 1301          PT Short Term Goals    STG Date to Achieve  12/03/18  -EC     STG 1  Patient will report a deacrease in pain for 1 week resultig in increased standing tolerance,   -EC     STG 1 Progress  Ongoing  -EC     STG 1 Progress Comments  no pain reported today  -EC     STG 2  Patient will demonstrate 20 degrees L hip IR PROM  -EC     STG 2 Progress  Ongoing  -EC        Long Term Goals    LTG Date to Achieve  12/19/18  -EC     LTG 1  Patient will demonstrate independence with HEP  -EC     LTG 1 Progress   Ongoing  -EC     LTG 1 Progress Comments  reviewed today  -EC     LTG 2  Patient will demonstrate 2- degreeses L ankle eversion PROM  -EC     LTG 2 Progress  Ongoing  -EC     LTG 3  Patient will demonstrate 30 degrees L ankle innversion PROM  -EC     LTG 3 Progress  Ongoing  -EC     LTG 4  Patient will demonstrate B proximal hip strength 4+/5 as measured by MMT  -EC     LTG 4 Progress  Ongoing  -EC     LTG 5  Patient will demonstrate ability to perform B single leg quarter squat with no dduction or IR of stance leg and no increased pronation of stance foot  -EC     LTG 5 Progress  Ongoing  -EC     LTG 6  Patient will demonstrate adequate dynamic and static stability of L ankle.   -EC     LTG 6 Progress  Ongoing  -EC        Time Calculation    PT Goal Re-Cert Due Date  12/19/18  -EC       User Key  (r) = Recorded By, (t) = Taken By, (c) = Cosigned By    Initials Name Provider Type    Varun John, PTA Physical Therapy Assistant                         Time Calculation:   Start Time: 1301  Stop Time: 1343  Time Calculation (min): 42 min  Total Timed Code Minutes- PT: 42 minute(s)  Therapy Suggested Charges     Code   Minutes Charges    23690 (CPT®) Hc Pt Neuromusc Re Education Ea 15 Min      93153 (CPT®) Hc Pt Ther Proc Ea 15 Min 32 2    79971 (CPT®) Hc Gait Training Ea 15 Min      08273 (CPT®) Hc Pt Therapeutic Act Ea 15 Min      68335 (CPT®) Hc Pt Manual Therapy Ea 15 Min      28410 (CPT®) Hc Pt Ther Massage- Per 15 Min      73767 (CPT®) Hc Pt Iontophoresis Ea 15 Min      60628 (CPT®) Hc Pt Elec Stim Ea-Per 15 Min 10 1    08910 (CPT®) Hc Pt Ultrasound Ea 15 Min      85956 (CPT®) Hc Pt Self Care/Mgmt/Train Ea 15 Min      22859 (CPT®) Hc Pt Prosthetic (S) Train Initial Encounter, Each 15 Min      41292 (CPT®) Hc Orthotic(S) Mgmt/Train Initial Encounter, Each 15min      71157 (CPT®) Hc Pt Aquatic Therapy Ea 15 Min      16921 (CPT®) Hc Pt Orthotic(S)/Prosthetic(S) Encounter, Each 15 Min       (CPT®) Hc Pt  Electrical Stim Unattended      Total  42 3        Therapy Charges for Today     Code Description Service Date Service Provider Modifiers Qty    90536703278 HC PT THER PROC EA 15 MIN 12/5/2018 Varun Archibald, PTA GP 2    62542988328 HC PT ELEC STIM EA-PER 15 MIN 12/5/2018 Varun Archibald, PTA GP 1                    Varun Archibald PTA  12/5/2018

## 2018-12-10 ENCOUNTER — HOSPITAL ENCOUNTER (OUTPATIENT)
Dept: PHYSICAL THERAPY | Facility: HOSPITAL | Age: 53
Setting detail: THERAPIES SERIES
Discharge: HOME | End: 2018-12-10

## 2018-12-10 PROCEDURE — 97140 MANUAL THERAPY 1/> REGIONS: CPT

## 2018-12-10 PROCEDURE — 97110 THERAPEUTIC EXERCISES: CPT

## 2018-12-10 NOTE — THERAPY TREATMENT NOTE
Outpatient Physical Therapy Ortho Treatment Note   [facility]     Patient Name: Ismael Huff  : 1965  MRN: [id number]  Today's Date: 12/10/2018      Visit Date: 12/10/2018    Visit Dx:  No diagnosis found.    Patient Active Problem List   Diagnosis   • Osteochondral defect of ankle   • Chronic pain of left ankle   • Peroneal tendon tear, left, subsequent encounter   • Ankle instability, left        Past Medical History:   Diagnosis Date   • Ankle pain, left    • Arthritis    • Gout    • History of kidney stones    • History of pneumonia    • Tendonitis of ankle     left   • Wears dentures         Past Surgical History:   Procedure Laterality Date   • ANKLE SURGERY Left    • COLECTOMY PARTIAL / TOTAL      polyps ruptured   • COLONOSCOPY     • KNEE SURGERY Left     torn cartilage repair                       PT Assessment/Plan     Row Name 12/10/18 1434          PT Assessment    Assessment Comments  He is visually dominant with his balance strategy and struggles to have equal weight distribution. If the [employer] were operational we could utilize it for an anaylsis.  -EC        PT Plan    PT Plan Comments  Progress to more standing and functional balance activities  -EC       User Key  (r) = Recorded By, (t) = Taken By, (c) = Cosigned By    Initials Name Provider Type    EC Varun Archibald, PTA Physical Therapy Assistant              Exercises     Row Name 12/10/18 1300             Subjective Comments    Subjective Comments  He reports occasional  tightness and soreness after long periods of sitting  -EC         Subjective Pain    Able to rate subjective pain?  yes  -EC      Pre-Treatment Pain Level  0  -EC      Post-Treatment Pain Level  0  -EC         Total Minutes    08908 - PT Therapeutic Exercise Minutes  33  -EC      34143 - PT Manual Therapy Minutes  10  -EC         Exercise 1    Exercise Name 1  resisted L foot inversion with red T band  -EC      Reps 1  20  -EC         Exercise 2     Exercise Name 2  resisted L DF with red T band  -EC      Reps 2  20  -EC         Exercise 3    Exercise Name 3  resisted eversion with red T band  -EC      Reps 3  20  -EC         Exercise 4    Exercise Name 4  step downs from 6 inch step to blue foam  -EC      Reps 4  20  -EC        User Key  (r) = Recorded By, (t) = Taken By, (c) = Cosigned By    Initials Name Provider Type    Varun John PTA Physical Therapy Assistant                   Balance Exercises (last 24 hours)      Balance Exercises     Row Name 12/10/18 1400             Midline Orientation    Activity  Standing;Eyes open;Eyes closed theradisks  -EC         Stand with Feet Together    Activity  Eyes open;Eyes closed theradisks, blue foam   -EC      Time (seconds)  30  -EC          Tandem    Activity  Eyes open;Eyes closed theradisks, blue foam  -EC      Time (seconds)  30 10 sec EC   -EC        User Key  (r) = Recorded By, (t) = Taken By, (c) = Cosigned By    Initials Name Provider Type    Varun John PTA Physical Therapy Assistant              Manual Rx (last 36 hours)      Manual Treatments     Row Name 12/10/18 1300             Total Minutes    76392 - PT Manual Therapy Minutes  10  -EC         Manual Rx 1    Manual Rx 1 Location  L TCJ   -EC      Manual Rx 1 Type  A/P and M/L glides  -EC      Manual Rx 1 Grade  2 and 3  -EC      Manual Rx 1 Duration  10  -EC        User Key  (r) = Recorded By, (t) = Taken By, (c) = Cosigned By    Initials Name Provider Type    Varun John PTA Physical Therapy Assistant          PT OP Goals     Row Name 12/10/18 1348          PT Short Term Goals    STG Date to Achieve  12/03/18  -EC     STG 1  Patient will report a deacrease in pain for 1 week resultig in increased standing tolerance,   -EC     STG 1 Progress  Ongoing  -EC     STG 2  Patient will demonstrate 20 degrees L hip IR PROM  -EC     STG 2 Progress  Ongoing  -EC        Long Term Goals    LTG Date to Achieve  12/19/18  -EC      LTG 1  Patient will demonstrate independence with HEP  -EC     LTG 1 Progress  Ongoing  -EC     LTG 2  Patient will demonstrate 2- degreeses L ankle eversion PROM  -EC     LTG 2 Progress  Ongoing  -EC     LTG 3  Patient will demonstrate 30 degrees L ankle innversion PROM  -EC     LTG 3 Progress  Ongoing  -EC     LTG 3 Progress Comments  10 degrees today prior to stretching and mobilizations  -EC     LTG 4  Patient will demonstrate B proximal hip strength 4+/5 as measured by MMT  -EC     LTG 4 Progress  Ongoing  -EC     LTG 5  Patient will demonstrate ability to perform B single leg quarter squat with no dduction or IR of stance leg and no increased pronation of stance foot  -EC     LTG 5 Progress  Ongoing  -EC     LTG 6  Patient will demonstrate adequate dynamic and static stability of L ankle.   -EC     LTG 6 Progress  Ongoing  -EC        Time Calculation    PT Goal Re-Cert Due Date  12/19/18  -EC       User Key  (r) = Recorded By, (t) = Taken By, (c) = Cosigned By    Initials Name Provider Type    Varun John, PTA Physical Therapy Assistant          Therapy Education  Education Details: pillowcase scrunchies x 2 x 2 min and resisted ankle AROM DF, inv/ev with red T band x 20 on non therapy days  Given: HEP  Program: New  How Provided: Verbal, Demonstration  Provided to: Patient  Level of Understanding: Verbalized, Demonstrated              Time Calculation:   Start Time: 1347  Stop Time: 1430  Time Calculation (min): 43 min  Total Timed Code Minutes- PT: 43 minute(s)  Therapy Suggested Charges     Code   Minutes Charges    72553 (CPT®) Hc Pt Neuromusc Re Education Ea 15 Min      79886 (CPT®) Hc Pt Ther Proc Ea 15 Min 33 2    62412 (CPT®) Hc Gait Training Ea 15 Min      36925 (CPT®) Hc Pt Therapeutic Act Ea 15 Min      85173 (CPT®) Hc Pt Manual Therapy Ea 15 Min 10 1    18519 (CPT®) Hc Pt Ther Massage- Per 15 Min      38728 (CPT®) Hc Pt Iontophoresis Ea 15 Min      77933 (CPT®) Hc Pt Elec Stim Ea-Per 15  Min      78767 (CPT®) Hc Pt Ultrasound Ea 15 Min      76527 (CPT®) Hc Pt Self Care/Mgmt/Train Ea 15 Min      73841 (CPT®) Hc Pt Prosthetic (S) Train Initial Encounter, Each 15 Min      58656 (CPT®) Hc Orthotic(S) Mgmt/Train Initial Encounter, Each 15min      57187 (CPT®) Hc Pt Aquatic Therapy Ea 15 Min      18533 (CPT®) Hc Pt Orthotic(S)/Prosthetic(S) Encounter, Each 15 Min       (CPT®) Hc Pt Electrical Stim Unattended      Total  43 3        Therapy Charges for Today     Code Description Service Date Service Provider Modifiers Qty    42156406470 HC PT MANUAL THERAPY EA 15 MIN 12/10/2018 Varun Archibald, PTA  1    70982143237 HC PT THER PROC EA 15 MIN 12/10/2018 Varun Archibald, PTA  2                    Varun Archibald, PTA  12/10/2018

## 2018-12-11 ENCOUNTER — TELEPHONE (OUTPATIENT)
Dept: VASCULAR SURGERY | Facility: CLINIC | Age: 53
End: 2018-12-11

## 2018-12-12 ENCOUNTER — OFFICE VISIT (OUTPATIENT)
Dept: PODIATRY | Facility: CLINIC | Age: 53
End: 2018-12-12

## 2018-12-12 ENCOUNTER — TELEPHONE (OUTPATIENT)
Dept: VASCULAR SURGERY | Facility: CLINIC | Age: 53
End: 2018-12-12

## 2018-12-12 ENCOUNTER — HOSPITAL ENCOUNTER (OUTPATIENT)
Dept: PHYSICAL THERAPY | Facility: HOSPITAL | Age: 53
Setting detail: THERAPIES SERIES
Discharge: HOME | End: 2018-12-12

## 2018-12-12 VITALS
HEIGHT: 70 IN | DIASTOLIC BLOOD PRESSURE: 72 MMHG | HEART RATE: 68 BPM | SYSTOLIC BLOOD PRESSURE: 134 MMHG | OXYGEN SATURATION: 98 % | WEIGHT: 209 LBS | BODY MASS INDEX: 29.92 KG/M2

## 2018-12-12 DIAGNOSIS — G89.29 CHRONIC PAIN OF LEFT ANKLE: ICD-10-CM

## 2018-12-12 DIAGNOSIS — M79.672 FOOT PAIN, LEFT: ICD-10-CM

## 2018-12-12 DIAGNOSIS — Z98.890 S/P FOOT SURGERY, LEFT: Primary | ICD-10-CM

## 2018-12-12 DIAGNOSIS — M95.8 OSTEOCHONDRAL DEFECT OF ANKLE: ICD-10-CM

## 2018-12-12 DIAGNOSIS — S86.312D PERONEAL TENDON TEAR, LEFT, SUBSEQUENT ENCOUNTER: ICD-10-CM

## 2018-12-12 DIAGNOSIS — Z98.890 S/P FOOT SURGERY: Primary | ICD-10-CM

## 2018-12-12 DIAGNOSIS — M25.372 ANKLE INSTABILITY, LEFT: ICD-10-CM

## 2018-12-12 DIAGNOSIS — M25.572 CHRONIC PAIN OF LEFT ANKLE: ICD-10-CM

## 2018-12-12 PROCEDURE — 97110 THERAPEUTIC EXERCISES: CPT

## 2018-12-12 PROCEDURE — 99024 POSTOP FOLLOW-UP VISIT: CPT | Performed by: PODIATRIST

## 2018-12-12 PROCEDURE — 97140 MANUAL THERAPY 1/> REGIONS: CPT

## 2018-12-12 RX ORDER — PREDNISONE 5 MG/1
1 TABLET ORAL TAKE AS DIRECTED
Qty: 1 EACH | Refills: 0 | Status: SHIPPED | OUTPATIENT
Start: 2018-12-12 | End: 2019-03-20

## 2018-12-12 NOTE — TELEPHONE ENCOUNTER
Spoke with Gene at [employer] who advised that patient is active since 3/1/2018 to present.  Call reference number [id number].

## 2018-12-12 NOTE — THERAPY TREATMENT NOTE
Outpatient Physical Therapy Ortho Treatment Note  [facility]     Patient Name: Ismael Huff  : 1965  MRN: [id number]  Today's Date: 2018      Visit Date: 2018    Visit Dx:    ICD-10-CM ICD-9-CM   1. S/P foot surgery, left Z98.890 V45.89   2. Peroneal tendon tear, left, subsequent encounter S86.312D V58.89     844.8   3. Ankle instability, left M25.372 718.87   4. Chronic pain of left ankle M25.572 719.47    G89.29 338.29   5. Osteochondral defect of ankle M95.8 738.8       Patient Active Problem List   Diagnosis   • Osteochondral defect of ankle   • Chronic pain of left ankle   • Peroneal tendon tear, left, subsequent encounter   • Ankle instability, left        Past Medical History:   Diagnosis Date   • Ankle pain, left    • Arthritis    • Gout    • History of kidney stones    • History of pneumonia    • Tendonitis of ankle     left   • Wears dentures         Past Surgical History:   Procedure Laterality Date   • ANKLE SURGERY Left    • COLECTOMY PARTIAL / TOTAL      polyps ruptured   • COLONOSCOPY     • KNEE SURGERY Left     torn cartilage repair                       PT Assessment/Plan     Row Name 18 1619          PT Assessment    Assessment Comments  He has four more sessions left and I advised him today that he neede to monitor his symptoms closely and give thought and consideration to what activities may be challenging and be difficult due to the ankle and we would address these.  -EC        PT Plan    PT Plan Comments  Progress functional standing therex and balance activities.  -EC       User Key  (r) = Recorded By, (t) = Taken By, (c) = Cosigned By    Initials Name Provider Type    EC Varun Archibald PTA Physical Therapy Assistant              Exercises     Row Name 18 1400 18 1300          Subjective Comments    Subjective Comments  He denies pain r new complaints  -EC  --        Subjective Pain    Able to rate subjective pain?  yes  -EC  --      Pre-Treatment Pain Level  0  -EC  --     Post-Treatment Pain Level  0  -EC  --        Total Minutes    55996 - PT Therapeutic Exercise Minutes  23  -EC  --     34767 - PT Manual Therapy Minutes  --  15  -EC        Exercise 1    Exercise Name 1  seated PF/PDF, pronation/supination, CW, and CCW with BAPS L2 and #1 AP/PL  -EC  --     Time 1  2 min each  -EC  --        Exercise 2    Exercise Name 2  L LE eccentric heel raises with 2 inch step  -EC  --     Cueing 2  Demo  -EC  --     Reps 2  20  -EC  --        Exercise 3    Exercise Name 3  short foot walking x 40 ft   -EC  --     Reps 3  2  -EC  --       User Key  (r) = Recorded By, (t) = Taken By, (c) = Cosigned By    Initials Name Provider Type    Varun John, PTA Physical Therapy Assistant                        Manual Rx (last 36 hours)      Manual Treatments     Row Name 12/12/18 1300             Total Minutes    47484 - PT Manual Therapy Minutes  15  -EC         Manual Rx 1    Manual Rx 1 Location  L TCJ   -EC      Manual Rx 1 Type  A/P and M/L glides  -EC      Manual Rx 1 Grade  2 and 3  -EC      Manual Rx 1 Duration  10  -EC         Manual Rx 3    Manual Rx 3 Location  L gastroc  -EC      Manual Rx 3 Type  IASTM with EDGE tool  -EC      Manual Rx 3 Duration  5  -EC        User Key  (r) = Recorded By, (t) = Taken By, (c) = Cosigned By    Initials Name Provider Type    EC Varun Archibald, PTA Physical Therapy Assistant          PT OP Goals     Row Name 12/12/18 1355          PT Short Term Goals    STG Date to Achieve  12/03/18  -EC     STG 1  Patient will report a deacrease in pain for 1 week resultig in increased standing tolerance,   -EC     STG 1 Progress  Ongoing  -EC     STG 2  Patient will demonstrate 20 degrees L hip IR PROM  -EC     STG 2 Progress  Ongoing  -EC        Long Term Goals    LTG Date to Achieve  12/19/18  -EC     LTG 1  Patient will demonstrate independence with HEP  -EC     LTG 1 Progress  Ongoing  -EC     LTG 1 Progress Comments   reinforced today  -EC     LTG 2  Patient will demonstrate 2- degreeses L ankle eversion PROM  -EC     LTG 2 Progress  Ongoing  -EC     LTG 3  Patient will demonstrate 30 degrees L ankle innversion PROM  -EC     LTG 3 Progress  Ongoing  -EC     LTG 4  Patient will demonstrate B proximal hip strength 4+/5 as measured by MMT  -EC     LTG 4 Progress  Ongoing  -EC     LTG 5  Patient will demonstrate ability to perform B single leg quarter squat with no dduction or IR of stance leg and no increased pronation of stance foot  -EC     LTG 5 Progress  Ongoing  -EC     LTG 6  Patient will demonstrate adequate dynamic and static stability of L ankle.   -EC     LTG 6 Progress  Ongoing  -EC        Time Calculation    PT Goal Re-Cert Due Date  12/19/18  -EC       User Key  (r) = Recorded By, (t) = Taken By, (c) = Cosigned By    Initials Name Provider Type    Varun John, PTA Physical Therapy Assistant          Therapy Education  Given: HEP  Program: Reinforced  How Provided: Verbal  Provided to: Patient  Level of Understanding: Verbalized              Time Calculation:   Start Time: 1353  Stop Time: 1431  Time Calculation (min): 38 min  Total Timed Code Minutes- PT: 38 minute(s)  Therapy Suggested Charges     Code   Minutes Charges    50727 (CPT®) Hc Pt Neuromusc Re Education Ea 15 Min      33571 (CPT®) Hc Pt Ther Proc Ea 15 Min 23 2    01820 (CPT®) Hc Gait Training Ea 15 Min      70315 (CPT®) Hc Pt Therapeutic Act Ea 15 Min      44039 (CPT®) Hc Pt Manual Therapy Ea 15 Min 15 1    25238 (CPT®) Hc Pt Ther Massage- Per 15 Min      52665 (CPT®) Hc Pt Iontophoresis Ea 15 Min      59733 (CPT®) Hc Pt Elec Stim Ea-Per 15 Min      61613 (CPT®) Hc Pt Ultrasound Ea 15 Min      87144 (CPT®) Hc Pt Self Care/Mgmt/Train Ea 15 Min      98132 (CPT®) Hc Pt Prosthetic (S) Train Initial Encounter, Each 15 Min      89979 (CPT®) Hc Orthotic(S) Mgmt/Train Initial Encounter, Each 15min      59417 (CPT®) Hc Pt Aquatic Therapy Ea 15 Min       60527 (CPT®) Hc Pt Orthotic(S)/Prosthetic(S) Encounter, Each 15 Min       (CPT®) Hc Pt Electrical Stim Unattended      Total  38 3        Therapy Charges for Today     Code Description Service Date Service Provider Modifiers Qty    30507107837 HC PT THER PROC EA 15 MIN 12/12/2018 Varun Archibald, PTA GP 2    99860143378 HC PT MANUAL THERAPY EA 15 MIN 12/12/2018 Varun Archibald, PTA GP 1                    Varun Archibald PTA  12/12/2018

## 2018-12-17 ENCOUNTER — HOSPITAL ENCOUNTER (OUTPATIENT)
Dept: PHYSICAL THERAPY | Facility: HOSPITAL | Age: 53
Setting detail: THERAPIES SERIES
Discharge: HOME | End: 2018-12-17

## 2018-12-17 DIAGNOSIS — M95.8 OSTEOCHONDRAL DEFECT OF ANKLE: ICD-10-CM

## 2018-12-17 DIAGNOSIS — G89.29 CHRONIC PAIN OF LEFT ANKLE: ICD-10-CM

## 2018-12-17 DIAGNOSIS — Z98.890 S/P FOOT SURGERY, LEFT: Primary | ICD-10-CM

## 2018-12-17 DIAGNOSIS — M25.572 CHRONIC PAIN OF LEFT ANKLE: ICD-10-CM

## 2018-12-17 DIAGNOSIS — S86.312D PERONEAL TENDON TEAR, LEFT, SUBSEQUENT ENCOUNTER: ICD-10-CM

## 2018-12-17 DIAGNOSIS — M25.372 ANKLE INSTABILITY, LEFT: ICD-10-CM

## 2018-12-17 PROCEDURE — 97110 THERAPEUTIC EXERCISES: CPT

## 2018-12-17 NOTE — THERAPY PROGRESS REPORT/RE-CERT
Outpatient Physical Therapy Ortho Progress Note  [facility]     Patient Name: Ismael Huff  : 1965  MRN: [id number]  Today's Date: 2018      Visit Date: 2018    Visit Dx:    ICD-10-CM ICD-9-CM   1. S/P foot surgery, left Z98.890 V45.89   2. Peroneal tendon tear, left, subsequent encounter S86.312D V58.89     844.8   3. Ankle instability, left M25.372 718.87   4. Chronic pain of left ankle M25.572 719.47    G89.29 338.29   5. Osteochondral defect of ankle M95.8 738.8       Patient Active Problem List   Diagnosis   • Osteochondral defect of ankle   • Chronic pain of left ankle   • Peroneal tendon tear, left, subsequent encounter   • Ankle instability, left        Past Medical History:   Diagnosis Date   • Ankle pain, left    • Arthritis    • Gout    • History of kidney stones    • History of pneumonia    • Tendonitis of ankle     left   • Wears dentures         Past Surgical History:   Procedure Laterality Date   • ANKLE SURGERY Left    • COLECTOMY PARTIAL / TOTAL      polyps ruptured   • COLONOSCOPY     • KNEE SURGERY Left     torn cartilage repair                       PT Assessment/Plan     Row Name 18 1414          PT Assessment    Assessment Comments  He has met three of his goals thus far and progressed towards all others. Today was the first time we simulated work related tasks and we will utilize his long term response post today's session to martha his ready to return to work status.  -EC        PT Plan    PT Plan Comments  Progress work related simulated activities.  -EC       User Key  (r) = Recorded By, (t) = Taken By, (c) = Cosigned By    Initials Name Provider Type    EC Varun Archibald PTA Physical Therapy Assistant              Exercises     Row Name 18 1400             Subjective Comments    Subjective Comments  He denies pain reports will be returning to work on 1/3/19  -EC         Subjective Pain    Able to rate subjective pain?  yes  -EC       Pre-Treatment Pain Level  0  -EC      Post-Treatment Pain Level  0  -EC         Total Minutes    91828 - PT Therapeutic Exercise Minutes  43  -EC         Exercise 1    Exercise Name 1  push pull weighted sled 90 ft each #20, 50,, & 70  -EC         Exercise 2    Exercise Name 2  weighted box lifts and carry x 90 ft #20 and 40  -EC         Exercise 3    Exercise Name 3  reviewed goals for progress note (see goals section)  -EC         Exercise 4    Exercise Name 4  L LE Shuttle Prress with yellow Dynadisk and 5 bands  -EC      Time 4  5 min  -EC         Exercise 5    Exercise Name 5  walking on [employer] 1.5 m.p.h. 0 incline 3 min, 3% incline 4 min, and 5% incline 3 min  -EC        User Key  (r) = Recorded By, (t) = Taken By, (c) = Cosigned By    Initials Name Provider Type    EC Varun Archibald, PTA Physical Therapy Assistant                         PT OP Goals     Row Name 12/17/18 1354          PT Short Term Goals    STG Date to Achieve  12/03/18  -EC     STG 1  Patient will report a deacrease in pain for 1 week resultig in increased standing tolerance,   -EC     STG 1 Progress  Met  -EC     STG 2  Patient will demonstrate 20 degrees L hip IR PROM  -EC     STG 2 Progress  Met  -EC     STG 2 Progress Comments  28 degrees   -EC        Long Term Goals    LTG Date to Achieve  12/19/18  -EC     LTG 1  Patient will demonstrate independence with HEP  -EC     LTG 1 Progress  Ongoing  -EC     LTG 1 Progress Comments  verbalized compliance today  -EC     LTG 2  Patient will demonstrate 2- degreeses L ankle eversion PROM  -EC     LTG 2 Progress  Ongoing  -EC     LTG 2 Progress Comments  8 degrees AROM  -EC     LTG 3  Patient will demonstrate 30 degrees L ankle innversion PROM  -EC     LTG 3 Progress  Ongoing  -EC     LTG 3 Progress Comments  28 degrees  -EC     LTG 4  Patient will demonstrate B proximal hip strength 4+/5 as measured by MMT  -EC     LTG 4 Progress  Met  -EC     LTG 4 Progress Comments  B hip abduction  -EC      LTG 5  Patient will demonstrate ability to perform B single leg quarter squat with no dduction or IR of stance leg and no increased pronation of stance foot  -EC     LTG 5 Progress  Ongoing  -EC     LTG 5 Progress Comments  25% of depth of R LE  -EC     LTG 6  Patient will demonstrate adequate dynamic and static stability of L ankle.   -EC     LTG 6 Progress  Ongoing  -EC     LTG 6 Progress Comments  addressed with SuttlePress /DynaDisk activty  -EC        Time Calculation    PT Goal Re-Cert Due Date  01/16/19  -EC       User Key  (r) = Recorded By, (t) = Taken By, (c) = Cosigned By    Initials Name Provider Type    Varun John, PTA Physical Therapy Assistant          Therapy Education  Given: Symptoms/condition management  How Provided: Verbal  Provided to: Patient  Level of Understanding: Verbalized              Time Calculation:   Start Time: 1330  Stop Time: 1413  Time Calculation (min): 43 min  Total Timed Code Minutes- PT: 43 minute(s)  Therapy Suggested Charges     Code   Minutes Charges    66298 (CPT®) Hc Pt Neuromusc Re Education Ea 15 Min      61692 (CPT®) Hc Pt Ther Proc Ea 15 Min 43 3    19653 (CPT®) Hc Gait Training Ea 15 Min      59953 (CPT®) Hc Pt Therapeutic Act Ea 15 Min      28258 (CPT®) Hc Pt Manual Therapy Ea 15 Min      72612 (CPT®) Hc Pt Ther Massage- Per 15 Min      78141 (CPT®) Hc Pt Iontophoresis Ea 15 Min      65786 (CPT®) Hc Pt Elec Stim Ea-Per 15 Min      85916 (CPT®) Hc Pt Ultrasound Ea 15 Min      21213 (CPT®) Hc Pt Self Care/Mgmt/Train Ea 15 Min      14150 (CPT®) Hc Pt Prosthetic (S) Train Initial Encounter, Each 15 Min      38538 (CPT®) Hc Orthotic(S) Mgmt/Train Initial Encounter, Each 15min      88972 (CPT®) Hc Pt Aquatic Therapy Ea 15 Min      69090 (CPT®) Hc Pt Orthotic(S)/Prosthetic(S) Encounter, Each 15 Min       (CPT®) Hc Pt Electrical Stim Unattended      Total  43 3        Therapy Charges for Today     Code Description Service Date Service Provider Modifiers  Qty    62817597513  PT THER PROC EA 15 MIN 12/17/2018 Varun Archibald, PTA GP 3                    Varun Archibald, VIC  12/17/2018

## 2018-12-19 ENCOUNTER — APPOINTMENT (OUTPATIENT)
Dept: PHYSICAL THERAPY | Facility: HOSPITAL | Age: 53
End: 2018-12-19

## 2018-12-20 ENCOUNTER — HOSPITAL ENCOUNTER (OUTPATIENT)
Dept: PHYSICAL THERAPY | Facility: HOSPITAL | Age: 53
Setting detail: THERAPIES SERIES
Discharge: HOME | End: 2018-12-20

## 2018-12-20 DIAGNOSIS — M95.8 OSTEOCHONDRAL DEFECT OF ANKLE: ICD-10-CM

## 2018-12-20 DIAGNOSIS — S86.312D PERONEAL TENDON TEAR, LEFT, SUBSEQUENT ENCOUNTER: ICD-10-CM

## 2018-12-20 DIAGNOSIS — M25.372 ANKLE INSTABILITY, LEFT: ICD-10-CM

## 2018-12-20 DIAGNOSIS — Z98.890 S/P FOOT SURGERY, LEFT: Primary | ICD-10-CM

## 2018-12-20 DIAGNOSIS — M25.572 CHRONIC PAIN OF LEFT ANKLE: ICD-10-CM

## 2018-12-20 DIAGNOSIS — G89.29 CHRONIC PAIN OF LEFT ANKLE: ICD-10-CM

## 2018-12-20 PROCEDURE — 97110 THERAPEUTIC EXERCISES: CPT

## 2018-12-20 NOTE — THERAPY TREATMENT NOTE
Outpatient Physical Therapy Ortho Treatment Note   [facility]     Patient Name: Ismael Huff  : 1965  MRN: [id number]  Today's Date: 2018      Visit Date: 2018    Visit Dx:    ICD-10-CM ICD-9-CM   1. S/P foot surgery, left Z98.890 V45.89   2. Peroneal tendon tear, left, subsequent encounter S86.312D V58.89     844.8   3. Ankle instability, left M25.372 718.87   4. Chronic pain of left ankle M25.572 719.47    G89.29 338.29   5. Osteochondral defect of ankle M95.8 738.8       Patient Active Problem List   Diagnosis   • Osteochondral defect of ankle   • Chronic pain of left ankle   • Peroneal tendon tear, left, subsequent encounter   • Ankle instability, left        Past Medical History:   Diagnosis Date   • Ankle pain, left    • Arthritis    • Gout    • History of kidney stones    • History of pneumonia    • Tendonitis of ankle     left   • Wears dentures         Past Surgical History:   Procedure Laterality Date   • ANKLE LIGAMENT RECONSTRUCTION Left 2018    Procedure: Lateral Ankle Stabilization, Peroneal Tendon Repair - Left;  Surgeon: Ajay Comer DPM;  Location:  [facility] OR;  Service: Podiatry   • ANKLE SURGERY Left    • COLECTOMY PARTIAL / TOTAL  2011    polyps ruptured   • COLONOSCOPY     • KNEE SURGERY Left 1990s    torn cartilage repair   • LEG EXCISION LESION/CYST Left 2016    Procedure: ANKLE LOOSE BODY EXCISION/REMOVAL, LEFT;  Surgeon: Ajay Comer DPM;  Location:  [facility] OR;  Service:                        PT Assessment/Plan     Row Name 18 1345          PT Assessment    Assessment Comments  Based on his performance today and his post treatment response after the last session I believe he is ready to return to work at this time; however, in the event returning to work full duty flares his symptoms we will leave his POC open ended  -EC        PT Plan    PT Plan Comments  D/C if not contacted after 30 days.  -EC       User Key  (r) = Recorded By, (t) =  Taken By, (c) = Cosigned By    Initials Name Provider Type    Varun John PTA Physical Therapy Assistant              Exercises     Row Name 12/20/18 1300             Subjective Comments    Subjective Comments  He denies pain and no adverse issues since last session  -EC         Subjective Pain    Able to rate subjective pain?  yes  -EC      Pre-Treatment Pain Level  0  -EC      Post-Treatment Pain Level  0  -EC         Total Minutes    74277 - PT Therapeutic Exercise Minutes  42  -EC         Exercise 1    Exercise Name 1  Unicam seat #6 res 5  -EC      Time 1  6 min  -EC         Exercise 2    Exercise Name 2  Cybex suitcase squats with #5  -EC      Reps 2  20 each side  -EC         Exercise 3    Exercise Name 3  sumo squats with #15 ball   -EC      Reps 3  20  -EC         Exercise 4    Exercise Name 4  push weigted sled x 180 ft  -EC      Additional Comments  #70 then #90  -EC         Exercise 5    Exercise Name 5  pull weighted sled x90 ft  -EC      Reps 5  2  -EC      Additional Comments  #90  -EC         Exercise 6    Exercise Name 6  walking on treadmill 1.5 m.p.h. with incline   -EC      Time 6  12 min  -EC      Additional Comments  increased incline from 3% to 6 % at the 6 min codie  -EC        User Key  (r) = Recorded By, (t) = Taken By, (c) = Cosigned By    Initials Name Provider Type    Varun John PTA Physical Therapy Assistant                         PT OP Goals     Row Name 12/20/18 1330          Time Calculation    PT Goal Re-Cert Due Date  01/16/19  -EC       User Key  (r) = Recorded By, (t) = Taken By, (c) = Cosigned By    Initials Name Provider Type    Varun John PTA Physical Therapy Assistant                         Time Calculation:   Start Time: 1330  Stop Time: 1412  Time Calculation (min): 42 min  Total Timed Code Minutes- PT: 42 minute(s)  Therapy Suggested Charges     Code   Minutes Charges    11327 (CPT®) Hc Pt Neuromusc Re Education Ea 15 Min      93218 (CPT®)  Hc Pt Ther Proc Ea 15 Min 42 3    46300 (CPT®) Hc Gait Training Ea 15 Min      78723 (CPT®) Hc Pt Therapeutic Act Ea 15 Min      81031 (CPT®) Hc Pt Manual Therapy Ea 15 Min      43790 (CPT®) Hc Pt Ther Massage- Per 15 Min      58112 (CPT®) Hc Pt Iontophoresis Ea 15 Min      16755 (CPT®) Hc Pt Elec Stim Ea-Per 15 Min      52202 (CPT®) Hc Pt Ultrasound Ea 15 Min      78718 (CPT®) Hc Pt Self Care/Mgmt/Train Ea 15 Min      55072 (CPT®) Hc Pt Prosthetic (S) Train Initial Encounter, Each 15 Min      85342 (CPT®) Hc Orthotic(S) Mgmt/Train Initial Encounter, Each 15min      74314 (CPT®) Hc Pt Aquatic Therapy Ea 15 Min      78186 (CPT®) Hc Pt Orthotic(S)/Prosthetic(S) Encounter, Each 15 Min       (CPT®) Hc Pt Electrical Stim Unattended      Total  42 3        Therapy Charges for Today     Code Description Service Date Service Provider Modifiers Qty    38024304049 HC PT THER PROC EA 15 MIN 12/20/2018 Varun Archibald, PTA GP 3                    Varun Archibald, PTA  12/20/2018

## 2018-12-26 ENCOUNTER — APPOINTMENT (OUTPATIENT)
Dept: PHYSICAL THERAPY | Facility: HOSPITAL | Age: 53
End: 2018-12-26

## 2018-12-31 ENCOUNTER — APPOINTMENT (OUTPATIENT)
Dept: PHYSICAL THERAPY | Facility: HOSPITAL | Age: 53
End: 2018-12-31

## 2019-01-02 ENCOUNTER — TELEPHONE (OUTPATIENT)
Dept: PODIATRY | Facility: CLINIC | Age: 54
End: 2019-01-02

## 2019-01-02 ENCOUNTER — DOCUMENTATION (OUTPATIENT)
Dept: PODIATRY | Facility: CLINIC | Age: 54
End: 2019-01-02

## 2019-01-02 ENCOUNTER — APPOINTMENT (OUTPATIENT)
Dept: PHYSICAL THERAPY | Facility: HOSPITAL | Age: 54
End: 2019-01-02

## 2019-01-02 NOTE — TELEPHONE ENCOUNTER
Patient called stating that he will need a return to work with no restrictions letter for his employer. Informed patient that I would have the letter ready for him and waiting at the . Patient gave verbal understanding of what was discussed at this time.

## 2019-01-17 ENCOUNTER — DOCUMENTATION (OUTPATIENT)
Dept: PHYSICAL THERAPY | Facility: HOSPITAL | Age: 54
End: 2019-01-17

## 2019-01-17 DIAGNOSIS — S86.312D PERONEAL TENDON TEAR, LEFT, SUBSEQUENT ENCOUNTER: ICD-10-CM

## 2019-01-17 DIAGNOSIS — G89.29 CHRONIC PAIN OF LEFT ANKLE: ICD-10-CM

## 2019-01-17 DIAGNOSIS — M25.372 ANKLE INSTABILITY, LEFT: ICD-10-CM

## 2019-01-17 DIAGNOSIS — M25.572 CHRONIC PAIN OF LEFT ANKLE: ICD-10-CM

## 2019-01-17 DIAGNOSIS — M95.8 OSTEOCHONDRAL DEFECT OF ANKLE: ICD-10-CM

## 2019-01-17 DIAGNOSIS — Z98.890 S/P FOOT SURGERY, LEFT: Primary | ICD-10-CM

## 2019-01-17 NOTE — THERAPY DISCHARGE NOTE
Outpatient Physical Therapy Discharge Summary         Patient Name: Ismael Huff  : 1965  MRN: [id number]    Today's Date: 2019    Visit Dx:    ICD-10-CM ICD-9-CM   1. S/P foot surgery, left Z98.890 V45.89   2. Peroneal tendon tear, left, subsequent encounter S86.312D V58.89     844.8   3. Ankle instability, left M25.372 718.87   4. Chronic pain of left ankle M25.572 719.47    G89.29 338.29   5. Osteochondral defect of ankle M95.8 738.8       PT OP Goals     Row Name 19 1400          PT Short Term Goals    STG Date to Achieve  18  -EC     STG 1  Patient will report a deacrease in pain for 1 week resultig in increased standing tolerance,   -EC     STG 1 Progress  Met  -EC     STG 2  Patient will demonstrate 20 degrees L hip IR PROM  -EC     STG 2 Progress  Met  -EC        Long Term Goals    LTG Date to Achieve  18  -EC     LTG 1  Patient will demonstrate independence with HEP  -EC     LTG 1 Progress  Met  -EC     LTG 2  Patient will demonstrate 2- degreeses L ankle eversion PROM  -EC     LTG 2 Progress  Met  -EC     LTG 3  Patient will demonstrate 30 degrees L ankle innversion PROM  -EC     LTG 3 Progress  Not Met  -EC     LTG 4  Patient will demonstrate B proximal hip strength 4+/5 as measured by MMT  -EC     LTG 4 Progress  Met  -EC     LTG 5  Patient will demonstrate ability to perform B single leg quarter squat with no dduction or IR of stance leg and no increased pronation of stance foot  -EC     LTG 5 Progress  Partially Met  -EC     LTG 6  Patient will demonstrate adequate dynamic and static stability of L ankle.   -EC     LTG 6 Progress  Met  -EC       User Key  (r) = Recorded By, (t) = Taken By, (c) = Cosigned By    Initials Name Provider Type    Varun John, PTA Physical Therapy Assistant          OP PT Discharge Summary  Date of Discharge: 19  Reason for Discharge: All goals achieved  Outcomes Achieved: Able to achieve all goals within established  timeline  Discharge Destination: Home with home program  Discharge Instructions/Additional Comments: We have been working with this patient to improve his ankle stability, ROM and activity tolerance post peroneal tendon repair. His symptoms were much improved and he had achieved a majority of his goals before returning to full duty work status and has been able to return to work without any major issues.      Time Calculation:        Therapy Suggested Charges     Code   Minutes Charges    None                       Varun Archibald, PTA  1/17/2019

## 2019-03-13 ENCOUNTER — TELEPHONE (OUTPATIENT)
Dept: PODIATRY | Facility: CLINIC | Age: 54
End: 2019-03-13

## 2019-03-13 NOTE — TELEPHONE ENCOUNTER
Pt called requesting appointment sooner than June. Pt had foot surgery in Sept and his having some pain that is concerning.  I have scheduled him to be seen March 20th @ 1:45. Pt voiced understanding appt info. Dr. Comer gave verbal ok to have pt come be seen DN

## 2019-03-19 ENCOUNTER — TELEPHONE (OUTPATIENT)
Dept: PODIATRY | Facility: CLINIC | Age: 54
End: 2019-03-19

## 2019-03-19 NOTE — PROGRESS NOTES
[facility] - PODIATRY    Today's Date: 03/20/19    Patient Name: Ismael Huff  MRN: [id number]  CSN: [id number]  PCP: Edouard Madrigal DO  Referring Provider: No ref. provider found    SUBJECTIVE     Chief Complaint   Patient presents with   • Follow-up     pain in left ankle and foot, pt stated since starting back at work in the afternoon the pain is worse-pt is standing on concrete all day at work pain scale 8/10  pt is wearing his brace- Dr. Madrigal PCP      HPI: Ismael Huff, a 53 y.o.male, comes to clinic as a(n) established patient complaining of ankle pain. He is 6 months post op lateral ankle stabilization and peroneal repair. Patient has h/o arthritis, gout, kidney stones. States that he was doing real good post-op and into physical therapy. When released from PT he had minimal pain. It wasn't until about a month into returning to work that he started to have pain that has slowly gotten worse. Denies new injury. Uses ankle brace daily. Does not use ice or NSAIDs.  Admits pain at 8/10 level and described as aching, sharp and tender. Notes incisions have remained healed. Denies any constitutional symptoms. No other pedal complaints at this time.    Past Medical History:   Diagnosis Date   • Ankle pain, left    • Arthritis    • Gout    • History of kidney stones    • History of pneumonia    • Tendonitis of ankle     left   • Wears dentures      Past Surgical History:   Procedure Laterality Date   • ANKLE LIGAMENT RECONSTRUCTION Left 9/19/2018    Procedure: Lateral Ankle Stabilization, Peroneal Tendon Repair - Left;  Surgeon: Ajay Comer DPM;  Location: [facility] OR;  Service: Podiatry   • ANKLE SURGERY Left    • COLECTOMY PARTIAL / TOTAL  2011    polyps ruptured   • COLONOSCOPY     • KNEE SURGERY Left 1990s    torn cartilage repair   • LEG EXCISION LESION/CYST Left 11/22/2016    Procedure: ANKLE LOOSE BODY EXCISION/REMOVAL, LEFT;  Surgeon: Ajay Comer DPM;  Location: [facility]  OR;  Service:      Family History   Problem Relation Age of Onset   • Cancer Mother    • Diabetes Mother    • Heart disease Father    • Cancer Father    • Hypertension Father    • Arthritis Father    • Stroke Father    • Hyperlipidemia Father      Social History     Socioeconomic History   • Marital status:      Spouse name: Not on file   • Number of children: Not on file   • Years of education: Not on file   • Highest education level: Not on file   Tobacco Use   • Smoking status: Never Smoker   • Smokeless tobacco: Current User     Types: Snuff   • Tobacco comment: ~40 YEARS   Substance and Sexual Activity   • Alcohol use: No   • Drug use: No   • Sexual activity: Defer     Allergies   Allergen Reactions   • Reglan [Metoclopramide] Anaphylaxis   • Allopurinol Rash   • Keflex [Cephalexin] Rash     Current Outpatient Medications   Medication Sig Dispense Refill   • diclofenac (VOLTAREN) 3 % gel gel Apply  topically to the appropriate area as directed 2 (Two) Times a Day. for 60 days. 100 g 5     No current facility-administered medications for this visit.      Review of Systems   Constitutional: Negative for chills and fever.   HENT: Negative for congestion.    Respiratory: Negative for shortness of breath.    Cardiovascular: Negative for chest pain and leg swelling.   Gastrointestinal: Negative for constipation, diarrhea, nausea and vomiting.   Musculoskeletal:        Ankle pain   Skin: Negative for wound.   Neurological: Negative for numbness.       OBJECTIVE     Vitals:    03/20/19 1350   BP: 124/80   Pulse: 80   SpO2: 96%       PHYSICAL EXAM  GEN:   A&Ox3, NAD. Pt presents to clinic ambulating without assistance and wearing Casual Shoes. Accompanied by wife.     Physical Exam    NEURO:   Protective sensation intact to 10/10 sites Right foot, 10/10 site Left foot using [place] monofilament  Light touch sensation present  No Tinel's or Villeux sign.    VASC:  Skin temperature Warm to Warm proximal  to distal odilon  DP pulses 2/4 Right, 2/4 Left  PT pulses 2/4 Right, 2/4 Left  CFT 3 sec odilon  Pedal hair growth present  no edema noted odilon  Varicosities absent odilon    MUSC/SKEL:  Pain noted to left anterior lateral gutter.   No pain to peroneals  Ankle appears stable.  Able to hien foot. Guarded against inversion.    DERM:  Pedal nails x10 are within normal limits of length and thickness  Webspaces are Clean, Dry, and Intact  Skin is normal in turgor and texture with no open wounds or sores appreciated.  Previous incision site at anterior medial aspect of left ankle is fully coapted with no signs of hypertrophied scar  Incisions noted to left lateral ankle. Fully healed No signs of dehiscence or infection.        RADIOLOGY/NUCLEAR:  No results found.   IMPRESSION:  1. Findings suspicious for a peroneus brevis tendon tear. Tendinopathy  of the peroneus longus tendon favored.   2. Questionable tenosynovitis involving the flexor hallux longus and  posterior tibialis tendons.  3. Osteochondral defect along the lateral talar dome.  4. Scattered edema involving the tarsal likely proximal metatarsal  bones. No discrete fracture.  5. Os trigonum with minimal edema seen adjacent to the accessory  ossicle.    LABORATORY/CULTURE RESULTS:      PATHOLOGY RESULTS:       ASSESSMENT/PLAN     Ismael was seen today for follow-up.    Diagnoses and all orders for this visit:    Chronic pain of left ankle  -     Inject Trigger Point, 1 or 2  -     dexamethasone sodium phosphate injection 10 mg  -     bupivacaine (MARCAINE) 0.5 % injection 1 mL  -     triamcinolone acetonide (KENALOG-40) injection 20 mg    Other orders  -     diclofenac (VOLTAREN) 3 % gel gel; Apply  topically to the appropriate area as directed 2 (Two) Times a Day. for 60 days.      Comprehensive lower extremity examination and evaluation was performed.  Discussed findings and treatment plan including risks, benefits, and treatment options with patient in detail.  Patient agreed with treatment plan.  After written consent obtained, therapeutic steroid injection performed at site of pain.    Icing, NSAIDs and ROM exercises.  May need further PT or updated MRI  An After Visit Summary was printed and given to the patient at discharge, including (if requested) any available informative/educational handouts regarding diagnosis, treatment, or medications. All questions were answered to patient/family satisfaction. Should symptoms fail to improve or worsen they agree to call or return to clinic or to go to the Emergency Department. Discussed the importance of following up with any needed screening tests/labs/specialist appointments and any requested follow-up recommended by me today. Importance of maintaining follow-up discussed and patient accepts that missed appointments can delay diagnosis and potentially lead to worsening of conditions.  Return in about 2 weeks (around 4/3/2019)., or sooner if acute issues arise.    Inject Trigger Point, 1 or 2  Date/Time: 3/20/2019 4:55 PM  Performed by: Ajay Comer DPM  Authorized by: Ajay Comer DPM   Preparation: Patient was prepped and draped in the usual sterile fashion.  Local anesthesia used: yes  Anesthesia: local infiltration    Anesthesia:  Local anesthesia used: yes  Local Anesthetic: bupivacaine 0.5% without epinephrine  Anesthetic total: 1 mL    Sedation:  Patient sedated: no    Patient tolerance: Patient tolerated the procedure well with no immediate complications  Comments: 1cc Dexamethasone, 0.5cc Kenalog 40          Lab Frequency Next Occurrence   Follow Anesthesia Guidelines / Standing Orders Once 11/09/2016   XR Ankle 3+ View Left Once 04/01/2018   XR Foot 3+ View Left Once 04/01/2018       This document has been electronically signed by Ajay Comer DPM on March 20, 2019 4:54 PM

## 2019-03-19 NOTE — TELEPHONE ENCOUNTER
Called and reminded patient of appointment with Dr. Davis Cmoer on March 20, 2019 at 1:45 pm. Patient gave verbal confirmation of appointment at this time.

## 2019-03-20 ENCOUNTER — OFFICE VISIT (OUTPATIENT)
Dept: PODIATRY | Facility: CLINIC | Age: 54
End: 2019-03-20

## 2019-03-20 VITALS
OXYGEN SATURATION: 96 % | SYSTOLIC BLOOD PRESSURE: 124 MMHG | HEART RATE: 80 BPM | HEIGHT: 70 IN | BODY MASS INDEX: 29.92 KG/M2 | DIASTOLIC BLOOD PRESSURE: 80 MMHG | WEIGHT: 209 LBS

## 2019-03-20 DIAGNOSIS — G89.29 CHRONIC PAIN OF LEFT ANKLE: Primary | ICD-10-CM

## 2019-03-20 DIAGNOSIS — M25.572 CHRONIC PAIN OF LEFT ANKLE: Primary | ICD-10-CM

## 2019-03-20 PROBLEM — S86.312D PERONEAL TENDON TEAR, LEFT, SUBSEQUENT ENCOUNTER: Status: RESOLVED | Noted: 2018-05-16 | Resolved: 2019-03-20

## 2019-03-20 PROCEDURE — 20552 NJX 1/MLT TRIGGER POINT 1/2: CPT | Performed by: PODIATRIST

## 2019-03-20 PROCEDURE — 99213 OFFICE O/P EST LOW 20 MIN: CPT | Performed by: PODIATRIST

## 2019-03-20 RX ORDER — TRIAMCINOLONE ACETONIDE 40 MG/ML
20 INJECTION, SUSPENSION INTRA-ARTICULAR; INTRAMUSCULAR ONCE
Status: COMPLETED | OUTPATIENT
Start: 2019-03-20 | End: 2019-03-20

## 2019-03-20 RX ORDER — DEXAMETHASONE SODIUM PHOSPHATE 10 MG/ML
10 INJECTION, SOLUTION INTRAMUSCULAR; INTRAVENOUS ONCE
Status: COMPLETED | OUTPATIENT
Start: 2019-03-20 | End: 2019-03-20

## 2019-03-20 RX ORDER — BUPIVACAINE HYDROCHLORIDE 5 MG/ML
1 INJECTION, SOLUTION PERINEURAL ONCE
Status: COMPLETED | OUTPATIENT
Start: 2019-03-20 | End: 2019-03-20

## 2019-03-20 RX ADMIN — BUPIVACAINE HYDROCHLORIDE 1 ML: 5 INJECTION, SOLUTION PERINEURAL at 14:34

## 2019-03-20 RX ADMIN — TRIAMCINOLONE ACETONIDE 20 MG: 40 INJECTION, SUSPENSION INTRA-ARTICULAR; INTRAMUSCULAR at 14:35

## 2019-03-20 RX ADMIN — DEXAMETHASONE SODIUM PHOSPHATE 10 MG: 10 INJECTION, SOLUTION INTRAMUSCULAR; INTRAVENOUS at 14:35

## 2019-03-20 NOTE — PATIENT INSTRUCTIONS
Ankle Pain  Many things can cause ankle pain, including an injury to the area and overuse of the ankle. The ankle joint holds your body weight and allows you to move around. Ankle pain can occur on either side or the back of one ankle or both ankles. Ankle pain may be sharp and burning or dull and aching. There may be tenderness, stiffness, redness, or warmth around the ankle.  Follow these instructions at home:  Activity  · Rest your ankle as told by your health care provider. Avoid any activities that cause ankle pain.  · Do exercises as told by your health care provider.  · Ask your health care provider if you can drive.  Using a brace, a bandage, or crutches  · If you were given a brace:  ? Wear it as told by your health care provider.  ? Remove it when you take a bath or a shower.  ? Try not to move your ankle very much, but wiggle your toes from time to time. This helps to prevent swelling.  · If you were given an elastic bandage:  ? Remove it when you take a bath or a shower.  ? Try not to move your ankle very much, but wiggle your toes from time to time. This helps to prevent swelling.  ? Adjust the bandage to make it more comfortable if it feels too tight.  ? Loosen the bandage if you have numbness or tingling in your foot or if your foot turns cold and blue.  · If you have crutches, use them as told by your health care provider. Continue to use them until you can walk without feeling pain in your ankle.  Managing pain, stiffness, and swelling  · Raise (elevate) your ankle above the level of your heart while you are sitting or lying down.  · If directed, apply ice to the area:  ? Put ice in a plastic bag.  ? Place a towel between your skin and the bag.  ? Leave the ice on for 20 minutes, 2-3 times per day.  General instructions  · Keep all follow-up visits as told by your health care provider. This is important.  · Record this information that may be helpful for you and your health care provider:  ? How  often you have ankle pain.  ? Where the pain is located.  ? What the pain feels like.  · Take over-the-counter and prescription medicines only as told by your health care provider.  Contact a health care provider if:  · Your pain gets worse.  · Your pain is not relieved with medicines.  · You have a fever or chills.  · You are having more trouble with walking.  · You have new symptoms.  Get help right away if:  · Your foot, leg, toes, or ankle tingles or becomes numb.  · Your foot, leg, toes, or ankle becomes swollen.  · Your foot, leg, toes, or ankle turns pale or blue.  This information is not intended to replace advice given to you by your health care provider. Make sure you discuss any questions you have with your health care provider.  Document Released: 06/07/2011 Document Revised: 08/18/2017 Document Reviewed: 07/19/2016  [employer] Patient Education © 2019 [employer]

## 2019-03-21 ENCOUNTER — TELEPHONE (OUTPATIENT)
Dept: PODIATRY | Facility: CLINIC | Age: 54
End: 2019-03-21

## 2019-03-21 NOTE — TELEPHONE ENCOUNTER
After speaking with Dr. Comer, I called the patient back. Informed patient that he can use OTC pain creams rather than diclofenac gel and if OTC does not work, we can try compound pain creams. Patient gave verbal understanding of what was discussed and did not have any further questions.

## 2019-03-21 NOTE — TELEPHONE ENCOUNTER
Patient called and stated that his insurance will not cover diclofenac gel and that it would cost ~$900. Informed patient that I would discuss this with Dr. Comer. Patient gave verbal understanding of what was discussed at this time.

## 2019-04-04 ENCOUNTER — TELEPHONE (OUTPATIENT)
Dept: PODIATRY | Facility: CLINIC | Age: 54
End: 2019-04-04

## 2019-04-04 NOTE — TELEPHONE ENCOUNTER
Called and reminded patient of appointment with Dr. Davis Comer on April 5, 2019 at 4:00 pm. Patient gave verbal confirmation of appointment at this time.

## 2019-04-05 ENCOUNTER — OFFICE VISIT (OUTPATIENT)
Dept: PODIATRY | Facility: CLINIC | Age: 54
End: 2019-04-05

## 2019-04-05 VITALS
DIASTOLIC BLOOD PRESSURE: 78 MMHG | HEIGHT: 70 IN | BODY MASS INDEX: 29.92 KG/M2 | SYSTOLIC BLOOD PRESSURE: 136 MMHG | HEART RATE: 81 BPM | OXYGEN SATURATION: 97 % | WEIGHT: 209 LBS

## 2019-04-05 DIAGNOSIS — M25.572 CHRONIC PAIN OF LEFT ANKLE: Primary | ICD-10-CM

## 2019-04-05 DIAGNOSIS — G89.29 CHRONIC PAIN OF LEFT ANKLE: Primary | ICD-10-CM

## 2019-04-05 PROCEDURE — 99213 OFFICE O/P EST LOW 20 MIN: CPT | Performed by: PODIATRIST

## 2019-05-17 ENCOUNTER — TELEPHONE (OUTPATIENT)
Dept: PODIATRY | Facility: CLINIC | Age: 54
End: 2019-05-17

## 2019-05-17 NOTE — TELEPHONE ENCOUNTER
Mr. Huff called and inquired about having another injection. I explained to Mr. Huff that Dr. Comer typically does injections around the three month codie. Patient stated that he knew Dr. Comer had told him that if the pain came back before the three month codie, that he might try a different approach. Let patient know that I would speak with Dr. Comer and call him back.     After speaking with Dr. Davis Comer, I returned the call to Mr. Huff and explained to him that Dr. Comer said that he will be able to have another injection. Patient is now scheduled for May 24, 2019 at 9:45 am. Mr. Huff gave verbal understanding of all that was discussed and did not have any further questions.

## 2019-05-21 NOTE — PROGRESS NOTES
[facility] - PODIATRY    Today's Date: 05/24/19    Patient Name: Ismael Huff  MRN: [id number]  CSN: [id number]  PCP: Edouard Madrigal DO  Referring Provider: No ref. provider found    SUBJECTIVE     Chief Complaint   Patient presents with   • Follow-up     Patient complains of ankle pain and swelling into lower extremity. Pain scale: 3/10, admits to flare up yesterday. PCP: Dr. Edouard Madrigal.     HPI: Ismael Huff, a 54 y.o.male, comes to clinic as a(n) established patient complaining of ankle pain. He is 7 months post op lateral ankle stabilization and peroneal repair. Patient has h/o arthritis, gout, kidney stones. States that 2 days ago he stepped into a hole while working in his yard and hurt is left ankle. Up until that point his ankle had been improving, especially after the previous injection. Says he would like another injection to help with his pain and inflammation. Continues use of ankle brace daily. Admits pain at 3/10 level and described as aching and throbbing. Notes incisions have remained healed. Denies any constitutional symptoms. No other pedal complaints at this time.    Past Medical History:   Diagnosis Date   • Ankle pain, left    • Arthritis    • Gout    • History of kidney stones    • History of pneumonia    • Tendonitis of ankle     left   • Wears dentures      Past Surgical History:   Procedure Laterality Date   • ANKLE LIGAMENT RECONSTRUCTION Left 9/19/2018    Procedure: Lateral Ankle Stabilization, Peroneal Tendon Repair - Left;  Surgeon: Ajay Comer DPM;  Location: [facility] OR;  Service: Podiatry   • ANKLE SURGERY Left    • COLECTOMY PARTIAL / TOTAL  2011    polyps ruptured   • COLONOSCOPY     • KNEE SURGERY Left 1990s    torn cartilage repair   • LEG EXCISION LESION/CYST Left 11/22/2016    Procedure: ANKLE LOOSE BODY EXCISION/REMOVAL, LEFT;  Surgeon: Ajay Comer DPM;  Location:  [facility] OR;  Service:      Family History   Problem Relation Age of Onset    • Cancer Mother    • Diabetes Mother    • Heart disease Father    • Cancer Father    • Hypertension Father    • Arthritis Father    • Stroke Father    • Hyperlipidemia Father      Social History     Socioeconomic History   • Marital status:      Spouse name: Not on file   • Number of children: Not on file   • Years of education: Not on file   • Highest education level: Not on file   Tobacco Use   • Smoking status: Never Smoker   • Smokeless tobacco: Current User     Types: Snuff   • Tobacco comment: ~40 YEARS   Substance and Sexual Activity   • Alcohol use: No   • Drug use: No   • Sexual activity: Defer     Allergies   Allergen Reactions   • Reglan [Metoclopramide] Anaphylaxis   • Allopurinol Rash   • Keflex [Cephalexin] Rash     No current outpatient medications on file.     Current Facility-Administered Medications   Medication Dose Route Frequency Provider Last Rate Last Dose   • bupivacaine (MARCAINE) 0.5 % injection 1.5 mL  1.5 mL Injection Once Ajay Comer DPM       • dexamethasone (DECADRON) injection 10 mg  10 mg Intramuscular Once Ajay Comer DPM       • triamcinolone acetonide (KENALOG-40) injection 10 mg  10 mg Intramuscular Once Ajay Comer DPM         Review of Systems   Constitutional: Negative for chills and fever.   HENT: Negative for congestion.    Respiratory: Negative for shortness of breath.    Cardiovascular: Negative for chest pain and leg swelling.   Gastrointestinal: Negative for constipation, diarrhea, nausea and vomiting.   Musculoskeletal:        Ankle pain   Skin: Negative for wound.   Neurological: Negative for numbness.       OBJECTIVE     Vitals:    05/24/19 0946   BP: 140/82   Pulse: 67   SpO2: 93%       PHYSICAL EXAM  GEN:   A&Ox3, NAD. Pt presents to clinic ambulating without assistance and wearing Casual Shoes. Accompanied by wife.     Physical Exam    NEURO:   Protective sensation intact to 10/10 sites Right foot, 10/10 site Left foot using  [place]ley monofilament  Light touch sensation present  No Tinel's or Villeux sign.    VASC:  Skin temperature Warm to Warm proximal to distal odilon  DP pulses 2/4 Right, 2/4 Left  PT pulses 2/4 Right, 2/4 Left  CFT 3 sec odilon  Pedal hair growth present  no edema noted odilon  Varicosities absent odilon    MUSC/SKEL:  Moderate pain noted to left anterior lateral gutter.   No pain to peroneals, Left  No pain to STJ, Left  Ankle appears stable.  Able to hien foot. Guarded against inversion.    DERM:  Pedal nails x10 are within normal limits of length and thickness  Webspaces are Clean, Dry, and Intact  Skin is normal in turgor and texture with no open wounds or sores appreciated.  Previous incision site at anterior medial aspect of left ankle is fully coapted with no signs of hypertrophied scar  Incisions noted to left lateral ankle. Fully healed No signs of dehiscence or infection.        RADIOLOGY/NUCLEAR:  No results found.   IMPRESSION:  1. Findings suspicious for a peroneus brevis tendon tear. Tendinopathy  of the peroneus longus tendon favored.   2. Questionable tenosynovitis involving the flexor hallux longus and  posterior tibialis tendons.  3. Osteochondral defect along the lateral talar dome.  4. Scattered edema involving the tarsal likely proximal metatarsal  bones. No discrete fracture.  5. Os trigonum with minimal edema seen adjacent to the accessory  ossicle.    LABORATORY/CULTURE RESULTS:      PATHOLOGY RESULTS:       ASSESSMENT/PLAN     Ismael was seen today for follow-up.    Diagnoses and all orders for this visit:    Chronic pain of left ankle  -     triamcinolone acetonide (KENALOG-40) injection 10 mg  -     bupivacaine (MARCAINE) 0.5 % injection 1.5 mL  -     dexamethasone (DECADRON) injection 10 mg  -     Inject Trigger Point, 1 or 2      Comprehensive lower extremity examination and evaluation was performed.  Discussed findings and treatment plan including risks, benefits, and treatment options  with patient in detail. Patient agreed with treatment plan.  After verbal consent obtained, therapeutic injection performed to left anterior lateral ankle. Tolerated well.   Icing, NSAIDs PRN.  Use ankle brace.  An After Visit Summary was printed and given to the patient at discharge, including (if requested) any available informative/educational handouts regarding diagnosis, treatment, or medications. All questions were answered to patient/family satisfaction. Should symptoms fail to improve or worsen they agree to call or return to clinic or to go to the Emergency Department. Discussed the importance of following up with any needed screening tests/labs/specialist appointments and any requested follow-up recommended by me today. Importance of maintaining follow-up discussed and patient accepts that missed appointments can delay diagnosis and potentially lead to worsening of conditions.  Return if symptoms worsen or fail to improve., or sooner if acute issues arise.    Inject Trigger Point, 1 or 2  Date/Time: 5/24/2019 10:24 AM  Performed by: Ajay Comer DPM  Authorized by: Ajay Comer DPM   Preparation: Patient was prepped and draped in the usual sterile fashion.  Local anesthesia used: yes  Anesthesia: local infiltration    Anesthesia:  Local anesthesia used: yes  Local Anesthetic: bupivacaine 0.5% without epinephrine  Anesthetic total: 1.5 mL    Sedation:  Patient sedated: no    Patient tolerance: Patient tolerated the procedure well with no immediate complications  Comments: 1cc Dexamethasone, 0.5cc Kenalog 40.          Lab Frequency Next Occurrence   Follow Anesthesia Guidelines / Standing Orders Once 11/09/2016   XR Ankle 3+ View Left Once 04/01/2018   XR Foot 3+ View Left Once 04/01/2018       This document has been electronically signed by Ajay Comer DPM on May 24, 2019 10:23 AM

## 2019-05-23 ENCOUNTER — TELEPHONE (OUTPATIENT)
Dept: PODIATRY | Facility: CLINIC | Age: 54
End: 2019-05-23

## 2019-05-23 NOTE — TELEPHONE ENCOUNTER
Unable to leave voicemail to remind patient of appointment tomorrow with Dr. Davis Comer. Patient does not have voicemail at this time.

## 2019-05-24 ENCOUNTER — OFFICE VISIT (OUTPATIENT)
Dept: PODIATRY | Facility: CLINIC | Age: 54
End: 2019-05-24

## 2019-05-24 VITALS
HEIGHT: 70 IN | BODY MASS INDEX: 29.92 KG/M2 | OXYGEN SATURATION: 93 % | DIASTOLIC BLOOD PRESSURE: 82 MMHG | HEART RATE: 67 BPM | WEIGHT: 209 LBS | SYSTOLIC BLOOD PRESSURE: 140 MMHG

## 2019-05-24 DIAGNOSIS — G89.29 CHRONIC PAIN OF LEFT ANKLE: Primary | ICD-10-CM

## 2019-05-24 DIAGNOSIS — M25.572 CHRONIC PAIN OF LEFT ANKLE: Primary | ICD-10-CM

## 2019-05-24 PROCEDURE — 99213 OFFICE O/P EST LOW 20 MIN: CPT | Performed by: PODIATRIST

## 2019-05-24 PROCEDURE — 20552 NJX 1/MLT TRIGGER POINT 1/2: CPT | Performed by: PODIATRIST

## 2019-05-24 RX ORDER — BUPIVACAINE HYDROCHLORIDE 5 MG/ML
1.5 INJECTION, SOLUTION PERINEURAL ONCE
Status: COMPLETED | OUTPATIENT
Start: 2019-05-24 | End: 2019-05-24

## 2019-05-24 RX ORDER — DEXAMETHASONE SODIUM PHOSPHATE 10 MG/ML
10 INJECTION INTRAMUSCULAR; INTRAVENOUS ONCE
Status: COMPLETED | OUTPATIENT
Start: 2019-05-24 | End: 2019-05-24

## 2019-05-24 RX ORDER — TRIAMCINOLONE ACETONIDE 40 MG/ML
10 INJECTION, SUSPENSION INTRA-ARTICULAR; INTRAMUSCULAR ONCE
Status: COMPLETED | OUTPATIENT
Start: 2019-05-24 | End: 2019-05-24

## 2019-05-24 RX ADMIN — DEXAMETHASONE SODIUM PHOSPHATE 10 MG: 10 INJECTION INTRAMUSCULAR; INTRAVENOUS at 11:46

## 2019-05-24 RX ADMIN — BUPIVACAINE HYDROCHLORIDE 1.5 ML: 5 INJECTION, SOLUTION PERINEURAL at 11:45

## 2019-05-24 RX ADMIN — TRIAMCINOLONE ACETONIDE 10 MG: 40 INJECTION, SUSPENSION INTRA-ARTICULAR; INTRAMUSCULAR at 11:47

## 2019-10-15 ENCOUNTER — TELEPHONE (OUTPATIENT)
Dept: PODIATRY | Facility: CLINIC | Age: 54
End: 2019-10-15

## 2019-10-15 NOTE — TELEPHONE ENCOUNTER
Attempted to call patient for appointment reminder to see Dr. Comer - no answer, or voicemail setup. DN

## 2019-10-15 NOTE — PROGRESS NOTES
[facility] - PODIATRY    Today's Date: 10/16/19    Patient Name: Ismael Huff  MRN: [id number]  CSN: [id number]  PCP: Edouard Madrigal DO  Referring Provider: No ref. provider found    SUBJECTIVE     Chief Complaint   Patient presents with   • Follow-up     pt c/o left foot/ankle pain- pt stated at work he is on his feet 12-14 hours a day and at the end of day pt stated pain, swelling, discoloration - pain scale 8/10- pt stated the pain started getting worse around 3 months ago  PCP Dr. Madrigal      HPI: Ismael Huff, a 54 y.o.male, comes to clinic as a(n) established patient complaining of ankle pain. He is ~12 months post op lateral ankle stabilization and peroneal repair. Patient has h/o arthritis, gout, kidney stones. States that he continues to have some pain to the outside of his left ankle near the tip of his fibula. Denies recent injury but does admits minimal motion to his ankle and that he is on his feet 12-14 hours per day. Continues use of ankle brace daily. Admits pain at 8/10 level and described as aching and throbbing. Notes incisions have remained healed. Denies any constitutional symptoms. No other pedal complaints at this time.    Past Medical History:   Diagnosis Date   • Ankle pain, left    • Arthritis    • Gout    • History of kidney stones    • History of pneumonia    • Tendonitis of ankle     left   • Wears dentures      Past Surgical History:   Procedure Laterality Date   • ANKLE LIGAMENT RECONSTRUCTION Left 9/19/2018    Procedure: Lateral Ankle Stabilization, Peroneal Tendon Repair - Left;  Surgeon: Ajay Comer DPM;  Location:  [facility] OR;  Service: Podiatry   • ANKLE SURGERY Left    • COLECTOMY PARTIAL / TOTAL  2011    polyps ruptured   • COLONOSCOPY     • KNEE SURGERY Left 1990s    torn cartilage repair   • LEG EXCISION LESION/CYST Left 11/22/2016    Procedure: ANKLE LOOSE BODY EXCISION/REMOVAL, LEFT;  Surgeon: Ajay Comer DPM;  Location:  [facility] OR;   Service:      Family History   Problem Relation Age of Onset   • Cancer Mother    • Diabetes Mother    • Heart disease Father    • Cancer Father    • Hypertension Father    • Arthritis Father    • Stroke Father    • Hyperlipidemia Father      Social History     Socioeconomic History   • Marital status:      Spouse name: Not on file   • Number of children: Not on file   • Years of education: Not on file   • Highest education level: Not on file   Tobacco Use   • Smoking status: Never Smoker   • Smokeless tobacco: Current User     Types: Snuff   • Tobacco comment: ~40 YEARS   Substance and Sexual Activity   • Alcohol use: No   • Drug use: No   • Sexual activity: Defer     Allergies   Allergen Reactions   • Reglan [Metoclopramide] Anaphylaxis   • Allopurinol Rash   • Keflex [Cephalexin] Rash     No current outpatient medications on file.     No current facility-administered medications for this visit.      Review of Systems   Constitutional: Negative for chills and fever.   HENT: Negative for congestion.    Respiratory: Negative for shortness of breath.    Cardiovascular: Negative for chest pain and leg swelling.   Gastrointestinal: Negative for constipation, diarrhea, nausea and vomiting.   Musculoskeletal:        Ankle pain   Skin: Negative for wound.   Neurological: Negative for numbness.       OBJECTIVE     Vitals:    10/16/19 1417   BP: 122/80   Pulse: 86   SpO2: 97%       PHYSICAL EXAM  GEN:   A&Ox3, NAD. Pt presents to clinic ambulating without assistance and wearing Casual Shoes. Accompanied by wife.     Physical Exam    NEURO:   Protective sensation intact to 10/10 sites Right foot, 10/10 site Left foot using Garfield-Ashley monofilament  Light touch sensation present  No Tinel's or Villeux sign.    VASC:  Skin temperature Warm to Warm proximal to distal odilon  DP pulses 2/4 Right, 2/4 Left  PT pulses 2/4 Right, 2/4 Left  CFT 3 sec odilon  Pedal hair growth present  no edema noted odilon  Varicosities absent  odilon    MUSC/SKEL:  Moderate pain noted to left anterior lateral gutter and distal tip of fibula.   No pain to peroneals, Left  No pain to STJ, Left  Ankle appears stable.  Able to hien foot. Guarded against inversion.    DERM:  Pedal nails x10 are within normal limits of length and thickness  Webspaces are Clean, Dry, and Intact  Skin is normal in turgor and texture with no open wounds or sores appreciated.  Previous incision site at anterior medial aspect of left ankle is fully coapted with no signs of hypertrophied scar  Incisions noted to left lateral ankle. Fully healed No signs of dehiscence or infection.        RADIOLOGY/NUCLEAR:  No results found.   IMPRESSION:  1. Findings suspicious for a peroneus brevis tendon tear. Tendinopathy  of the peroneus longus tendon favored.   2. Questionable tenosynovitis involving the flexor hallux longus and  posterior tibialis tendons.  3. Osteochondral defect along the lateral talar dome.  4. Scattered edema involving the tarsal likely proximal metatarsal  bones. No discrete fracture.  5. Os trigonum with minimal edema seen adjacent to the accessory  ossicle.    LABORATORY/CULTURE RESULTS:      PATHOLOGY RESULTS:       ASSESSMENT/PLAN     Ismael was seen today for follow-up.    Diagnoses and all orders for this visit:    Chronic pain of left ankle  -     Inject Trigger Point, 1 or 2  -     Ambulatory Referral to Physical Therapy Evaluate and treat; Soft Tissue Mobilizaton; Stretching (left lateral ankle)  -     bupivacaine (MARCAINE) 0.5 % injection 1 mL  -     dexamethasone (DECADRON) injection 10 mg    Foot pain, left  -     bupivacaine (MARCAINE) 0.5 % injection 1 mL  -     dexamethasone (DECADRON) injection 10 mg      Comprehensive lower extremity examination and evaluation was performed.  Discussed findings and treatment plan including risks, benefits, and treatment options with patient in detail. Patient agreed with treatment plan.  After written consent obtained,  therapeutic injection performed to left anterior lateral ankle. Tolerated well.   Icing, NSAIDs PRN.  Use ankle brace.  An After Visit Summary was printed and given to the patient at discharge, including (if requested) any available informative/educational handouts regarding diagnosis, treatment, or medications. All questions were answered to patient/family satisfaction. Should symptoms fail to improve or worsen they agree to call or return to clinic or to go to the Emergency Department. Discussed the importance of following up with any needed screening tests/labs/specialist appointments and any requested follow-up recommended by me today. Importance of maintaining follow-up discussed and patient accepts that missed appointments can delay diagnosis and potentially lead to worsening of conditions.  Return if symptoms worsen or fail to improve., or sooner if acute issues arise.    Inject Trigger Point, 1 or 2  Date/Time: 10/16/2019 3:01 PM  Performed by: Ajay oCmer DPM  Authorized by: Ajay Comer DPM   Preparation: Patient was prepped and draped in the usual sterile fashion.  Local anesthesia used: yes  Anesthesia: local infiltration    Anesthesia:  Local anesthesia used: yes  Local Anesthetic: bupivacaine 0.5% without epinephrine  Anesthetic total: 1 mL    Sedation:  Patient sedated: no    Patient tolerance: Patient tolerated the procedure well with no immediate complications  Comments: 1cc Dexamethasone          Lab Frequency Next Occurrence   Follow Anesthesia Guidelines / Standing Orders Once 11/09/2016   XR Ankle 3+ View Left Once 04/01/2018   XR Foot 3+ View Left Once 04/01/2018       This document has been electronically signed by Ajay Comer DPM on October 16, 2019 3:03 PM

## 2019-10-15 NOTE — TELEPHONE ENCOUNTER
Patient called and stated that he is having the same pain that he was experiencing before his second surgical procedure by Dr. Comer. I have scheduled Mr. Huff for tomorrow afternoon at 2:00 pm.

## 2019-10-16 ENCOUNTER — OFFICE VISIT (OUTPATIENT)
Dept: PODIATRY | Facility: CLINIC | Age: 54
End: 2019-10-16

## 2019-10-16 VITALS
SYSTOLIC BLOOD PRESSURE: 122 MMHG | BODY MASS INDEX: 28.31 KG/M2 | HEIGHT: 72 IN | WEIGHT: 209 LBS | DIASTOLIC BLOOD PRESSURE: 80 MMHG | OXYGEN SATURATION: 97 % | HEART RATE: 86 BPM

## 2019-10-16 DIAGNOSIS — M25.572 CHRONIC PAIN OF LEFT ANKLE: Primary | ICD-10-CM

## 2019-10-16 DIAGNOSIS — G89.29 CHRONIC PAIN OF LEFT ANKLE: Primary | ICD-10-CM

## 2019-10-16 DIAGNOSIS — M79.672 FOOT PAIN, LEFT: ICD-10-CM

## 2019-10-16 PROCEDURE — 20552 NJX 1/MLT TRIGGER POINT 1/2: CPT | Performed by: PODIATRIST

## 2019-10-16 PROCEDURE — 99213 OFFICE O/P EST LOW 20 MIN: CPT | Performed by: PODIATRIST

## 2019-10-16 RX ORDER — BUPIVACAINE HYDROCHLORIDE 5 MG/ML
1 INJECTION, SOLUTION PERINEURAL ONCE
Status: COMPLETED | OUTPATIENT
Start: 2019-10-16 | End: 2019-10-16

## 2019-10-16 RX ORDER — DEXAMETHASONE SODIUM PHOSPHATE 10 MG/ML
10 INJECTION INTRAMUSCULAR; INTRAVENOUS ONCE
Status: COMPLETED | OUTPATIENT
Start: 2019-10-16 | End: 2019-10-16

## 2019-10-16 RX ADMIN — DEXAMETHASONE SODIUM PHOSPHATE 10 MG: 10 INJECTION INTRAMUSCULAR; INTRAVENOUS at 14:55

## 2019-10-16 RX ADMIN — BUPIVACAINE HYDROCHLORIDE 1 ML: 5 INJECTION, SOLUTION PERINEURAL at 14:54

## 2020-02-13 ENCOUNTER — OFFICE VISIT (OUTPATIENT)
Dept: SURGERY | Age: 55
End: 2020-02-13
Payer: COMMERCIAL

## 2020-02-13 VITALS
HEIGHT: 72 IN | DIASTOLIC BLOOD PRESSURE: 60 MMHG | BODY MASS INDEX: 31.15 KG/M2 | WEIGHT: 230 LBS | SYSTOLIC BLOOD PRESSURE: 110 MMHG

## 2020-02-13 PROBLEM — K43.9 VENTRAL HERNIA WITHOUT OBSTRUCTION OR GANGRENE: Status: ACTIVE | Noted: 2020-02-13

## 2020-02-13 PROCEDURE — 99204 OFFICE O/P NEW MOD 45 MIN: CPT | Performed by: SURGERY

## 2020-02-13 RX ORDER — HEPARIN SODIUM 5000 [USP'U]/ML
5000 INJECTION, SOLUTION INTRAVENOUS; SUBCUTANEOUS ONCE
Status: CANCELLED | OUTPATIENT
Start: 2020-02-13 | End: 2020-02-13

## 2020-02-13 RX ORDER — SODIUM CHLORIDE, SODIUM LACTATE, POTASSIUM CHLORIDE, CALCIUM CHLORIDE 600; 310; 30; 20 MG/100ML; MG/100ML; MG/100ML; MG/100ML
INJECTION, SOLUTION INTRAVENOUS CONTINUOUS
Status: CANCELLED | OUTPATIENT
Start: 2020-02-13

## 2020-02-13 RX ORDER — SODIUM CHLORIDE 0.9 % (FLUSH) 0.9 %
10 SYRINGE (ML) INJECTION EVERY 12 HOURS SCHEDULED
Status: CANCELLED | OUTPATIENT
Start: 2020-02-13

## 2020-02-13 RX ORDER — SODIUM CHLORIDE 0.9 % (FLUSH) 0.9 %
10 SYRINGE (ML) INJECTION PRN
Status: CANCELLED | OUTPATIENT
Start: 2020-02-13

## 2020-02-13 ASSESSMENT — ENCOUNTER SYMPTOMS
CONSTIPATION: 0
EYE PAIN: 0
EYE REDNESS: 0
CHEST TIGHTNESS: 0
CHOKING: 1
ABDOMINAL DISTENTION: 0
COUGH: 0
SHORTNESS OF BREATH: 0
COLOR CHANGE: 0
SORE THROAT: 0
DIARRHEA: 0
VOMITING: 0
BACK PAIN: 0
ABDOMINAL PAIN: 1
NAUSEA: 0

## 2020-02-13 NOTE — PROGRESS NOTES
Mental Status: He is alert and oriented to person, place, and time. Psychiatric:         Behavior: Behavior normal.         Thought Content: Thought content normal.         Judgment: Judgment normal.       ASSESSMENT:   Diagnosis Orders   1. Ventral hernia without obstruction or gangrene         PLAN:    The risks, benefits, and alternatives were discussed with the pt. He is willing to accept the risks and proceed with a robotic assisted ventral hernia repair with mesh. Discussed that given his previous surgeries, he may require an open hernia repair and he notes understanding. The surgical risks included but not limited to bleeding, infection, perforation, recurrence, risk of needing further surgery, etc. The anesthetic risks included heart attacks, strokes, pneumonia, phlebitis, etc.  He is willing to accept all risks and proceed with surgery. No guarantees have been given. Return for Post-operative Visit.     DO Yady 5/52/9013 2:23 PM

## 2020-02-13 NOTE — H&P
Gastrointestinal: Positive for abdominal pain. Negative for abdominal distention, constipation, diarrhea, nausea and vomiting. Endocrine: Negative for cold intolerance, heat intolerance and polydipsia. Genitourinary: Positive for urgency. Negative for difficulty urinating and frequency. Musculoskeletal: Positive for gait problem and joint swelling. Negative for back pain and neck pain. Skin: Negative for color change, rash and wound. Allergic/Immunologic: Negative for environmental allergies and food allergies. Neurological: Negative for seizures, light-headedness and headaches. Hematological: Negative for adenopathy. Bruises/bleeds easily. Psychiatric/Behavioral: Negative for confusion, sleep disturbance and suicidal ideas. OBJECTIVE:  /60 (Site: Right Upper Arm, Position: Sitting, Cuff Size: Medium Adult)   Ht 6' (1.829 m)   Wt 230 lb (104.3 kg)   BMI 31.19 kg/m²   Physical Exam  Vitals signs reviewed. Constitutional:       Appearance: He is well-developed. HENT:      Head: Normocephalic and atraumatic. Eyes:      Pupils: Pupils are equal, round, and reactive to light. Neck:      Musculoskeletal: Normal range of motion and neck supple. Cardiovascular:      Rate and Rhythm: Normal rate and regular rhythm. Heart sounds: Normal heart sounds. Pulmonary:      Effort: Pulmonary effort is normal.      Breath sounds: Normal breath sounds. No wheezing or rales. Abdominal:      General: A surgical scar is present. There is no distension. Palpations: Abdomen is soft. There is no mass. Tenderness: There is abdominal tenderness in the periumbilical area. There is no guarding or rebound. Hernia: A hernia is present. Hernia is present in the ventral area (right lateral site of scar, reducible, tender, two defects are present, each ~2 x 2 cm). Musculoskeletal: Normal range of motion. Lymphadenopathy:      Cervical: No cervical adenopathy.    Skin:     General: Skin is warm and dry. Neurological:      Mental Status: He is alert and oriented to person, place, and time. Psychiatric:         Behavior: Behavior normal.         Thought Content: Thought content normal.         Judgment: Judgment normal.         ASSESSMENT:   Diagnosis Orders   1. Ventral hernia without obstruction or gangrene         PLAN:    The risks, benefits, and alternatives were discussed with the pt. He is willing to accept the risks and proceed with a robotic assisted ventral hernia repair with mesh. Discussed that given his previous surgeries, he may require an open hernia repair and he notes understanding. The surgical risks included but not limited to bleeding, infection, perforation, recurrence, risk of needing further surgery, etc. The anesthetic risks included heart attacks, strokes, pneumonia, phlebitis, etc.  He is willing to accept all risks and proceed with surgery. No guarantees have been given. Return for Post-operative Visit.     DO Yady 2/07/5872 2:23 PM

## 2020-02-13 NOTE — LETTER
SCHEDULING INSTRUCTIONS:     Patient: Nicky Pina  : 1965    [facility]    Admitting Physician:  Dr. Roberta Preston    Diagnosis: Ventral Hernia, Reducible x 2    Procedure: Robotic Assisted Laparoscopic Ventral hernia Repair x 2    ALLOW ADDITIONAL 30 MINS FOR TURNOVER EXCEPT FOR PHYSICIAN'S BOUNCE DAY    Anesthesia: General    Admission:Outpatient     Date: 2020               NOT TO BE USED OUTSIDE OF THE OFFICE

## 2020-02-13 NOTE — H&P (VIEW-ONLY)
SUBJECTIVE:  Mr. Sarai Hernández is a 48 y.o. male who presents today with pain at his belly-button for about the last two months. He has a history of colon surgery in 2011 for perforated diverticulitis and he has two bulging areas at his scar on his abdominal wall. He notes he is able to push the areas back in but they get bigger and hurt more with coughing and heavy lifting. Past Medical History:   Diagnosis Date    Bleeds easily ([address]     Headache(784.0)     Hyperlipidemia     S/P cardiac cath 7/14/11     Past Surgical History:   Procedure Laterality Date    ANKLE SURGERY      chip, fracture, and then tendon repair    COLON SURGERY      polyps that ruptured, colostomy takedown    GALLBLADDER SURGERY      KIDNEY STONE SURGERY  96    KNEE ARTHROSCOPY  07     Current Outpatient Medications   Medication Sig Dispense Refill    atorvastatin (LIPITOR) 40 MG tablet Take 40 mg by mouth daily.  levothyroxine (LEVOTHROID) 50 MCG tablet Take 50 mcg by mouth daily. No current facility-administered medications for this visit. Allergies: Cephalexin; Reglan [metoclopramide hcl]; and Allopurinol    Family History   Problem Relation Age of Onset    Cancer Mother         lung     Diabetes Mother     Coronary Art Dis Father     Cancer Father         lung    Hypertension Father     Heart Attack Father 43        with CHF       Social History     Tobacco Use    Smoking status: Never Smoker    Smokeless tobacco: Current User     Types: Snuff   Substance Use Topics    Alcohol use: No       Review of Systems   Constitutional: Positive for unexpected weight change. Negative for fatigue and fever. HENT: Negative for hearing loss, nosebleeds and sore throat. Eyes: Negative for pain, redness and visual disturbance. Respiratory: Positive for choking. Negative for cough, chest tightness and shortness of breath. Cardiovascular: Positive for palpitations. Negative for chest pain and leg swelling. Gastrointestinal: Positive for abdominal pain. Negative for abdominal distention, constipation, diarrhea, nausea and vomiting. Endocrine: Negative for cold intolerance, heat intolerance and polydipsia. Genitourinary: Positive for urgency. Negative for difficulty urinating and frequency. Musculoskeletal: Positive for gait problem and joint swelling. Negative for back pain and neck pain. Skin: Negative for color change, rash and wound. Allergic/Immunologic: Negative for environmental allergies and food allergies. Neurological: Negative for seizures, light-headedness and headaches. Hematological: Negative for adenopathy. Bruises/bleeds easily. Psychiatric/Behavioral: Negative for confusion, sleep disturbance and suicidal ideas. OBJECTIVE:  /60 (Site: Right Upper Arm, Position: Sitting, Cuff Size: Medium Adult)   Ht 6' (1.829 m)   Wt 230 lb (104.3 kg)   BMI 31.19 kg/m²   Physical Exam  Vitals signs reviewed. Constitutional:       Appearance: He is well-developed. HENT:      Head: Normocephalic and atraumatic. Eyes:      Pupils: Pupils are equal, round, and reactive to light. Neck:      Musculoskeletal: Normal range of motion and neck supple. Cardiovascular:      Rate and Rhythm: Normal rate and regular rhythm. Heart sounds: Normal heart sounds. Pulmonary:      Effort: Pulmonary effort is normal.      Breath sounds: Normal breath sounds. No wheezing or rales. Abdominal:      General: A surgical scar is present. There is no distension. Palpations: Abdomen is soft. There is no mass. Tenderness: There is abdominal tenderness in the periumbilical area. There is no guarding or rebound. Hernia: A hernia is present. Hernia is present in the ventral area (right lateral site of scar, reducible, tender, two defects are present, each ~2 x 2 cm). Musculoskeletal: Normal range of motion. Lymphadenopathy:      Cervical: No cervical adenopathy.    Skin:     General:

## 2020-02-19 ENCOUNTER — HOSPITAL ENCOUNTER (OUTPATIENT)
Dept: PREADMISSION TESTING | Age: 55
Discharge: HOME | End: 2020-02-23
Payer: COMMERCIAL

## 2020-02-19 VITALS — HEIGHT: 72 IN | WEIGHT: 230 LBS | BODY MASS INDEX: 31.15 KG/M2

## 2020-02-19 LAB
APTT: 28.7 SEC (ref 26–36.2)
INR BLD: 0.98 (ref 0.88–1.18)
PROTHROMBIN TIME: 12.4 SEC (ref 12–14.6)

## 2020-02-19 PROCEDURE — 93005 ELECTROCARDIOGRAM TRACING: CPT | Performed by: ANESTHESIOLOGY

## 2020-02-19 PROCEDURE — 85610 PROTHROMBIN TIME: CPT

## 2020-02-19 PROCEDURE — 85730 THROMBOPLASTIN TIME PARTIAL: CPT

## 2020-02-19 PROCEDURE — 87081 CULTURE SCREEN ONLY: CPT

## 2020-02-19 RX ORDER — LEVOTHYROXINE SODIUM 0.03 MG/1
25 TABLET ORAL DAILY
COMMUNITY
End: 2021-02-23

## 2020-02-20 LAB
EKG P AXIS: 30 DEGREES
EKG P-R INTERVAL: 162 MS
EKG Q-T INTERVAL: 408 MS
EKG QRS DURATION: 102 MS
EKG QTC CALCULATION (BAZETT): 408 MS
EKG T AXIS: 30 DEGREES
MRSA CULTURE ONLY: NORMAL

## 2020-02-21 ENCOUNTER — ANESTHESIA (OUTPATIENT)
Dept: OPERATING ROOM | Age: 55
End: 2020-02-21
Payer: COMMERCIAL

## 2020-02-21 ENCOUNTER — HOSPITAL ENCOUNTER (OUTPATIENT)
Age: 55
Setting detail: OUTPATIENT SURGERY
Discharge: HOME | End: 2020-02-21
Attending: SURGERY | Admitting: SURGERY
Payer: COMMERCIAL

## 2020-02-21 ENCOUNTER — ANESTHESIA EVENT (OUTPATIENT)
Dept: OPERATING ROOM | Age: 55
End: 2020-02-21
Payer: COMMERCIAL

## 2020-02-21 VITALS
RESPIRATION RATE: 18 BRPM | DIASTOLIC BLOOD PRESSURE: 78 MMHG | HEIGHT: 72 IN | SYSTOLIC BLOOD PRESSURE: 113 MMHG | OXYGEN SATURATION: 95 % | BODY MASS INDEX: 31.15 KG/M2 | HEART RATE: 64 BPM | TEMPERATURE: 97.1 F | WEIGHT: 230 LBS

## 2020-02-21 VITALS
OXYGEN SATURATION: 89 % | DIASTOLIC BLOOD PRESSURE: 92 MMHG | SYSTOLIC BLOOD PRESSURE: 141 MMHG | RESPIRATION RATE: 6 BRPM | TEMPERATURE: 95.5 F

## 2020-02-21 PROBLEM — K43.2 INCISIONAL HERNIA, WITHOUT OBSTRUCTION OR GANGRENE: Status: ACTIVE | Noted: 2020-02-13

## 2020-02-21 PROCEDURE — 6360000002 HC RX W HCPCS: Performed by: SURGERY

## 2020-02-21 PROCEDURE — 2580000003 HC RX 258: Performed by: SURGERY

## 2020-02-21 PROCEDURE — 7100000010 HC PHASE II RECOVERY - FIRST 15 MIN: Performed by: SURGERY

## 2020-02-21 PROCEDURE — 3600000019 HC SURGERY ROBOT ADDTL 15MIN: Performed by: SURGERY

## 2020-02-21 PROCEDURE — 3700000000 HC ANESTHESIA ATTENDED CARE: Performed by: SURGERY

## 2020-02-21 PROCEDURE — C1781 MESH (IMPLANTABLE): HCPCS | Performed by: SURGERY

## 2020-02-21 PROCEDURE — 2709999900 HC NON-CHARGEABLE SUPPLY: Performed by: SURGERY

## 2020-02-21 PROCEDURE — 49654 PR LAP, INCISIONAL HERNIA REPAIR,REDUCIBLE: CPT | Performed by: SURGERY

## 2020-02-21 PROCEDURE — 2500000003 HC RX 250 WO HCPCS: Performed by: SURGERY

## 2020-02-21 PROCEDURE — 7100000000 HC PACU RECOVERY - FIRST 15 MIN: Performed by: SURGERY

## 2020-02-21 PROCEDURE — 2500000003 HC RX 250 WO HCPCS: Performed by: NURSE ANESTHETIST, CERTIFIED REGISTERED

## 2020-02-21 PROCEDURE — S2900 ROBOTIC SURGICAL SYSTEM: HCPCS | Performed by: SURGERY

## 2020-02-21 PROCEDURE — 6360000002 HC RX W HCPCS: Performed by: NURSE ANESTHETIST, CERTIFIED REGISTERED

## 2020-02-21 PROCEDURE — 3600000009 HC SURGERY ROBOT BASE: Performed by: SURGERY

## 2020-02-21 PROCEDURE — 6370000000 HC RX 637 (ALT 250 FOR IP): Performed by: SURGERY

## 2020-02-21 PROCEDURE — C9290 INJ, BUPIVACAINE LIPOSOME: HCPCS | Performed by: SURGERY

## 2020-02-21 PROCEDURE — 7100000001 HC PACU RECOVERY - ADDTL 15 MIN: Performed by: SURGERY

## 2020-02-21 PROCEDURE — 3700000001 HC ADD 15 MINUTES (ANESTHESIA): Performed by: SURGERY

## 2020-02-21 DEVICE — MESH SURG W4XL6IN ELLIPSE W/ ECHO 2 POS SYS VENTRALIGHT: Type: IMPLANTABLE DEVICE | Site: ABDOMEN | Status: FUNCTIONAL

## 2020-02-21 RX ORDER — MORPHINE SULFATE 4 MG/ML
4 INJECTION, SOLUTION INTRAMUSCULAR; INTRAVENOUS EVERY 5 MIN PRN
Status: DISCONTINUED | OUTPATIENT
Start: 2020-02-21 | End: 2020-02-21 | Stop reason: HOSPADM

## 2020-02-21 RX ORDER — DIPHENHYDRAMINE HYDROCHLORIDE 50 MG/ML
12.5 INJECTION INTRAMUSCULAR; INTRAVENOUS
Status: DISCONTINUED | OUTPATIENT
Start: 2020-02-21 | End: 2020-02-21 | Stop reason: HOSPADM

## 2020-02-21 RX ORDER — DEXAMETHASONE SODIUM PHOSPHATE 4 MG/ML
INJECTION, SOLUTION INTRA-ARTICULAR; INTRALESIONAL; INTRAMUSCULAR; INTRAVENOUS; SOFT TISSUE PRN
Status: DISCONTINUED | OUTPATIENT
Start: 2020-02-21 | End: 2020-02-21 | Stop reason: SDUPTHER

## 2020-02-21 RX ORDER — GLYCOPYRROLATE 0.2 MG/ML
INJECTION INTRAMUSCULAR; INTRAVENOUS PRN
Status: DISCONTINUED | OUTPATIENT
Start: 2020-02-21 | End: 2020-02-21 | Stop reason: SDUPTHER

## 2020-02-21 RX ORDER — BUPIVACAINE HYDROCHLORIDE 2.5 MG/ML
INJECTION, SOLUTION INFILTRATION; PERINEURAL PRN
Status: DISCONTINUED | OUTPATIENT
Start: 2020-02-21 | End: 2020-02-21 | Stop reason: ALTCHOICE

## 2020-02-21 RX ORDER — ONDANSETRON 2 MG/ML
INJECTION INTRAMUSCULAR; INTRAVENOUS PRN
Status: DISCONTINUED | OUTPATIENT
Start: 2020-02-21 | End: 2020-02-21 | Stop reason: SDUPTHER

## 2020-02-21 RX ORDER — ROCURONIUM BROMIDE 10 MG/ML
INJECTION, SOLUTION INTRAVENOUS PRN
Status: DISCONTINUED | OUTPATIENT
Start: 2020-02-21 | End: 2020-02-21 | Stop reason: SDUPTHER

## 2020-02-21 RX ORDER — CLINDAMYCIN PHOSPHATE 150 MG/ML
INJECTION, SOLUTION INTRAVENOUS PRN
Status: DISCONTINUED | OUTPATIENT
Start: 2020-02-21 | End: 2020-02-21 | Stop reason: SDUPTHER

## 2020-02-21 RX ORDER — SODIUM CHLORIDE, SODIUM LACTATE, POTASSIUM CHLORIDE, CALCIUM CHLORIDE 600; 310; 30; 20 MG/100ML; MG/100ML; MG/100ML; MG/100ML
INJECTION, SOLUTION INTRAVENOUS CONTINUOUS
Status: DISCONTINUED | OUTPATIENT
Start: 2020-02-21 | End: 2020-02-21 | Stop reason: HOSPADM

## 2020-02-21 RX ORDER — OXYCODONE HYDROCHLORIDE AND ACETAMINOPHEN 5; 325 MG/1; MG/1
1 TABLET ORAL ONCE
Status: COMPLETED | OUTPATIENT
Start: 2020-02-21 | End: 2020-02-21

## 2020-02-21 RX ORDER — HYDRALAZINE HYDROCHLORIDE 20 MG/ML
5 INJECTION INTRAMUSCULAR; INTRAVENOUS EVERY 10 MIN PRN
Status: DISCONTINUED | OUTPATIENT
Start: 2020-02-21 | End: 2020-02-21 | Stop reason: HOSPADM

## 2020-02-21 RX ORDER — LIDOCAINE HYDROCHLORIDE 10 MG/ML
1 INJECTION, SOLUTION EPIDURAL; INFILTRATION; INTRACAUDAL; PERINEURAL
Status: DISCONTINUED | OUTPATIENT
Start: 2020-02-21 | End: 2020-02-21 | Stop reason: HOSPADM

## 2020-02-21 RX ORDER — FENTANYL CITRATE 50 UG/ML
INJECTION, SOLUTION INTRAMUSCULAR; INTRAVENOUS PRN
Status: DISCONTINUED | OUTPATIENT
Start: 2020-02-21 | End: 2020-02-21 | Stop reason: SDUPTHER

## 2020-02-21 RX ORDER — SCOLOPAMINE TRANSDERMAL SYSTEM 1 MG/1
1 PATCH, EXTENDED RELEASE TRANSDERMAL ONCE
Status: DISCONTINUED | OUTPATIENT
Start: 2020-02-21 | End: 2020-02-21 | Stop reason: HOSPADM

## 2020-02-21 RX ORDER — OXYCODONE HYDROCHLORIDE AND ACETAMINOPHEN 5; 325 MG/1; MG/1
1 TABLET ORAL EVERY 6 HOURS PRN
Qty: 20 TABLET | Refills: 0 | Status: SHIPPED | OUTPATIENT
Start: 2020-02-21 | End: 2020-02-26

## 2020-02-21 RX ORDER — MEPERIDINE HYDROCHLORIDE 50 MG/ML
12.5 INJECTION INTRAMUSCULAR; INTRAVENOUS; SUBCUTANEOUS EVERY 5 MIN PRN
Status: DISCONTINUED | OUTPATIENT
Start: 2020-02-21 | End: 2020-02-21 | Stop reason: HOSPADM

## 2020-02-21 RX ORDER — PROPOFOL 10 MG/ML
INJECTION, EMULSION INTRAVENOUS PRN
Status: DISCONTINUED | OUTPATIENT
Start: 2020-02-21 | End: 2020-02-21 | Stop reason: SDUPTHER

## 2020-02-21 RX ORDER — PROMETHAZINE HYDROCHLORIDE 25 MG/ML
6.25 INJECTION, SOLUTION INTRAMUSCULAR; INTRAVENOUS
Status: DISCONTINUED | OUTPATIENT
Start: 2020-02-21 | End: 2020-02-21 | Stop reason: HOSPADM

## 2020-02-21 RX ORDER — FENTANYL CITRATE 50 UG/ML
50 INJECTION, SOLUTION INTRAMUSCULAR; INTRAVENOUS
Status: DISCONTINUED | OUTPATIENT
Start: 2020-02-21 | End: 2020-02-21 | Stop reason: HOSPADM

## 2020-02-21 RX ORDER — MORPHINE SULFATE 4 MG/ML
2 INJECTION, SOLUTION INTRAMUSCULAR; INTRAVENOUS EVERY 5 MIN PRN
Status: DISCONTINUED | OUTPATIENT
Start: 2020-02-21 | End: 2020-02-21 | Stop reason: HOSPADM

## 2020-02-21 RX ORDER — HEPARIN SODIUM 5000 [USP'U]/ML
5000 INJECTION, SOLUTION INTRAVENOUS; SUBCUTANEOUS ONCE
Status: COMPLETED | OUTPATIENT
Start: 2020-02-21 | End: 2020-02-21

## 2020-02-21 RX ORDER — SODIUM CHLORIDE 0.9 % (FLUSH) 0.9 %
10 SYRINGE (ML) INJECTION PRN
Status: DISCONTINUED | OUTPATIENT
Start: 2020-02-21 | End: 2020-02-21 | Stop reason: HOSPADM

## 2020-02-21 RX ORDER — LIDOCAINE HYDROCHLORIDE 10 MG/ML
INJECTION, SOLUTION EPIDURAL; INFILTRATION; INTRACAUDAL; PERINEURAL PRN
Status: DISCONTINUED | OUTPATIENT
Start: 2020-02-21 | End: 2020-02-21 | Stop reason: SDUPTHER

## 2020-02-21 RX ORDER — MIDAZOLAM HYDROCHLORIDE 1 MG/ML
2 INJECTION INTRAMUSCULAR; INTRAVENOUS
Status: DISCONTINUED | OUTPATIENT
Start: 2020-02-21 | End: 2020-02-21 | Stop reason: HOSPADM

## 2020-02-21 RX ORDER — MORPHINE SULFATE 4 MG/ML
4 INJECTION, SOLUTION INTRAMUSCULAR; INTRAVENOUS
Status: DISCONTINUED | OUTPATIENT
Start: 2020-02-21 | End: 2020-02-21 | Stop reason: HOSPADM

## 2020-02-21 RX ORDER — LABETALOL 20 MG/4 ML (5 MG/ML) INTRAVENOUS SYRINGE
5 EVERY 10 MIN PRN
Status: DISCONTINUED | OUTPATIENT
Start: 2020-02-21 | End: 2020-02-21 | Stop reason: HOSPADM

## 2020-02-21 RX ORDER — SODIUM CHLORIDE 0.9 % (FLUSH) 0.9 %
10 SYRINGE (ML) INJECTION EVERY 12 HOURS SCHEDULED
Status: DISCONTINUED | OUTPATIENT
Start: 2020-02-21 | End: 2020-02-21 | Stop reason: HOSPADM

## 2020-02-21 RX ORDER — KETOROLAC TROMETHAMINE 30 MG/ML
INJECTION, SOLUTION INTRAMUSCULAR; INTRAVENOUS PRN
Status: DISCONTINUED | OUTPATIENT
Start: 2020-02-21 | End: 2020-02-21 | Stop reason: SDUPTHER

## 2020-02-21 RX ADMIN — FENTANYL CITRATE 50 MCG: 50 INJECTION INTRAMUSCULAR; INTRAVENOUS at 08:38

## 2020-02-21 RX ADMIN — PHENYLEPHRINE HYDROCHLORIDE 100 MCG: 10 INJECTION INTRAVENOUS at 08:50

## 2020-02-21 RX ADMIN — ONDANSETRON HYDROCHLORIDE 4 MG: 2 INJECTION, SOLUTION INTRAMUSCULAR; INTRAVENOUS at 10:42

## 2020-02-21 RX ADMIN — FENTANYL CITRATE 100 MCG: 50 INJECTION INTRAMUSCULAR; INTRAVENOUS at 08:33

## 2020-02-21 RX ADMIN — LIDOCAINE HYDROCHLORIDE 50 MG: 10 INJECTION, SOLUTION EPIDURAL; INFILTRATION; INTRACAUDAL; PERINEURAL at 08:33

## 2020-02-21 RX ADMIN — PROPOFOL 200 MG: 10 INJECTION, EMULSION INTRAVENOUS at 08:33

## 2020-02-21 RX ADMIN — HEPARIN SODIUM 5000 UNITS: 5000 INJECTION INTRAVENOUS; SUBCUTANEOUS at 07:02

## 2020-02-21 RX ADMIN — OXYCODONE HYDROCHLORIDE AND ACETAMINOPHEN 1 TABLET: 5; 325 TABLET ORAL at 12:06

## 2020-02-21 RX ADMIN — PHENYLEPHRINE HYDROCHLORIDE 100 MCG: 10 INJECTION INTRAVENOUS at 08:44

## 2020-02-21 RX ADMIN — ROCURONIUM BROMIDE 100 MG: 10 SOLUTION INTRAVENOUS at 08:33

## 2020-02-21 RX ADMIN — SODIUM CHLORIDE, SODIUM LACTATE, POTASSIUM CHLORIDE, AND CALCIUM CHLORIDE: 600; 310; 30; 20 INJECTION, SOLUTION INTRAVENOUS at 07:02

## 2020-02-21 RX ADMIN — PHENYLEPHRINE HYDROCHLORIDE 100 MCG: 10 INJECTION INTRAVENOUS at 08:47

## 2020-02-21 RX ADMIN — CLINDAMYCIN PHOSPHATE 900 MG: 150 INJECTION, SOLUTION INTRAMUSCULAR; INTRAVENOUS at 08:36

## 2020-02-21 RX ADMIN — PHENYLEPHRINE HYDROCHLORIDE 100 MCG: 10 INJECTION INTRAVENOUS at 09:17

## 2020-02-21 RX ADMIN — SODIUM CHLORIDE, SODIUM LACTATE, POTASSIUM CHLORIDE, AND CALCIUM CHLORIDE: 600; 310; 30; 20 INJECTION, SOLUTION INTRAVENOUS at 09:17

## 2020-02-21 RX ADMIN — DEXAMETHASONE SODIUM PHOSPHATE 10 MG: 4 INJECTION, SOLUTION INTRAMUSCULAR; INTRAVENOUS at 08:46

## 2020-02-21 RX ADMIN — KETOROLAC TROMETHAMINE 30 MG: 30 INJECTION, SOLUTION INTRAMUSCULAR; INTRAVENOUS at 10:43

## 2020-02-21 RX ADMIN — NEOSTIGMINE METHYLSULFATE 2 MG: 1 INJECTION, SOLUTION INTRAMUSCULAR; INTRAVENOUS; SUBCUTANEOUS at 10:42

## 2020-02-21 RX ADMIN — GLYCOPYRROLATE 0.4 MG: 1 INJECTION INTRAMUSCULAR; INTRAVENOUS at 10:42

## 2020-02-21 ASSESSMENT — PAIN SCALES - GENERAL
PAINLEVEL_OUTOF10: 6
PAINLEVEL_OUTOF10: 2
PAINLEVEL_OUTOF10: 6

## 2020-02-21 ASSESSMENT — LIFESTYLE VARIABLES: SMOKING_STATUS: 0

## 2020-02-21 ASSESSMENT — ENCOUNTER SYMPTOMS: SHORTNESS OF BREATH: 0

## 2020-02-21 ASSESSMENT — PAIN DESCRIPTION - PAIN TYPE: TYPE: SURGICAL PAIN

## 2020-02-21 ASSESSMENT — PAIN SCALES - WONG BAKER: WONGBAKER_NUMERICALRESPONSE: 0

## 2020-02-21 NOTE — OP NOTE
SURGICAL DEPARTMENT REPORT    SURGEON:   DO Yady    DATE OF SERVICE: 2/21/2020    PREOPERATIVE DIAGNOSIS  Multiple incisional hernias    POSTOPERATIVE DIAGNOSIS  Incisional hernia x 4    PROCEDURE  Robotic Laparoscopic Assisted incisional Hernia repair with mesh    ASSISTANT  CORNELIO Roth    INDICATION  Mr. Matthew Brarett is a 48 y.o. male who has developed a symptomatic   Incisional hernia. He had previously had a severe case of diverticulitis which resulted in a Reddy's, and later a colostomy reversal.  Following review of options for his care, he presents for robotic, laparoscopic assisted incisional hernia repair. DESCRIPTION OF PROCEDURE  Mr. Matthew Barrett was taken to the main operating room and placed on the operating  table supine with the left side raised slightly on a bump and slightly jackknifed. After the induction of adequate general endotracheal anesthesia, the abdomen was prepped and draped to a sterile field with I-O band. A time-out was undertaken. A small incision was made two fingerbreadths below the costal margin on the left side after instilling local anesthesic, and through this an optiview trocar was inserted, the abdomen was entered, and a pneumoperitoneum created. A laparoscope was advanced and inspection undertaken. No evidence of trauma from the trocar site insertion was appreciated. The hernia was seen along the umbilicus with overlying fat pads adherent in the area. Two additional 8 mm ports were placed along the left anterior axillary line and just superior to the ASIS. The camera was moved to the middle port, and the subcostal incision was up sized to a 12mm airseal port. The NanoH2O Xi system was brought into the field and trocars were secured. The camera was inserted and working   instruments were advanced. Traction was placed on the fat pads of the abdominal wall and a plane was created to place the mesh just beneath the peritoneum using  cautery elda.  Once the abdominal fat pad was completely down, the hernia defects were apparent along his previous incision. The PDA suture from his previous incisionw as not intact and the majority of it were removed. The defects that were most apparent were measured as 3 x 2 cm, 2 x 1.5cm, 0.5 x 0.5 cm and 0.25 x 0.25cm, all along the previous midline incision. His ostomy incision was without hernia. Thus, the selected mesh was Bard ECHO max, 10 x 15 cm oval.    The midline abdominal defects were approximated with 0-Stratifix suture, while incorporating the weakened abdominal wall the entire length of his previous incision. The mesh was then inserted and pulled up through the center of the suture line. The mesh was circumferentially secured with Stratifix 00 six inch suture circumferentially. The retaining device was removed from the mesh and it laid smoothly against the abdominal wall. A FRANCOIS block was then performed via direct laparoscopic visualization bilaterally with Exparel. The operative site was then checked and hemostasis assured. The 12mm port site fascial defect was closed with an 0-Vicryl suture. The remaining working  ports were removed with no bleeding noted. The pneumoperitoneum was released and the camera port removed. The port locations were closed with interrupted 4-0 monocryl subcuticular  suture followed by Dermabond. Sponge, needle, instrument counts correct on 2 occasions. Estimated intraoperative blood loss minimal.     Mr. Radha Mancilla tolerated his surgery well, and he was taken to PACU   in satisfactory condition.         ________________________________  Sana Glez DO

## 2020-02-21 NOTE — ANESTHESIA PRE PROCEDURE
Department of Anesthesiology  Preprocedure Note       Name:  Nabor Lei   Age:  48 y.o.  :  1965                                          MRN:  [id number]         Date:  2020      Surgeon: Stacie Jefferson):  Juanita Cunningham DO    Procedure: ROBOTIC ASSISTED LAPAROSCOPIC VENTRAL HERNIA REPAIR TIMES TWO (N/A )    Medications prior to admission:   Prior to Admission medications    Medication Sig Start Date End Date Taking? Authorizing Provider   levothyroxine (SYNTHROID) 25 MCG tablet Take 25 mcg by mouth Daily Indications: Underactive Thyroid   Yes Historical Provider, MD       Current medications:    Current Facility-Administered Medications   Medication Dose Route Frequency Provider Last Rate Last Dose    lactated ringers infusion   Intravenous Continuous Petty Blackwell,         sodium chloride flush 0.9 % injection 10 mL  10 mL Intravenous 2 times per day Petty Blackwell,         sodium chloride flush 0.9 % injection 10 mL  10 mL Intravenous PRN Petty Blackwell DO        heparin (porcine) injection 5,000 Units  5,000 Units Subcutaneous Once Petty Blackwell DO           Allergies: Allergies   Allergen Reactions    Reglan [Metoclopramide Hcl] Shortness Of Breath     \"cuts my breathing off\"    Allopurinol Rash    Cephalexin Rash     Keflex causes a rash       Problem List:    Patient Active Problem List   Diagnosis Code    Headache R51    Hyperlipemia E78.5    Ventral hernia without obstruction or gangrene K43.9       Past Medical History:        Diagnosis Date    Bleeds easily ([address]     ever since colon surgery    Headache(784.0)     Hyperlipidemia     mild; pcp never called in meds/no meds.      Kidney stone     hx of    S/P cardiac cath 2011    Thyroid disease     Ventral hernia        Past Surgical History:        Procedure Laterality Date    ANKLE SURGERY      chip, fracture, and then tendon repair; surgery x 2    CARDIAC CATHETERIZATION       2/21/2020

## 2020-02-21 NOTE — ANESTHESIA POSTPROCEDURE EVALUATION
Department of Anesthesiology  Postprocedure Note    Patient: Kota Brown  MRN: [id number]  YOB: 1965  Date of evaluation: 2/21/2020  Time:  10:59 AM     Procedure Summary     Date:  02/21/20 Room / Location:  [address]    Anesthesia Start:  8273 Anesthesia Stop:  1059    Procedure:  ROBOTIC ASSISTED LAPAROSCOPIC VENTRAL HERNIA REPAIR TIMES TWO (N/A ) Diagnosis:  (VENTRAL HERNIA , REDUCIBLE X2)    Surgeon:  Alexandra Marino DO Responsible Provider:  JON Starks CRNA    Anesthesia Type:  general ASA Status:  2          Anesthesia Type: general    Bruno Phase I:      Bruno Phase II:      Last vitals: Reviewed and per EMR flowsheets.        Anesthesia Post Evaluation    Patient location during evaluation: PACU  Patient participation: waiting for patient participation  Level of consciousness: responsive to verbal stimuli  Pain score: 0  Airway patency: patent  Nausea & Vomiting: no vomiting and no nausea  Complications: no  Cardiovascular status: hemodynamically stable  Respiratory status: acceptable and room air  Hydration status: stable  Comments: T 97.2

## 2020-02-24 ENCOUNTER — OFFICE VISIT (OUTPATIENT)
Dept: SURGERY | Age: 55
End: 2020-02-24

## 2020-02-24 VITALS
HEIGHT: 72 IN | TEMPERATURE: 97.8 F | DIASTOLIC BLOOD PRESSURE: 70 MMHG | SYSTOLIC BLOOD PRESSURE: 124 MMHG | BODY MASS INDEX: 30.88 KG/M2 | WEIGHT: 228 LBS

## 2020-02-24 PROCEDURE — 99024 POSTOP FOLLOW-UP VISIT: CPT | Performed by: SURGERY

## 2020-02-27 ENCOUNTER — TELEPHONE (OUTPATIENT)
Dept: SURGERY | Age: 55
End: 2020-02-27

## 2020-03-05 ENCOUNTER — OFFICE VISIT (OUTPATIENT)
Dept: SURGERY | Age: 55
End: 2020-03-05

## 2020-03-05 VITALS
HEIGHT: 72 IN | SYSTOLIC BLOOD PRESSURE: 122 MMHG | HEART RATE: 81 BPM | BODY MASS INDEX: 30.88 KG/M2 | OXYGEN SATURATION: 98 % | TEMPERATURE: 97.7 F | WEIGHT: 228 LBS | DIASTOLIC BLOOD PRESSURE: 84 MMHG

## 2020-03-05 PROBLEM — K43.2 INCISIONAL HERNIA, WITHOUT OBSTRUCTION OR GANGRENE: Status: RESOLVED | Noted: 2020-02-13 | Resolved: 2020-03-05

## 2020-03-05 PROCEDURE — 99024 POSTOP FOLLOW-UP VISIT: CPT | Performed by: SURGERY

## 2020-03-27 ENCOUNTER — TELEPHONE (OUTPATIENT)
Dept: SURGERY | Age: 55
End: 2020-03-27

## 2020-03-27 NOTE — TELEPHONE ENCOUNTER
Called patient to let him know we were rescheduling appointments into June at this time. He states he is doing fine. Appointment made for June.

## 2020-05-26 ENCOUNTER — TELEPHONE (OUTPATIENT)
Dept: SURGERY | Age: 55
End: 2020-05-26

## 2020-05-26 NOTE — TELEPHONE ENCOUNTER
Spoke with  Kiko Heller on the phone. He underwent robotic hernia repair 2/21/2020, and his 8 week f/u appointment was cancelled due to the [place] Pandemic. He states today that he is feeling well and back to his usual activities. He offers no complaints and is satisfied with his hernia repair. I instructed him that if he was having no issues he did not need to f/u 6/2/2020 and he notes he understands and would like to cancel the visit. F/U JEANN.      DO Yady  26/30/68  2:16 PM

## 2021-02-08 ENCOUNTER — OFFICE VISIT (OUTPATIENT)
Dept: URGENT CARE | Age: 56
End: 2021-02-08

## 2021-02-08 ENCOUNTER — HOSPITAL ENCOUNTER (OUTPATIENT)
Dept: NON INVASIVE DIAGNOSTICS | Age: 56
Discharge: HOME | End: 2021-02-08
Payer: MEDICAID

## 2021-02-08 ENCOUNTER — HOSPITAL ENCOUNTER (EMERGENCY)
Age: 56
Discharge: HOME | End: 2021-02-08
Attending: EMERGENCY MEDICINE
Payer: MEDICAID

## 2021-02-08 VITALS
OXYGEN SATURATION: 97 % | RESPIRATION RATE: 17 BRPM | DIASTOLIC BLOOD PRESSURE: 87 MMHG | TEMPERATURE: 98.4 F | SYSTOLIC BLOOD PRESSURE: 125 MMHG | BODY MASS INDEX: 32.51 KG/M2 | WEIGHT: 240 LBS | HEIGHT: 72 IN | HEART RATE: 75 BPM

## 2021-02-08 VITALS
HEART RATE: 87 BPM | DIASTOLIC BLOOD PRESSURE: 92 MMHG | RESPIRATION RATE: 18 BRPM | BODY MASS INDEX: 32.55 KG/M2 | OXYGEN SATURATION: 100 % | TEMPERATURE: 97.1 F | SYSTOLIC BLOOD PRESSURE: 132 MMHG | WEIGHT: 240 LBS

## 2021-02-08 DIAGNOSIS — M79.661 RIGHT CALF PAIN: ICD-10-CM

## 2021-02-08 DIAGNOSIS — I82.431 ACUTE DEEP VEIN THROMBOSIS (DVT) OF POPLITEAL VEIN OF RIGHT LOWER EXTREMITY (HCC): Primary | ICD-10-CM

## 2021-02-08 DIAGNOSIS — M79.89 CALF SWELLING: ICD-10-CM

## 2021-02-08 DIAGNOSIS — N28.9 RENAL INSUFFICIENCY: ICD-10-CM

## 2021-02-08 LAB
ALBUMIN SERPL-MCNC: 4.2 G/DL (ref 3.5–5.2)
ALP BLD-CCNC: 84 U/L (ref 40–130)
ALT SERPL-CCNC: 28 U/L (ref 5–41)
ANION GAP SERPL CALCULATED.3IONS-SCNC: 9 MMOL/L (ref 7–19)
APTT: 31.3 SEC (ref 26–36.2)
AST SERPL-CCNC: 27 U/L (ref 5–40)
BASOPHILS ABSOLUTE: 0.1 K/UL (ref 0–0.2)
BASOPHILS RELATIVE PERCENT: 0.8 % (ref 0–1)
BILIRUB SERPL-MCNC: 0.6 MG/DL (ref 0.2–1.2)
BUN BLDV-MCNC: 20 MG/DL (ref 6–20)
CALCIUM SERPL-MCNC: 9.2 MG/DL (ref 8.6–10)
CHLORIDE BLD-SCNC: 104 MMOL/L (ref 98–111)
CO2: 27 MMOL/L (ref 22–29)
CREAT SERPL-MCNC: 1.6 MG/DL (ref 0.5–1.2)
EOSINOPHILS ABSOLUTE: 0.2 K/UL (ref 0–0.6)
EOSINOPHILS RELATIVE PERCENT: 2.1 % (ref 0–5)
GFR AFRICAN AMERICAN: 55
GFR NON-AFRICAN AMERICAN: 45
GLUCOSE BLD-MCNC: 107 MG/DL (ref 74–109)
HCT VFR BLD CALC: 42.7 % (ref 42–52)
HEMOGLOBIN: 14.7 G/DL (ref 14–18)
IMMATURE GRANULOCYTES #: 0 K/UL
INR BLD: 1.08 (ref 0.88–1.18)
LYMPHOCYTES ABSOLUTE: 2.4 K/UL (ref 1.1–4.5)
LYMPHOCYTES RELATIVE PERCENT: 27 % (ref 20–40)
MCH RBC QN AUTO: 34.3 PG (ref 27–31)
MCHC RBC AUTO-ENTMCNC: 34.4 G/DL (ref 33–37)
MCV RBC AUTO: 99.5 FL (ref 80–94)
MONOCYTES ABSOLUTE: 0.7 K/UL (ref 0–0.9)
MONOCYTES RELATIVE PERCENT: 7.5 % (ref 0–10)
NEUTROPHILS ABSOLUTE: 5.5 K/UL (ref 1.5–7.5)
NEUTROPHILS RELATIVE PERCENT: 62.4 % (ref 50–65)
PDW BLD-RTO: 11.8 % (ref 11.5–14.5)
PLATELET # BLD: 315 K/UL (ref 130–400)
PMV BLD AUTO: 10 FL (ref 9.4–12.4)
POTASSIUM REFLEX MAGNESIUM: 4.3 MMOL/L (ref 3.5–5)
PROTHROMBIN TIME: 13.9 SEC (ref 12–14.6)
RBC # BLD: 4.29 M/UL (ref 4.7–6.1)
SODIUM BLD-SCNC: 140 MMOL/L (ref 136–145)
TOTAL PROTEIN: 8.3 G/DL (ref 6.6–8.7)
WBC # BLD: 8.9 K/UL (ref 4.8–10.8)

## 2021-02-08 PROCEDURE — 80053 COMPREHEN METABOLIC PANEL: CPT

## 2021-02-08 PROCEDURE — 99285 EMERGENCY DEPT VISIT HI MDM: CPT

## 2021-02-08 PROCEDURE — 85610 PROTHROMBIN TIME: CPT

## 2021-02-08 PROCEDURE — 36415 COLL VENOUS BLD VENIPUNCTURE: CPT

## 2021-02-08 PROCEDURE — 93971 EXTREMITY STUDY: CPT

## 2021-02-08 PROCEDURE — 85730 THROMBOPLASTIN TIME PARTIAL: CPT

## 2021-02-08 PROCEDURE — 6370000000 HC RX 637 (ALT 250 FOR IP): Performed by: EMERGENCY MEDICINE

## 2021-02-08 PROCEDURE — 99999 PR OFFICE/OUTPT VISIT,PROCEDURE ONLY: CPT | Performed by: NURSE PRACTITIONER

## 2021-02-08 PROCEDURE — 85025 COMPLETE CBC W/AUTO DIFF WBC: CPT

## 2021-02-08 RX ADMIN — RIVAROXABAN 15 MG: 15 TABLET, FILM COATED ORAL at 15:54

## 2021-02-08 ASSESSMENT — ENCOUNTER SYMPTOMS
CHEST TIGHTNESS: 0
SORE THROAT: 0
SHORTNESS OF BREATH: 0
WHEEZING: 0
DIARRHEA: 0
VOMITING: 0
NAUSEA: 0
APNEA: 0
CONSTIPATION: 0
DIARRHEA: 0
COLOR CHANGE: 1
SINUS PRESSURE: 0
CHOKING: 0
BLOOD IN STOOL: 0
NAUSEA: 0
EYES NEGATIVE: 1
VOICE CHANGE: 0
CONSTIPATION: 0
EYE DISCHARGE: 0
FACIAL SWELLING: 0
SORE THROAT: 0
SHORTNESS OF BREATH: 0

## 2021-02-08 NOTE — ED PROVIDER NOTES
[facility] EMERGENCY DEPT  eMERGENCY dEPARTMENT eNCOUnter      Pt Name: Merlinda Prows  MRN: [id number]  [place] 1965  Date of evaluation: 2/8/2021  Provider: Liliane Camacho MD    [address] [place] [address]       Chief Complaint   Patient presents with    Leg Pain     Seen at urgent care this morning, has had vascular study done today revealing bood clots          HISTORY OF PRESENT ILLNESS   (Location/Symptom, Timing/Onset,Context/Setting, Quality, Duration, Modifying Factors, Severity)  Note limiting factors. Merlinda Prows is a 50 y.o. male who presents to the emergency department evaluation of blood clot in right leg.    61-year-old male referred from urgent care with DVT the right lower extremity. Patient's had several days of right lower leg pain calf pain and swelling. He denies any known injury. 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 He has been on no long trips or no periods of sedentary behavior. No history of blood clot. No history of cancer. He was referred for higher level of care. He denies any bleeding issues currently. He also denies any chest symptoms no shortness of breath or chest discomfort or chest pain. The history is provided by the patient and medical records. NursingNotes were reviewed. REVIEW OF SYSTEMS    (2-9 systems for level 4, 10 or more for level 5)     Review of Systems   Constitutional: Negative for chills and fever. HENT: Negative for congestion, drooling, facial swelling, nosebleeds, sinus pressure, sore throat and voice change. Eyes: Negative for discharge. Respiratory: Negative for apnea, choking and shortness of breath. Cardiovascular: Negative for chest pain and leg swelling. Gastrointestinal: Negative for blood in stool, constipation, diarrhea and nausea. Genitourinary: Negative for dysuria and enuresis. Musculoskeletal: Negative for joint swelling.         Right leg pain and swelling   Skin: Negative for rash and wound. Neurological: Negative for seizures and syncope. Psychiatric/Behavioral: Negative for behavioral problems, hallucinations and suicidal ideas. All other systems reviewed and are negative. A complete review of systems was performed and is negative except as noted above in the HPI. PAST MEDICAL HISTORY     Past Medical History:   Diagnosis Date    Bleeds easily [facility])     ever since colon surgery    Headache(784.0)     Hyperlipidemia     mild; pcp never called in meds/no meds.      Kidney stone     hx of    S/P cardiac cath 07/14/2011    Thyroid disease     Ventral hernia          SURGICAL HISTORY       Past Surgical History:   Procedure Laterality Date    ANKLE SURGERY      chip, fracture, and then tendon repair; surgery x 2    CARDIAC CATHETERIZATION  2011    dr. yarbrough/normal/throught the wrist    CHOLECYSTECTOMY      COLON SURGERY      polyps that ruptured, colostomy takedown    GALLBLADDER SURGERY      HERNIA REPAIR N/A 2/21/2020    ROBOTIC ASSISTED LAPAROSCOPIC VENTRAL HERNIA REPAIR TIMES TWO performed by Lexus Joel DO at [address] [place] [address] ARTHROSCOPY  07         CURRENT MEDICATIONS       Discharge Medication List as of 2/8/2021  3:26 PM      CONTINUE these medications which have NOT CHANGED    Details   levothyroxine (SYNTHROID) 25 MCG tablet Take 25 mcg by mouth Daily Indications: Underactive ThyroidHistorical Med             ALLERGIES     Reglan [metoclopramide hcl], Allopurinol, and Cephalexin    FAMILY HISTORY       Family History   Problem Relation Age of Onset    Cancer Mother         lung     Diabetes Mother     Coronary Art Dis Father     Cancer Father         lung    Hypertension Father     Heart Attack Father 43        with CHF          SOCIAL HISTORY       Social History     Socioeconomic History    Marital status:      Spouse name: None    Number of children: None    Years of education: None    Highest education level: None   Occupational History    None   Social Needs    Financial resource strain: None    Food insecurity     Worry: None     Inability: None    Transportation needs     Medical: None     Non-medical: None   Tobacco Use    Smoking status: Never Smoker    Smokeless tobacco: Current User     Types: Snuff    Tobacco comment: dips one can every 3 days. Substance and Sexual Activity    Alcohol use: No    Drug use: No    Sexual activity: None   Lifestyle    Physical activity     Days per week: None     Minutes per session: None    Stress: None   Relationships    Social connections     Talks on phone: None     Gets together: None     Attends Druze service: None     Active member of club or organization: None     Attends meetings of clubs or organizations: None     Relationship status: None    Intimate partner violence     Fear of current or ex partner: None     Emotionally abused: None     Physically abused: None     Forced sexual activity: None   Other Topics Concern    None   Social History Narrative    None       SCREENINGS    [place] Coma Scale  Eye Opening: Spontaneous  Best Verbal Response: Oriented  Best Motor Response: Obeys commands  [place] Coma Scale Score: 15        PHYSICAL EXAM    (up to 7 for level 4, 8 or more for level 5)     ED Triage Vitals [02/08/21 1216]   BP Temp Temp Source Pulse Resp SpO2 Height Weight   (!) 132/92 97.9 °F (36.6 °C) Oral 81 18 97 % 6' (1.829 m) 240 lb (108.9 kg)       Physical Exam  Vitals signs and nursing note reviewed. Constitutional:       General: He is not in acute distress. Appearance: He is well-developed. HENT:      Head: Normocephalic and atraumatic. Right Ear: External ear normal.      Left Ear: External ear normal.   Eyes:      General: No scleral icterus. Conjunctiva/sclera: Conjunctivae normal.      Pupils: Pupils are equal, round, and reactive to light.    Neck:      Musculoskeletal: Normal range of motion and neck supple. Cardiovascular:      Rate and Rhythm: Normal rate and regular rhythm. Pulses: Normal pulses. Heart sounds: Normal heart sounds. Pulmonary:      Effort: Pulmonary effort is normal. No respiratory distress. Breath sounds: Normal breath sounds. Abdominal:      General: Bowel sounds are normal.      Palpations: Abdomen is soft. Musculoskeletal: Normal range of motion. General: Swelling and tenderness present. Comments: Swelling and tenderness positive Homans' sign on the right calf. Skin:     General: Skin is warm and dry. Coloration: Skin is not jaundiced or pale. Neurological:      General: No focal deficit present. Mental Status: He is alert and oriented to person, place, and time.    Psychiatric:         Mood and Affect: Mood normal.         Behavior: Behavior normal.         DIAGNOSTIC RESULTS     EKG: All EKG's are interpreted by the Emergency Department Physician who either signs or Co-signs this chart in the absence of a cardiologist.        RADIOLOGY:   Non-plain film images such as CT, Ultrasound and MRI are read by the radiologist. Coleen Walters images are visualized and preliminarily interpreted by the emergency physician with the below findings:        Interpretation per the Radiologist below, if available at the time of this note:    No orders to display         ED BEDSIDE ULTRASOUND:   Performed by ED Physician - none    LABS:  Labs Reviewed   CBC WITH AUTO DIFFERENTIAL - Abnormal; Notable for the following components:       Result Value    RBC 4.29 (*)     MCV 99.5 (*)     MCH 34.3 (*)     All other components within normal limits   COMPREHENSIVE METABOLIC PANEL W/ REFLEX TO MG FOR LOW K - Abnormal; Notable for the following components:    CREATININE 1.6 (*)     GFR Non- 45 (*)     GFR  55 (*)     All other components within normal limits   PROTIME-INR   APTT       All other labs were within normal range or not returned as of this dictation. EMERGENCY DEPARTMENT COURSE and DIFFERENTIALDIAGNOSIS/MDM:   Vitals:    Vitals:    02/08/21 1430 02/08/21 1500 02/08/21 1530 02/08/21 1600   BP: 117/87 (!) 116/91 111/87 125/87   Pulse: 75 73 76 75   Resp: 20 17 19 17   Temp:    98.4 °F (36.9 °C)   TempSrc:       SpO2: 97% 97% 96% 97%   Weight:       Height:           MDM  Number of Diagnoses or Management Options  Acute deep vein thrombosis (DVT) of popliteal vein of right lower extremity (HCC)  Renal insufficiency  Diagnosis management comments: I was able to get a pulmonary report at the time of patient care that showed DVT in the popliteal vein. In the posterior tibial vein and the popliteal vein no SVT was seen. Lab work was reassuring. I discussed the patient's the risks benefits alternatives and consequences to treatment of his DVT. Including blood thinners. He states she does not have insurance right now but does not think that will be a problem for follow-up or medications. I have chosen Xarelto with 15 mg twice a day for the first 21 days and then he needs to get a prescription for the 20 mg a day from then on out and duration to be determined with his clinician. I suggest he have a follow-up within a couple weeks and may be a repeat scan at that time. Also encouraged to return if he had any chest symptoms or worsening symptoms. Also precautions about bleeding on blood thinners. His creatinine was slightly elevated and I discussed with him the avoidance of anti-inflammatory medication and also the need for follow-up of his renal labs. CONSULTS:  None    PROCEDURES:  Unless otherwise notedbelow, none     Procedures    FINAL IMPRESSION     1. Acute deep vein thrombosis (DVT) of popliteal vein of right lower extremity (HCC)    2.  Renal insufficiency          DISPOSITION/PLAN   DISPOSITION Decision To Discharge 02/08/2021 03:19:28 PM      PATIENT REFERRED TO:  @FUP@    DISCHARGE MEDICATIONS:  Discharge Medication List as of 2/8/2021  3:26 PM      START taking these medications    Details   rivaroxaban (XARELTO) 15 MG TABS tablet Take 1 tablet by mouth 2 times daily (with meals) For 21 days. , Disp-42 tablet, R-0Print                (Please note that portions of this note were completed with a voice recognition program.  Efforts were made to edit the dictations butoccasionally words are mis-transcribed.)    Li Leung MD (electronically signed)  AttendingEmergency Physician          Al Gonzalez MD  02/08/21 2035

## 2021-02-08 NOTE — PROGRESS NOTES
[facility]  [address] [address] [place] [address]  Dept: [phone]  Dept Fax: [phone]  Loc: [phone]    Nanci Jorge is a 50 y.o. male who presents today for his medical conditions/complaintsas noted below. Nanci Jorge is c/o of Leg Swelling (right leg for 3-5 days), Leg Pain, Toe Pain, and Rib Pain (injury) (bilateral)        HPI:     Leg Pain   Incident onset: x3-4 days. There was no injury mechanism. The pain is present in the right leg, right toes and right foot. The pain is moderate. The pain has been constant since onset. Pertinent negatives include no loss of sensation, numbness or tingling. Associated symptoms comments: Limping gait. The symptoms are aggravated by palpation, weight bearing and movement. He has tried elevation and rest for the symptoms. The treatment provided no relief. Past Medical History:   Diagnosis Date    Bleeds easily [facility])     ever since colon surgery    Headache(784.0)     Hyperlipidemia     mild; pcp never called in meds/no meds.      Kidney stone     hx of    S/P cardiac cath 07/14/2011    Thyroid disease     Ventral hernia      Past Surgical History:   Procedure Laterality Date    ANKLE SURGERY      chip, fracture, and then tendon repair; surgery x 2    CARDIAC CATHETERIZATION  2011    dr. yarbrough/normal/throught the wrist    CHOLECYSTECTOMY      COLON SURGERY      polyps that ruptured, colostomy takedown    GALLBLADDER SURGERY      HERNIA REPAIR N/A 2/21/2020    ROBOTIC ASSISTED LAPAROSCOPIC VENTRAL HERNIA REPAIR TIMES TWO performed by David Langston DO at [address] [place] [address] SURGERY  96    KNEE ARTHROSCOPY  07       Family History   Problem Relation Age of Onset    Cancer Mother         lung     Diabetes Mother     Coronary Art Dis Father     Cancer Father         lung    Hypertension Father     Heart Attack Father 43        with CHF       Social History     Tobacco Use  Smoking status: Never Smoker    Smokeless tobacco: Current User     Types: Snuff    Tobacco comment: dips one can every 3 days. Substance Use Topics    Alcohol use: No      Current Outpatient Medications   Medication Sig Dispense Refill    levothyroxine (SYNTHROID) 25 MCG tablet Take 25 mcg by mouth Daily Indications: Underactive Thyroid       No current facility-administered medications for this visit. Allergies   Allergen Reactions    Reglan [Metoclopramide Hcl] Shortness Of Breath     \"cuts my breathing off\"    Allopurinol Rash    Cephalexin Rash     Keflex causes a rash       Health Maintenance   Topic Date Due    Hepatitis C screen  1965    HIV screen  04/10/1982    DTaP/Tdap/Td vaccine (1 - Tdap) 04/10/1986    Lipid screen  04/10/2007    Diabetes screen  04/10/2007    Shingles Vaccine (1 of 2) 04/10/2017    Colon cancer screen colonoscopy  04/10/2017    Flu vaccine (1) 09/01/2020    Hepatitis A vaccine  Aged Out    Hepatitis B vaccine  Aged Out    Hib vaccine  Aged Out    Meningococcal (ACWY) vaccine  Aged Out    Pneumococcal 0-64 years Vaccine  Aged Out       Subjective:     Review of Systems   Constitutional: Negative for fever. HENT: Negative for congestion and sore throat. Eyes: Negative. Respiratory: Negative for chest tightness, shortness of breath and wheezing. Cardiovascular: Negative for chest pain and palpitations. Gastrointestinal: Negative for constipation, diarrhea, nausea and vomiting. Endocrine: Negative. Genitourinary: Negative. Musculoskeletal: Positive for gait problem and myalgias. Skin: Positive for color change. Neurological: Negative for dizziness, tingling, speech difficulty, weakness and numbness. Psychiatric/Behavioral: Negative for confusion. All other systems reviewed and are negative. Objective:     Physical Exam  Vitals signs and nursing note reviewed.    Constitutional: General: He is not in acute distress. Appearance: Normal appearance. He is not ill-appearing or toxic-appearing. HENT:      Head: Normocephalic and atraumatic. Right Ear: External ear normal.      Left Ear: External ear normal.   Eyes:      Extraocular Movements: Extraocular movements intact. Conjunctiva/sclera: Conjunctivae normal.      Pupils: Pupils are equal, round, and reactive to light. Cardiovascular:      Rate and Rhythm: Normal rate and regular rhythm. Pulses:           Dorsalis pedis pulses are detected w/ Doppler on the right side. Posterior tibial pulses are detected w/ Doppler on the right side. Heart sounds: Normal heart sounds. No murmur. Comments: +Homans sign right calf. Pulmonary:      Effort: Pulmonary effort is normal. No respiratory distress. Breath sounds: Normal breath sounds. No wheezing or rales. Musculoskeletal:         General: No swelling or signs of injury. Right lower leg: He exhibits tenderness and swelling. Legs:    Feet:      Comments: Right foot edematous. Cap refill WNL. Toes appear pale compared to foot; sensation intact. Skin:     General: Skin is warm and dry. Neurological:      General: No focal deficit present. Mental Status: He is alert and oriented to person, place, and time. Motor: No weakness. Psychiatric:         Mood and Affect: Mood normal.       BP (!) 132/92   Pulse 87   Temp 97.1 °F (36.2 °C) (Temporal)   Resp 18   Wt 240 lb (108.9 kg)   SpO2 100%   BMI 32.55 kg/m²     Assessment:       Diagnosis Orders   1. Calf swelling  VL DUP LOWER EXTREMITY VENOUS RIGHT   2.  Right calf pain  VL DUP LOWER EXTREMITY VENOUS RIGHT       Plan:      Orders Placed This Encounter   Procedures    VL DUP LOWER EXTREMITY VENOUS RIGHT     Standing Status:   Future     Number of Occurrences:   1     Standing Expiration Date:   2/8/2022     Order Specific Question:   Reason for exam: Answer:   right calf swelling pain +homans sign     US scheduled for 1030. Patient has large DVT in right popliteal extending into posterior tibial and peroneal veins. Patient sent to ER for higher level of care. Report given to Shanthi Forbes in ED. No follow-ups on file. No orders of the defined types were placed in this encounter. Patient given educational materials- see patient instructions. Discussed use, benefit, and side effects of prescribedmedications. All patient questions answered. Pt voiced understanding. Reviewedhealth maintenance. Instructed to continue current medications, diet and exercise. Patient agreed with treatment plan. Follow up as directed. There are no Patient Instructions on file for this visit.       Electronically signed by JON Arenas CNP on 2/8/2021 at 11:35 AM

## 2021-02-23 ENCOUNTER — APPOINTMENT (OUTPATIENT)
Dept: CT IMAGING | Age: 56
DRG: 134 | End: 2021-02-23
Payer: MEDICAID

## 2021-02-23 ENCOUNTER — HOSPITAL ENCOUNTER (INPATIENT)
Age: 56
LOS: 3 days | Discharge: HOME | DRG: 134 | End: 2021-02-26
Attending: EMERGENCY MEDICINE | Admitting: FAMILY MEDICINE
Payer: MEDICAID

## 2021-02-23 DIAGNOSIS — I26.99 ACUTE PULMONARY EMBOLISM, UNSPECIFIED PULMONARY EMBOLISM TYPE, UNSPECIFIED WHETHER ACUTE COR PULMONALE PRESENT (HCC): Primary | ICD-10-CM

## 2021-02-23 DIAGNOSIS — Z86.718 HISTORY OF DVT (DEEP VEIN THROMBOSIS): ICD-10-CM

## 2021-02-23 PROBLEM — I82.409 LEG DVT (DEEP VENOUS THROMBOEMBOLISM), ACUTE, UNSPECIFIED LATERALITY (HCC): Status: ACTIVE | Noted: 2021-02-23

## 2021-02-23 PROBLEM — N18.30 STAGE 3 CHRONIC KIDNEY DISEASE (HCC): Status: ACTIVE | Noted: 2021-02-23

## 2021-02-23 LAB
ALBUMIN SERPL-MCNC: 4 G/DL (ref 3.5–5.2)
ALP BLD-CCNC: 87 U/L (ref 40–130)
ALT SERPL-CCNC: 20 U/L (ref 5–41)
ANION GAP SERPL CALCULATED.3IONS-SCNC: 9 MMOL/L (ref 7–19)
APTT: 41.3 SEC (ref 26–36.2)
AST SERPL-CCNC: 17 U/L (ref 5–40)
BASOPHILS ABSOLUTE: 0.1 K/UL (ref 0–0.2)
BASOPHILS RELATIVE PERCENT: 1 % (ref 0–1)
BILIRUB SERPL-MCNC: 0.7 MG/DL (ref 0.2–1.2)
BUN BLDV-MCNC: 13 MG/DL (ref 6–20)
CALCIUM SERPL-MCNC: 9.1 MG/DL (ref 8.6–10)
CHLORIDE BLD-SCNC: 101 MMOL/L (ref 98–111)
CO2: 28 MMOL/L (ref 22–29)
CREAT SERPL-MCNC: 1.4 MG/DL (ref 0.5–1.2)
EOSINOPHILS ABSOLUTE: 0.2 K/UL (ref 0–0.6)
EOSINOPHILS RELATIVE PERCENT: 1.6 % (ref 0–5)
GFR AFRICAN AMERICAN: >59
GFR NON-AFRICAN AMERICAN: 53
GLUCOSE BLD-MCNC: 141 MG/DL (ref 74–109)
HCT VFR BLD CALC: 42.4 % (ref 42–52)
HEMOGLOBIN: 14.6 G/DL (ref 14–18)
IMMATURE GRANULOCYTES #: 0.1 K/UL
INR BLD: 1.68 (ref 0.88–1.18)
LYMPHOCYTES ABSOLUTE: 2.1 K/UL (ref 1.1–4.5)
LYMPHOCYTES RELATIVE PERCENT: 21.5 % (ref 20–40)
MAGNESIUM: 2.1 MG/DL (ref 1.6–2.6)
MCH RBC QN AUTO: 34.4 PG (ref 27–31)
MCHC RBC AUTO-ENTMCNC: 34.4 G/DL (ref 33–37)
MCV RBC AUTO: 99.8 FL (ref 80–94)
MONOCYTES ABSOLUTE: 0.7 K/UL (ref 0–0.9)
MONOCYTES RELATIVE PERCENT: 6.8 % (ref 0–10)
NEUTROPHILS ABSOLUTE: 6.8 K/UL (ref 1.5–7.5)
NEUTROPHILS RELATIVE PERCENT: 68.6 % (ref 50–65)
PDW BLD-RTO: 11.8 % (ref 11.5–14.5)
PLATELET # BLD: 350 K/UL (ref 130–400)
PMV BLD AUTO: 9.6 FL (ref 9.4–12.4)
POTASSIUM REFLEX MAGNESIUM: 3.5 MMOL/L (ref 3.5–5)
PRO-BNP: 38 PG/ML (ref 0–900)
PROTHROMBIN TIME: 20 SEC (ref 12–14.6)
RBC # BLD: 4.25 M/UL (ref 4.7–6.1)
SARS-COV-2, NAAT: NOT DETECTED
SODIUM BLD-SCNC: 138 MMOL/L (ref 136–145)
TOTAL PROTEIN: 7.9 G/DL (ref 6.6–8.7)
TROPONIN: <0.01 NG/ML (ref 0–0.03)
WBC # BLD: 9.9 K/UL (ref 4.8–10.8)

## 2021-02-23 PROCEDURE — 84484 ASSAY OF TROPONIN QUANT: CPT

## 2021-02-23 PROCEDURE — 93971 EXTREMITY STUDY: CPT

## 2021-02-23 PROCEDURE — 87635 SARS-COV-2 COVID-19 AMP PRB: CPT

## 2021-02-23 PROCEDURE — 83735 ASSAY OF MAGNESIUM: CPT

## 2021-02-23 PROCEDURE — 80053 COMPREHEN METABOLIC PANEL: CPT

## 2021-02-23 PROCEDURE — 6360000002 HC RX W HCPCS: Performed by: EMERGENCY MEDICINE

## 2021-02-23 PROCEDURE — 71275 CT ANGIOGRAPHY CHEST: CPT

## 2021-02-23 PROCEDURE — 6360000004 HC RX CONTRAST MEDICATION: Performed by: PHYSICIAN ASSISTANT

## 2021-02-23 PROCEDURE — 99284 EMERGENCY DEPT VISIT MOD MDM: CPT

## 2021-02-23 PROCEDURE — 1210000000 HC MED SURG R&B

## 2021-02-23 PROCEDURE — 36415 COLL VENOUS BLD VENIPUNCTURE: CPT

## 2021-02-23 PROCEDURE — 83880 ASSAY OF NATRIURETIC PEPTIDE: CPT

## 2021-02-23 PROCEDURE — 85730 THROMBOPLASTIN TIME PARTIAL: CPT

## 2021-02-23 PROCEDURE — 85610 PROTHROMBIN TIME: CPT

## 2021-02-23 PROCEDURE — 93005 ELECTROCARDIOGRAM TRACING: CPT | Performed by: PHYSICIAN ASSISTANT

## 2021-02-23 PROCEDURE — 85025 COMPLETE CBC W/AUTO DIFF WBC: CPT

## 2021-02-23 RX ORDER — HEPARIN SODIUM 1000 [USP'U]/ML
80 INJECTION, SOLUTION INTRAVENOUS; SUBCUTANEOUS PRN
Status: DISCONTINUED | OUTPATIENT
Start: 2021-02-23 | End: 2021-02-24 | Stop reason: ALTCHOICE

## 2021-02-23 RX ORDER — SODIUM CHLORIDE 0.9 % (FLUSH) 0.9 %
10 SYRINGE (ML) INJECTION PRN
Status: DISCONTINUED | OUTPATIENT
Start: 2021-02-23 | End: 2021-02-26 | Stop reason: HOSPADM

## 2021-02-23 RX ORDER — ONDANSETRON 2 MG/ML
4 INJECTION INTRAMUSCULAR; INTRAVENOUS EVERY 6 HOURS PRN
Status: DISCONTINUED | OUTPATIENT
Start: 2021-02-23 | End: 2021-02-26 | Stop reason: HOSPADM

## 2021-02-23 RX ORDER — PROMETHAZINE HYDROCHLORIDE 12.5 MG/1
12.5 TABLET ORAL EVERY 6 HOURS PRN
Status: DISCONTINUED | OUTPATIENT
Start: 2021-02-23 | End: 2021-02-26 | Stop reason: HOSPADM

## 2021-02-23 RX ORDER — SODIUM CHLORIDE 0.9 % (FLUSH) 0.9 %
10 SYRINGE (ML) INJECTION EVERY 12 HOURS SCHEDULED
Status: DISCONTINUED | OUTPATIENT
Start: 2021-02-23 | End: 2021-02-26 | Stop reason: HOSPADM

## 2021-02-23 RX ORDER — HEPARIN SODIUM 1000 [USP'U]/ML
40 INJECTION, SOLUTION INTRAVENOUS; SUBCUTANEOUS PRN
Status: DISCONTINUED | OUTPATIENT
Start: 2021-02-23 | End: 2021-02-24 | Stop reason: ALTCHOICE

## 2021-02-23 RX ORDER — POLYETHYLENE GLYCOL 3350 17 G/17G
17 POWDER, FOR SOLUTION ORAL DAILY PRN
Status: DISCONTINUED | OUTPATIENT
Start: 2021-02-23 | End: 2021-02-26 | Stop reason: HOSPADM

## 2021-02-23 RX ORDER — HEPARIN SODIUM 10000 [USP'U]/100ML
5-30 INJECTION, SOLUTION INTRAVENOUS CONTINUOUS
Status: DISCONTINUED | OUTPATIENT
Start: 2021-02-23 | End: 2021-02-24 | Stop reason: ALTCHOICE

## 2021-02-23 RX ORDER — ACETAMINOPHEN 650 MG/1
650 SUPPOSITORY RECTAL EVERY 6 HOURS PRN
Status: DISCONTINUED | OUTPATIENT
Start: 2021-02-23 | End: 2021-02-26 | Stop reason: HOSPADM

## 2021-02-23 RX ORDER — HEPARIN SODIUM 1000 [USP'U]/ML
5000 INJECTION, SOLUTION INTRAVENOUS; SUBCUTANEOUS ONCE
Status: COMPLETED | OUTPATIENT
Start: 2021-02-23 | End: 2021-02-23

## 2021-02-23 RX ORDER — ACETAMINOPHEN 325 MG/1
650 TABLET ORAL EVERY 6 HOURS PRN
Status: DISCONTINUED | OUTPATIENT
Start: 2021-02-23 | End: 2021-02-26 | Stop reason: HOSPADM

## 2021-02-23 RX ADMIN — HEPARIN SODIUM 18 UNITS/KG/HR: 10000 INJECTION, SOLUTION INTRAVENOUS at 19:30

## 2021-02-23 RX ADMIN — HEPARIN SODIUM 5000 UNITS: 1000 INJECTION INTRAVENOUS; SUBCUTANEOUS at 19:31

## 2021-02-23 RX ADMIN — IOPAMIDOL 90 ML: 755 INJECTION, SOLUTION INTRAVENOUS at 16:42

## 2021-02-23 ASSESSMENT — PAIN SCALES - GENERAL: PAINLEVEL_OUTOF10: 10

## 2021-02-23 ASSESSMENT — PAIN DESCRIPTION - LOCATION: LOCATION: LEG

## 2021-02-23 ASSESSMENT — PAIN DESCRIPTION - PAIN TYPE: TYPE: ACUTE PAIN

## 2021-02-23 NOTE — ED PROVIDER NOTES
[facility] 4 ONCOLOGY UNIT  eMERGENCYdEPARTMENT eNCOUnter      Pt Name: Smitha Marroquin  MRN: [id number]  [place] 1965  Date of evaluation: 2/23/2021  Provider:AMELIE Bae    CHIEF COMPLAINT       Chief Complaint   Patient presents with    Leg Pain     blood clot to R leg on xeralto         HISTORY OF PRESENT ILLNESS  (Location/Symptom, Timing/Onset, Context/Setting, Quality, Duration, Modifying Factors, Severity.)   Smitha Marroquin is a 50 y.o. male who presents to the emergency department with complaints of acute onset right calf ankle pain started last night known DVT in the popliteal space diagnosed 2 to 3 weeks ago with ultrasound currently on Xarelto. Patient also complaining of a pleuritic component starting about a week ago no prior PE. He admits to dyspnea on exertion no cough no fever. The ankle is extremely tender swollen in comparison with warmth but no discoloration. Rates pain in his ankle is a 10 out of 10. HPI    Nursing Notes were reviewed and I agree. REVIEW OF SYSTEMS    (2-9 systems for level 4, 10 or more for level 5)     Review of Systems   Constitutional: Negative for activity change, appetite change, chills and fever. HENT: Negative for congestion and dental problem. Eyes: Negative for photophobia, discharge and itching. Respiratory: Negative for apnea, cough and shortness of breath. Acute onset pleurisy   Cardiovascular: Negative for chest pain. Musculoskeletal: Positive for arthralgias, joint swelling and myalgias. Negative for back pain, gait problem and neck pain. Skin: Negative for color change, pallor and rash. Neurological: Negative for dizziness, seizures and syncope. Psychiatric/Behavioral: Negative for agitation. The patient is not nervous/anxious. Except as noted above the remainder of the review of systems was reviewed and negative.        PAST MEDICAL HISTORY     Past Medical History:   Diagnosis Date    Bleeds easily [facility])     ever since colon surgery    Headache(784.0)     Hyperlipidemia     mild; pcp never called in meds/no meds.  Kidney stone     hx of    S/P cardiac cath 07/14/2011    Thyroid disease     Ventral hernia          SURGICAL HISTORY       Past Surgical History:   Procedure Laterality Date    ANKLE SURGERY      chip, fracture, and then tendon repair; surgery x 2    CARDIAC CATHETERIZATION  2011    dr. yarbrough/normal/throught the wrist    CHOLECYSTECTOMY      COLON SURGERY      polyps that ruptured, colostomy takedown    GALLBLADDER SURGERY      HERNIA REPAIR N/A 2/21/2020    ROBOTIC ASSISTED LAPAROSCOPIC VENTRAL HERNIA REPAIR TIMES TWO performed by Rice Litten, DO at [address] [place] [address] ARTHROSCOPY  07         CURRENT MEDICATIONS       Current Discharge Medication List      CONTINUE these medications which have NOT CHANGED    Details   rivaroxaban (XARELTO) 15 MG TABS tablet Take 1 tablet by mouth 2 times daily (with meals) For 21 days. Qty: 42 tablet, Refills: 0             ALLERGIES     Reglan [metoclopramide hcl], Allopurinol, and Cephalexin    FAMILY HISTORY       Family History   Problem Relation Age of Onset   Heather Abler Cancer Mother         lung     Diabetes Mother     Coronary Art Dis Father     Cancer Father         lung    Hypertension Father     Heart Attack Father 43        with CHF          SOCIAL HISTORY       Social History     Socioeconomic History    Marital status:      Spouse name: None    Number of children: None    Years of education: None    Highest education level: None   Occupational History    None   Social Needs    Financial resource strain: None    Food insecurity     Worry: None     Inability: None    Transportation needs     Medical: None     Non-medical: None   Tobacco Use    Smoking status: Never Smoker    Smokeless tobacco: Current User     Types: Snuff    Tobacco comment: dips one can every 3 days.     Substance and Sexual Activity    Alcohol use: No    Drug use: No    Sexual activity: None   Lifestyle    Physical activity     Days per week: None     Minutes per session: None    Stress: None   Relationships    Social connections     Talks on phone: None     Gets together: None     Attends Mosque service: None     Active member of club or organization: None     Attends meetings of clubs or organizations: None     Relationship status: None    Intimate partner violence     Fear of current or ex partner: None     Emotionally abused: None     Physically abused: None     Forced sexual activity: None   Other Topics Concern    None   Social History Narrative    None       SCREENINGS    [place] Coma Scale  Eye Opening: Spontaneous  Best Verbal Response: Oriented  Best Motor Response: Obeys commands  [place] Coma Scale Score: 15      PHYSICAL EXAM    (up to 7 forlevel 4, 8 or more for level 5)     ED Triage Vitals   BP Temp Temp src Pulse Resp SpO2 Height Weight   02/23/21 1500 02/23/21 1459 -- 02/23/21 1500 02/23/21 1500 02/23/21 1500 -- 02/23/21 1459   126/86 98.2 °F (36.8 °C)  104 18 95 %  220 lb (99.8 kg)       Physical Exam  Constitutional:       General: He is not in acute distress. Appearance: He is well-developed. He is not diaphoretic. HENT:      Head: Normocephalic and atraumatic. Right Ear: External ear normal.      Left Ear: External ear normal.   Eyes:      Pupils: Pupils are equal, round, and reactive to light. Neck:      Musculoskeletal: Normal range of motion and neck supple. Trachea: No tracheal deviation. Cardiovascular:      Rate and Rhythm: Normal rate and regular rhythm. Pulses: Normal pulses. Heart sounds: Normal heart sounds. No murmur. Comments: Patient complaining of anterior bilateral pain with inspiration nothing appreciated on ascultation seems in no distress  Pulmonary:      Effort: Pulmonary effort is normal.      Breath sounds: Normal breath sounds. No stridor. No wheezing. Chest:      Chest wall: No tenderness. Abdominal:      General: Bowel sounds are normal. There is no distension. Palpations: Abdomen is soft. Tenderness: There is no abdominal tenderness. Musculoskeletal: Normal range of motion. Skin:     General: Skin is warm and dry. Capillary Refill: Capillary refill takes less than 2 seconds. Neurological:      Mental Status: He is alert and oriented to person, place, and time. Psychiatric:         Behavior: Behavior normal.           DIAGNOSTIC RESULTS     RADIOLOGY:   Non-plain film images such as CT, Ultrasound and MRI are read by the radiologist. Plain radiographic images are visualized and preliminarilyinterpreted by Lori Hernandez DO with the below findings:    Interpretation per the Radiologist below, if available at the time of this note:    CTA PULMONARY W CONTRAST   Final Result   1. Pulmonary embolus in the right lower lobe. No right heart strain.    2.  Result discussed with Dr. Lakeshia Barry 2/23/2021 at 5:19 PM   Signed by Dr Mikael Felton on 2/23/2021 5:21 PM      VL Extremity Venous Right   Final Result          LABS:  Labs Reviewed   PROTIME-INR - Abnormal; Notable for the following components:       Result Value    Protime 20.0 (*)     INR 1.68 (*)     All other components within normal limits   APTT - Abnormal; Notable for the following components:    aPTT 41.3 (*)     All other components within normal limits   CBC WITH AUTO DIFFERENTIAL - Abnormal; Notable for the following components:    RBC 4.25 (*)     MCV 99.8 (*)     MCH 34.4 (*)     Neutrophils % 68.6 (*)     All other components within normal limits   COMPREHENSIVE METABOLIC PANEL W/ REFLEX TO MG FOR LOW K - Abnormal; Notable for the following components:    Glucose 141 (*)     CREATININE 1.4 (*)     GFR Non- 53 (*)     All other components within normal limits   APTT - Abnormal; Notable for the following components:    aPTT 122.4 (*)     All other components within normal limits    Narrative:     Farhad Montes tel. [phone],  Coag results called to and read back by [facility], [facility] RN on 4th, 02/24/2021 01:18, by  [facility]   APTT - Abnormal; Notable for the following components:    aPTT 89.3 (*)     All other components within normal limits    Narrative:     Farhad Montes tel. [phone],  Coag results called to and read back by [facility] RN 4TH, 02/24/2021 08:14, by  Gerardo Holman has been rescheduled by [facility] at 02/24/2021 05:05 Reason: Do   with 0700 ptt per ina denton   CBC WITH AUTO DIFFERENTIAL - Abnormal; Notable for the following components:    RBC 4.16 (*)     Hematocrit 41.5 (*)     MCV 99.8 (*)     MCH 33.9 (*)     All other components within normal limits   BASIC METABOLIC PANEL W/ REFLEX TO MG FOR LOW K - Abnormal; Notable for the following components:    Glucose 114 (*)     CREATININE 1.4 (*)     GFR Non- 53 (*)     All other components within normal limits    Narrative:     Collection has been rescheduled by [facility] at 02/24/2021 05:05 Reason: Do   with 0700 ptt per rn bertin   TSH WITH REFLEX TO FT4 - Abnormal; Notable for the following components:    TSH Reflex FT4 6.78 (*)     All other components within normal limits    Narrative:     Collection has been rescheduled by [facility] at 02/24/2021 05:05 Reason: Do   with 0700 ptt per ina denton   COVID-19, RAPID   TROPONIN   BRAIN NATRIURETIC PEPTIDE   MAGNESIUM   SPECIMEN REJECTION   T4, FREE    Narrative:     Collection has been rescheduled by [facility] at 02/24/2021 05:05 Reason: Do   with 0700 ptt per rn bertin   LUPUS ANTICOAGULANT   CARDIOLIPIN ANTIBODIES IGG & IGM   BETA-2 GLYCOPROTEIN ANTIBODIES       All other labs were within normal range or notreturned as of this dictation.     RE-ASSESSMENT        EMERGENCY DEPARTMENT COURSE and DIFFERENTIAL DIAGNOSIS/MDM:   Vitals:    Vitals:    02/23/21 1500 02/23/21 2046 02/23/21 2051 02/24/21 1115   BP: 126/86 117/68  120/72   Pulse: 104 89  87   Resp: 18 20  18 Temp:  98.7 °F (37.1 °C)  98.5 °F (36.9 °C)   TempSrc:  Temporal  Temporal   SpO2: 95% 94%  95%   Weight:       Height:   6' (1.829 m)        MDM  Today the DVT report suggests that there are new onset clots in his leg including peroneal and soleal.  I have made my attending aware of this I am going off shift at this time CTA is pending this is a guarded prognosis potential for admission with IV heparin potential if new PE findings. PROCEDURES:    Procedures      FINAL IMPRESSION      1. Acute pulmonary embolism, unspecified pulmonary embolism type, unspecified whether acute cor pulmonale present ([facility] [address]    2. History of DVT (deep vein thrombosis)          DISPOSITION/PLAN   DISPOSITION Admitted 02/23/2021 06:56:23 PM      PATIENT REFERRED TO:  No follow-up provider specified.     DISCHARGE MEDICATIONS:  Current Discharge Medication List          (Please note that portions of this note were completed with a voice recognition program.  Efforts were made to edit the dictations but occasionallywords are mis-transcribed.)    Jennifer Gray [address] [place] [address]  02/24/21 5706

## 2021-02-23 NOTE — PROGRESS NOTES
Vascular lab preliminary. Right leg venous scan completed.  + DVT in the right popliteal, ptv, peroneal and soleal veins. Final report pending.

## 2021-02-24 LAB
ANION GAP SERPL CALCULATED.3IONS-SCNC: 11 MMOL/L (ref 7–19)
APTT: 122.4 SEC (ref 26–36.2)
APTT: 89.3 SEC (ref 26–36.2)
BASOPHILS ABSOLUTE: 0.1 K/UL (ref 0–0.2)
BASOPHILS RELATIVE PERCENT: 0.9 % (ref 0–1)
BUN BLDV-MCNC: 14 MG/DL (ref 6–20)
CALCIUM SERPL-MCNC: 9.2 MG/DL (ref 8.6–10)
CHLORIDE BLD-SCNC: 101 MMOL/L (ref 98–111)
CO2: 26 MMOL/L (ref 22–29)
CREAT SERPL-MCNC: 1.4 MG/DL (ref 0.5–1.2)
EKG P AXIS: 45 DEGREES
EKG P-R INTERVAL: 164 MS
EKG Q-T INTERVAL: 366 MS
EKG QRS DURATION: 104 MS
EKG QTC CALCULATION (BAZETT): 397 MS
EKG T AXIS: 45 DEGREES
EOSINOPHILS ABSOLUTE: 0.2 K/UL (ref 0–0.6)
EOSINOPHILS RELATIVE PERCENT: 1.8 % (ref 0–5)
GFR AFRICAN AMERICAN: >59
GFR NON-AFRICAN AMERICAN: 53
GLUCOSE BLD-MCNC: 114 MG/DL (ref 74–109)
HCT VFR BLD CALC: 41.5 % (ref 42–52)
HEMOGLOBIN: 14.1 G/DL (ref 14–18)
IMMATURE GRANULOCYTES #: 0 K/UL
LV EF: 60 %
LVEF MODALITY: NORMAL
LYMPHOCYTES ABSOLUTE: 3 K/UL (ref 1.1–4.5)
LYMPHOCYTES RELATIVE PERCENT: 28.7 % (ref 20–40)
MCH RBC QN AUTO: 33.9 PG (ref 27–31)
MCHC RBC AUTO-ENTMCNC: 34 G/DL (ref 33–37)
MCV RBC AUTO: 99.8 FL (ref 80–94)
MONOCYTES ABSOLUTE: 0.7 K/UL (ref 0–0.9)
MONOCYTES RELATIVE PERCENT: 7 % (ref 0–10)
NEUTROPHILS ABSOLUTE: 6.3 K/UL (ref 1.5–7.5)
NEUTROPHILS RELATIVE PERCENT: 61.2 % (ref 50–65)
PDW BLD-RTO: 11.7 % (ref 11.5–14.5)
PLATELET # BLD: 333 K/UL (ref 130–400)
PMV BLD AUTO: 9.9 FL (ref 9.4–12.4)
POTASSIUM REFLEX MAGNESIUM: 3.7 MMOL/L (ref 3.5–5)
RBC # BLD: 4.16 M/UL (ref 4.7–6.1)
REASON FOR REJECTION: NORMAL
REJECTED TEST: NORMAL
SODIUM BLD-SCNC: 138 MMOL/L (ref 136–145)
T4 FREE: 1.07 NG/DL (ref 0.93–1.7)
TSH REFLEX FT4: 6.78 UIU/ML (ref 0.35–5.5)
WBC # BLD: 10.3 K/UL (ref 4.8–10.8)

## 2021-02-24 PROCEDURE — 36415 COLL VENOUS BLD VENIPUNCTURE: CPT

## 2021-02-24 PROCEDURE — 80048 BASIC METABOLIC PNL TOTAL CA: CPT

## 2021-02-24 PROCEDURE — 6360000002 HC RX W HCPCS: Performed by: INTERNAL MEDICINE

## 2021-02-24 PROCEDURE — 85635 REPTILASE TEST: CPT

## 2021-02-24 PROCEDURE — 99253 IP/OBS CNSLTJ NEW/EST LOW 45: CPT | Performed by: SURGERY

## 2021-02-24 PROCEDURE — 85598 HEXAGNAL PHOSPH PLTLT NEUTRL: CPT

## 2021-02-24 PROCEDURE — 2580000003 HC RX 258: Performed by: PHYSICIAN ASSISTANT

## 2021-02-24 PROCEDURE — 85597 PHOSPHOLIPID PLTLT NEUTRALIZ: CPT

## 2021-02-24 PROCEDURE — 86146 BETA-2 GLYCOPROTEIN ANTIBODY: CPT

## 2021-02-24 PROCEDURE — 93306 TTE W/DOPPLER COMPLETE: CPT

## 2021-02-24 PROCEDURE — 85670 THROMBIN TIME PLASMA: CPT

## 2021-02-24 PROCEDURE — 85730 THROMBOPLASTIN TIME PARTIAL: CPT

## 2021-02-24 PROCEDURE — 85025 COMPLETE CBC W/AUTO DIFF WBC: CPT

## 2021-02-24 PROCEDURE — 85610 PROTHROMBIN TIME: CPT

## 2021-02-24 PROCEDURE — 84439 ASSAY OF FREE THYROXINE: CPT

## 2021-02-24 PROCEDURE — 99223 1ST HOSP IP/OBS HIGH 75: CPT | Performed by: INTERNAL MEDICINE

## 2021-02-24 PROCEDURE — 86147 CARDIOLIPIN ANTIBODY EA IG: CPT

## 2021-02-24 PROCEDURE — 85732 THROMBOPLASTIN TIME PARTIAL: CPT

## 2021-02-24 PROCEDURE — 85613 RUSSELL VIPER VENOM DILUTED: CPT

## 2021-02-24 PROCEDURE — 6370000000 HC RX 637 (ALT 250 FOR IP): Performed by: PHYSICIAN ASSISTANT

## 2021-02-24 PROCEDURE — 84443 ASSAY THYROID STIM HORMONE: CPT

## 2021-02-24 PROCEDURE — 1210000000 HC MED SURG R&B

## 2021-02-24 RX ORDER — LEVOTHYROXINE SODIUM 0.03 MG/1
25 TABLET ORAL DAILY
Status: DISCONTINUED | OUTPATIENT
Start: 2021-02-24 | End: 2021-02-26 | Stop reason: HOSPADM

## 2021-02-24 RX ADMIN — ACETAMINOPHEN 650 MG: 325 TABLET ORAL at 02:37

## 2021-02-24 RX ADMIN — ACETAMINOPHEN 650 MG: 325 TABLET ORAL at 19:32

## 2021-02-24 RX ADMIN — ENOXAPARIN SODIUM 100 MG: 100 INJECTION SUBCUTANEOUS at 09:41

## 2021-02-24 RX ADMIN — SODIUM CHLORIDE, PRESERVATIVE FREE 10 ML: 5 INJECTION INTRAVENOUS at 09:41

## 2021-02-24 RX ADMIN — SODIUM CHLORIDE, PRESERVATIVE FREE 10 ML: 5 INJECTION INTRAVENOUS at 20:18

## 2021-02-24 RX ADMIN — ENOXAPARIN SODIUM 100 MG: 100 INJECTION SUBCUTANEOUS at 20:18

## 2021-02-24 ASSESSMENT — ENCOUNTER SYMPTOMS
BACK PAIN: 0
PHOTOPHOBIA: 0
EYE DISCHARGE: 0
COUGH: 0
SHORTNESS OF BREATH: 0
APNEA: 0
COLOR CHANGE: 0
EYE ITCHING: 0

## 2021-02-24 NOTE — PROGRESS NOTES
Tubigrip Size \"D\" placed on RLE from base of toes to below knee, folded double over arch of foot. Discussed wearing the Tubigrip or compression knee high socks , 20-30mmHg compression with patient. Patient tates he has some socks at home but they are too high and he has to fold them down, making them too tight . Patient given extra lengths of Tubigrip to wear at home. Discussed frequent elevation of lower extremities and walking often, he voices understanding.

## 2021-02-24 NOTE — CARE COORDINATION
Called Tia Sharma regarding patient's lack of insurance. She plans to visit with patient today.   Electronically signed by Zia Hyatt RN on 2/24/2021 at 7:56 AM

## 2021-02-24 NOTE — PROGRESS NOTES
[facility]    Patient:  Lauren Saravia  YOB: 1965  Date of Service: 2/24/2021  MRN: [id number]   Acct: [de-identified]   Primary Care Physician: James Booker DO  Advance Directive: Full Code  Admit Date: 2/23/2021       Hospital Day: 1  Portions of this note have been copied forward, however, changed to reflect the most current clinical status of this patient. CHIEF COMPLAINT RLE pain    SUBJECTIVE:  Mr. Tera Henderson has no new complaints. Hopeful for discharge tomorrow    Cumulative Hospital Course: The patient is a 50 y.o. male who presented to [address] [place] [address] ED complaining of pain in the right lower extremity that started in the evening of 02/23/2021 and has been constant. He was diagnosed with DVT on 02/08/2021 and was started on Xarelto. He described pain 10/10 in his right ankle. Stated he has been immobile for about 3 weeks due to pain in right lower extremity. Denied recent trips. Does not smoke. Has not been diagnosed with COVID. No known family history of clotting/bleeding disorders. Repeat US again revealed subacute DVT in right lower extremity involving popliteal, peroneal, posterior tibial, and soleal veins. CTA revealed pulmonary embolus in the right lower lobe. No heart strain. Patient admitted to Hospitalist service on Heparin gtt, transitioned to Lovenox. Hematology and Vascular surgery consults placed. Recommendations to continue Lovenox 1mg/kg every 12 hours x 3 weeks with outpatient thrombophilia work up. Review of Systems:   14 point review of systems is negative except as specifically addressed above.     Objective:   VITALS:  /68   Pulse 89   Temp 98.7 °F (37.1 °C) (Temporal)   Resp 20   Ht 6' (1.829 m)   Wt 220 lb (99.8 kg)   SpO2 94%   BMI 29.84 kg/m²   24HR INTAKE/OUTPUT:      Intake/Output Summary (Last 24 hours) at 2/24/2021 1031  Last data filed at 2/23/2021 2221  Gross per 24 hour   Intake --   Output 200 ml   Net -200 ml     General appearance: alert, appears stated age, cooperative and no distress  Head: Normocephalic, without obvious abnormality, atraumatic  Eyes: conjunctivae/corneas clear. PERRL, EOM's intact. Ears: normal external ears and nose, throat without exudate  Neck: no adenopathy, no carotid bruit, no JVD, supple, symmetrical, trachea midline   Lungs: clear to auscultation bilaterally,no rales or wheezes   Heart: regular rate and rhythm, S1, S2 normal, no murmur  Abdomen:soft, non-tender; non-distended, normal bowel sounds no masses, no organomegaly  Extremities:Right ankle edema,  No erythema, no tenderness to palpation  Skin: Skin color, texture, turgor normal. No rashes or lesions  Lymphatic: No palpable lymph node enlargment  Neurologic: Alert and oriented X 3, normal strength and tone. No focal deficits  Psychiatric: Alert and oriented, thought content appropriate, normal insight, mood appropriate    Medications:      enoxaparin  1 mg/kg Subcutaneous BID    sodium chloride flush  10 mL Intravenous 2 times per day     sodium chloride flush, promethazine **OR** ondansetron, polyethylene glycol, acetaminophen **OR** acetaminophen  Diet NPO, After Midnight     Lab and other Data:     Recent Labs     02/23/21  1515 02/24/21  0049   WBC 9.9 10.3   HGB 14.6 14.1    333     Recent Labs     02/23/21  1515 02/24/21  0710    138   K 3.5 3.7    101   CO2 28 26   BUN 13 14   CREATININE 1.4* 1.4*   GLUCOSE 141* 114*     Recent Labs     02/23/21  1515   AST 17   ALT 20   BILITOT 0.7   ALKPHOS 87     Troponin T:   Recent Labs     02/23/21  1515   TROPONINI <0.01     INR:   Recent Labs     02/23/21  1515   INR 1.68*     RAD:   Vl Extremity Venous Right  Impression   There is evidence of subacute deep vein thrombosis in the right lower  extremity involving the popliteal, peroneal, posterior tibial, and soleal  veins. There is no evidence of superficial thrombophlebitis of the right lower  extremity.    Signature    Electronically signed by Papito Arnett MD(Interpreting  physician) on 02/23/2021 04:37 PM     Cta Pulmonary W Contrast  1. Pulmonary embolus in the right lower lobe. No right heart strain. 2.  Result discussed with Dr. Mayito Martini 2/23/2021 at 5:19 PM Signed by Dr Isa Beck on 2/23/2021 5:21 PM    Echo 2d W Doppler W Color W Contrast  Conclusions   Summary  Normal left ventricular chamber size and systolic function. Left ventricular ejection fraction is visually estimated at 60%. Signature    Electronically signed by Vinita Dinero DO(Interpreting  physician) on 02/24/2021 09:11 AM      Echo:   Summary   Normal left ventricular chamber size and systolic function. Left ventricular ejection fraction is visually estimated at 60%. Micro: COVID-19 not detected    Assessment/Plan   Principal Problem:    Leg DVT (deep venous thromboembolism), acute, unspecified laterality (HCC)  Active Problems:    Pulmonary embolism (HCC)    Stage 3 chronic kidney disease  Resolved Problems:    * No resolved hospital problems. *    Principal Problem:    Pulmonary embolism with known RLE DVT-Lovenox 1 mg/kg BID x 3 weeks, OP thrombophilia work up.  Appreciate assistance from Hematology/Vascular surgery     Active Problems:       Stage 3 chronic kidney disease-Monitor, appears to be at baseline         Hx of Hypothyroidism-will resume synthroid     DVT Prophylaxis: Lovenox    Rosalie Lozoya PA-C

## 2021-02-24 NOTE — VIRTUAL HEALTH
Allergies: Reglan [metoclopramide hcl], Allopurinol, and Cephalexin  Past Medical History:   Diagnosis Date    Bleeds easily ([address]     ever since colon surgery    Headache(784.0)     Hyperlipidemia     mild; pcp never called in meds/no meds.  Kidney stone     hx of    S/P cardiac cath 07/14/2011    Thyroid disease     Ventral hernia      Past Surgical History:   Procedure Laterality Date    ANKLE SURGERY      chip, fracture, and then tendon repair; surgery x 2    CARDIAC CATHETERIZATION  2011    dr. yarbrough/normal/throught the wrist    CHOLECYSTECTOMY      COLON SURGERY      polyps that ruptured, colostomy takedown    GALLBLADDER SURGERY      HERNIA REPAIR N/A 2/21/2020    ROBOTIC ASSISTED LAPAROSCOPIC VENTRAL HERNIA REPAIR TIMES TWO performed by Rosy Jorgensen DO at [facility] SURGERY  96    KNEE ARTHROSCOPY  07     Family History   Problem Relation Age of Onset    Cancer Mother         lung     Diabetes Mother     Coronary Art Dis Father     Cancer Father         lung    Hypertension Father     Heart Attack Father 43        with CHF     Social History     Tobacco Use    Smoking status: Never Smoker    Smokeless tobacco: Current User     Types: Snuff    Tobacco comment: dips one can every 3 days. Substance Use Topics    Alcohol use: No       Old records have been obtained from the referring. These records have been reviewed and summarized. Review of Systems    Constitutional - no significant activity change, appetite change, or unexpected weight change. No fever or chills. No diaphoresis or significant fatigue. HENT - no significant rhinorrhea or epistaxis. No tinnitus or significant hearing loss. Eyes - no sudden vision change or amaurosis. Respiratory - no significant shortness of breath, wheezing, or stridor. No apnea, cough, or chest tightness associated with shortness of breath. Cardiovascular - no chest pain, syncope, or significant dizziness. No palpitations. No claudication. Right lower leg edema. Gastrointestinal - no abdominal swelling or pain. No blood in stool. No severe constipation, diarrhea, nausea, or vomiting. Genitourinary - No difficulty urinating, dysuria, frequency, or urgency. No flank pain or hematuria. Musculoskeletal - no back pain, gait disturbance, or myalgia. Skin - no color change, rash, pallor, or new wound. Neurologic - no dizziness, facial asymmetry, or light headedness. No seizures. No speech difficulty or lateralizing weakness. Hematologic - no easy bruising or excessive bleeding. Psychiatric - no severe anxiety or nervousness. No confusion. All other review of systems are negative. Physical Exam    /68   Pulse 89   Temp 98.7 °F (37.1 °C) (Temporal)   Resp 20   Ht 6' (1.829 m)   Wt 220 lb (99.8 kg)   SpO2 94%   BMI 29.84 kg/m²     Constitutional - well developed, well nourished. No diaphoresis or acute distress. HENT - head normocephalic. Septum appears midline. Eyes - conjunctiva normal.  EOMS normal.  No exudate. No icterus. Neck- ROM appears normal, no tracheal deviation. Cardiovascular - Regular rate and rhythm. Heart sounds are normal.  No murmur, rub, or gallop. Carotid pulses are 2+ to palpation bilaterally without bruit. Extremities - Radial and brachial pulses are 2+ to palpation bilaterally. Right femoral pulse: present 2+; Right popliteal pulse: present 1+ Right DP: present 1+; Right PT present 1+; Left femoral pulse: present 2+; Left popliteal pulse: present 1+; Left DP: present 1+; Left PT: present 1+  No cyanosis, clubbing, right leg +1 edema. No signs atheroembolic event. Pulmonary - effort appears normal.  No respiratory distress. Lungs - Breath sounds normal. No wheezes or rales. GI - Abdomen - soft, non tender, bowel sounds X 4 quadrants. No guarding or rebound tenderness. No distension or palpable mass. Genitourinary - deferred.   Musculoskeletal - ROM appears normal.  +1edema right leg. Neurologic - alert and oriented X 3. Physiologic. Skin - warm, dry, and intact. No rash, erythema, or pallor. Psychiatric - mood, affect, and behavior appear normal.  Judgment and thought processes appear normal.     Impression Venous duplex 2-8-21        There is evidence of subacute deep vein thrombosis in the right lower    extremity involving the popliteal, peroneal, and posterior tibial veins.    There is no evidence of superficial thrombophlebitis of the right lower    extremity.        Signature        ----------------------------------------------------------------    Electronically signed by Mario Latif MD(Interpreting    physician) on 02/08/2021 05:19 PM       Impression Venous duplex 2-23-21        There is evidence of subacute deep vein thrombosis in the right lower    extremity involving the popliteal, peroneal, posterior tibial, and soleal    veins.    There is no evidence of superficial thrombophlebitis of the right lower    extremity.        Signature        ----------------------------------------------------------------    Electronically signed by Mario Latif MD(Interpreting     CTA 2-23-21  Impression   1.   Pulmonary embolus in the right lower lobe. No right heart strain. 2.  Result discussed with Dr. Rad Hernandez 2/23/2021 at 5:19 PM   Signed by Dr Jaja Go on 2/23/2021 5:21 PM        Summary ECHO cardiogram 2-24-21   Normal left ventricular chamber size and systolic function. Left ventricular ejection fraction is visually estimated at 60%. Signature      ----------------------------------------------------------------   Electronically signed by Naveen Alexandre DO(Interpreting   physician) on 02/24/2021 09:11 AM    Assessment/Plan    1. In regard to his DVT, I am not sure if there is any extension while on Xeralto, I reviewed the vascular lab studies and frankly there is not much difference.    2. In regards to the PE, it is small and

## 2021-02-24 NOTE — H&P
[facility] [place] [address] - History & Physical      PCP: Jacinda Yeung DO    Date of Admission: 2/23/2021    Date of Service: 2/23/2021    Chief Complaint:  Right lower extremity pain, dyspnea     History Of Present Illness: The patient is a 50 y.o. male who presented to [facility] ED complaining of pain in the right lower extremity that started in the evening of 02/23/2021 and has been constant. He was diagnosed with DVT on 02/08/2021 and was started on Xarelto. He describes pain 10/10 in his right ankle. States he has been immobile for about 3 weeks due to pain in right lower extremity. Denies recent trips. Does not smoke. Has not been diagnosed with COVID. No known family history of clotting/bleeding disorders. Repeat US again reveals subacute DVT in right lower extremity involving popliteal, peroneal, posterior tibial, and soleal veins. CTA revealed pulmonary embolus in the right lower lobe. No heart strain. Past Medical History:        Diagnosis Date    Bleeds easily [facility])     ever since colon surgery    Headache(784.0)     Hyperlipidemia     mild; pcp never called in meds/no meds.  Kidney stone     hx of    S/P cardiac cath 07/14/2011    Thyroid disease     Ventral hernia      Past Surgical History:        Procedure Laterality Date    ANKLE SURGERY      chip, fracture, and then tendon repair; surgery x 2    CARDIAC CATHETERIZATION  2011    dr. yarbrough/normal/throught the wrist    CHOLECYSTECTOMY      COLON SURGERY      polyps that ruptured, colostomy takedown    GALLBLADDER SURGERY      HERNIA REPAIR N/A 2/21/2020    ROBOTIC ASSISTED LAPAROSCOPIC VENTRAL HERNIA REPAIR TIMES TWO performed by Wes House DO at [address] [place] [address] ARTHROSCOPY  07     Home Medications:  Prior to Admission medications    Medication Sig Start Date End Date Taking?  Authorizing Provider   rivaroxaban (XARELTO) 15 MG TABS tablet Take 1 tablet by mouth 2 times daily (with meals) For 21 days. 2/8/21  Yes Neha Espino MD     Allergies:    Reglan [metoclopramide hcl], Allopurinol, and Cephalexin    Social History:    The patient currently lives at home. Tobacco:   reports that he has never smoked. His smokeless tobacco use includes snuff. Alcohol:   reports no history of alcohol use. Illicit Drugs: denies    Family History:      Problem Relation Age of Onset   [place] Cancer Mother         lung     Diabetes Mother     Coronary Art Dis Father     Cancer Father         lung    Hypertension Father     Heart Attack Father 43        with CHF     Review of Systems:   Constitutional / general:  Denies fever / chills / sweats  Head:  Denies headache / neck stiffness / trauma / visual change  Eyes:  Denies blurry vision / acute visual change or loss / itching / redness  ENT: Denies sore throat / hoarseness / nasal drainage / ear pain  CV:  Denies chest pain / palpitations/ orthopnea   Respiratory:  Denies cough / shortness of breath / sputum / hemoptysis  GI: Denies nausea / vomiting / abdominal pain / diarrhea / constipation  :  Denies dysuria / hesitancy / urgency / hematuria   Neuro: Denies paralysis / syncope / seizure / dysphagia / headache / paresthesias  Musculoskeletal:  Denies muscle weakness /joint stiffness /+ pain  Vascular: Denies edema / claudication / varicosities  Heme / endocrine: Denies easy bruising / bleeding / excessive sweating / heat or cold intolerance  Psychiatric:  Denies depression / anxiety / insomnia / mood changes  Skin:  Denies new rashes / lesions / skin hair or nail changes    14 point review of systems is negative except as specifically addressed above.     Physical Examination:  /86   Pulse 104   Temp 98.2 °F (36.8 °C)   Resp 18   Wt 220 lb (99.8 kg)   SpO2 95%   BMI 29.84 kg/m²   General appearance: alert, appears stated age, cooperative and no distress  Head: Normocephalic, without obvious abnormality, atraumatic  Eyes: with Dr. Nakia Velasquez 2/23/2021 at 5:19 PM Signed by Dr Akil Castaneda on 2/23/2021 5:21 PM    Assessment/Plan:  Principal Problem:    Pulmonary embolism with known RLE DVT with possible increase in clot burden-hold Xarelto-start Heparin gtt, consult Vascular surgery and Hematology for further work up    Active Problems:      Stage 3 chronic kidney disease-Monitor, appears to be at baseline        Hx of Hypothyroidism-check TSH    Further orders per clinical course/attending     Signed:  Fabiana Nunez PA-C

## 2021-02-24 NOTE — CONSULTS
Diagnosis Date    Bleeds easily [facility])     ever since colon surgery    Headache(784.0)     Hyperlipidemia     mild; pcp never called in meds/no meds.  Kidney stone     hx of    S/P cardiac cath 07/14/2011    Thyroid disease     Ventral hernia        Past Surgical History:    Past Surgical History:   Procedure Laterality Date    ANKLE SURGERY      chip, fracture, and then tendon repair; surgery x 2    CARDIAC CATHETERIZATION  2011    dr. yarbrough/normal/throught the wrist    CHOLECYSTECTOMY      COLON SURGERY      polyps that ruptured, colostomy takedown    GALLBLADDER SURGERY      HERNIA REPAIR N/A 2/21/2020    ROBOTIC ASSISTED LAPAROSCOPIC VENTRAL HERNIA REPAIR TIMES TWO performed by Reid Garcia DO at [address] [place] [address]    KNEE ARTHROSCOPY  07       Social History:    Marital status, children: No, no  Smoking status: Never smoker. Use of smokeless tobacco  ETOH status: No  Resides: Currently lives at home.   [facility] [place] [address]    Family History:   Family History   Problem Relation Age of Onset    Cancer Mother         lung     Diabetes Mother     Coronary Art Dis Father     Cancer Father         lung    Hypertension Father     Heart Attack Father 43        with CHF       Current Hospital Medications:    Current Facility-Administered Medications   Medication Dose Route Frequency Provider Last Rate Last Admin    heparin 25,000 units in dextrose 5% 250 mL (premix) infusion  5-30 Units/kg/hr Intravenous Continuous Anoop Layer, MD 16 mL/hr at 02/24/21 0236 16 Units/kg/hr at 02/24/21 0236    heparin (porcine) injection 7,980 Units  80 Units/kg Intravenous PRN Nato Workman MD        heparin (porcine) injection 3,990 Units  40 Units/kg Intravenous PRN Nato Workman MD        sodium chloride flush 0.9 % injection 10 mL  10 mL Intravenous 2 times per day Mónica Godoy PA-C        sodium chloride flush 0.9 % injection 10 mL  10 mL Intravenous PRN Mónica Godoy PA-C  promethazine (PHENERGAN) tablet 12.5 mg  12.5 mg Oral Q6H PRN Gertrude Media, PA-C        Or    ondansetron [facility]) injection 4 mg  4 mg Intravenous Q6H PRN Gertrude Media, PA-C        polyethylene glycol [facility]) packet 17 g  17 g Oral Daily PRN Gertrude Media, PA-C        acetaminophen (TYLENOL) tablet 650 mg  650 mg Oral Q6H PRN Gertrude Media, PA-C   650 mg at 02/24/21 2538    Or    acetaminophen (TYLENOL) suppository 650 mg  650 mg Rectal Q6H PRN Gertrude Media, PA-C           Allergies:    Allergies   Allergen Reactions    Reglan [Metoclopramide Hcl] Shortness Of Breath     \"cuts my breathing off\"    Allopurinol Rash    Cephalexin Rash     Keflex causes a rash         Subjective   REVIEW OF SYSTEMS:   CONSTITUTIONAL: no fever, no night sweats, no fatigue;  HEENT: no blurring of vision, no double vision, no hearing difficulty, no tinnitus, no ulceration, no dysplasia, no epistaxis;  LUNGS: no cough, no hemoptysis, no wheeze,  no shortness of breath;  CARDIOVASCULAR: no palpitation, no chest pain, no shortness of breath;  GI: no abdominal pain, no nausea, no vomiting, no diarrhea, no constipation;  NO: no dysuria, no hematuria, no frequency or urgency, no nephrolithiasis;  MUSCULOSKELETAL: Right lower extremity pain, swelling, no stiffness;  ENDOCRINE: no polyuria, no polydipsia, no cold or heat intolerance;  HEMATOLOGY: no easy bruising or bleeding, no history of clotting disorder;  DERMATOLOGY: no skin rash, no eczema, no pruritus;  PSYCHIATRY: no depression, no anxiety, no panic attacks, no suicidal ideation, no homicidal ideation;  NEUROLOGY: no syncope, no seizures, no numbness or tingling of hands, no numbness or tingling of feet, no paresis;    Objective   /68   Pulse 89   Temp 98.7 °F (37.1 °C) (Temporal)   Resp 20   Ht 6' (1.829 m)   Wt 220 lb (99.8 kg)   SpO2 94%   BMI 29.84 kg/m²     PHYSICAL EXAM:  CONSTITUTIONAL: Alert, appropriate, no acute distress  EYES: Non icteric, EOM intact, pupils equal round   ENT: Mucus membranes moist, no oral pharyngeal lesions, external inspection of ears and nose are normal  NECK: Supple, no masses. No palpable thyroid mass  CHEST/LUNGS: CTA bilaterally, normal respiratory effort   CARDIOVASCULAR: RRR, no murmurs. No lower extremity edema  ABDOMEN: soft non-tender, active bowel sounds, no HSM. No palpable masses  EXTREMITIES: Right ankle edema, warm, full ROM in all 4 extremities, no focal weakness. SKIN: warm, dry with no rashes or lesions  LYMPH: No cervical, clavicular, axillary, or inguinal lymphadenopathy  NEUROLOGIC: follows commands, non focal   PSYCH: mood and affect appropriate. Alert and oriented to time, place, person        LABORATORY RESULTS REVIEWED/ANALYZED BY ME:  Recent Labs     02/24/21  0049 02/23/21  1515 02/08/21  1220   WBC 10.3 9.9 8.9   HGB 14.1 14.6 14.7   HCT 41.5* 42.4 42.7   MCV 99.8* 99.8* 99.5*    350 315       Lab Results   Component Value Date     02/23/2021    K 3.5 02/23/2021     02/23/2021    CO2 28 02/23/2021    BUN 13 02/23/2021    CREATININE 1.4 (H) 02/23/2021    GLUCOSE 141 (H) 02/23/2021    CALCIUM 9.1 02/23/2021    PROT 7.9 02/23/2021    LABALBU 4.0 02/23/2021    BILITOT 0.7 02/23/2021    ALKPHOS 87 02/23/2021    AST 17 02/23/2021    ALT 20 02/23/2021    LABGLOM 53 (A) 02/23/2021    GFRAA >59 02/23/2021       Lab Results   Component Value Date    INR 1.68 (H) 02/23/2021    INR 1.08 02/08/2021    INR 0.98 02/19/2020    PROTIME 20.0 (H) 02/23/2021    PROTIME 13.9 02/08/2021    PROTIME 12.4 02/19/2020       RADIOLOGY STUDIES REPORT/REVIEWED AND INTERPRETED BY ME:  Vl Dup Lower Extremity Venous Right  Result Date: 2/8/2021  Impression   There is evidence of subacute deep vein thrombosis in the right lower  extremity involving the popliteal, peroneal, and posterior tibial veins. There is no evidence of superficial thrombophlebitis of the right lower  extremity.       Vl Extremity Venous Right  Result Date: 2/23/2021  Impression   There is evidence of subacute deep vein thrombosis in the right lower  extremity involving the popliteal, peroneal, posterior tibial, and soleal  veins. There is no evidence of superficial thrombophlebitis of the right lower  extremity. Cta Pulmonary W Contrast  Result Date: 2/23/202  FINDINGS:  Pulmonary arteries: There is adequate enhancement of the pulmonary arteries to evaluate for central and segmental pulmonary emboli. Filling defect to the right lower lobe arterial supply (image 93, series 5), consistent with pulmonary embolus. . RV to LV ratio is 0.7. Lesa Dues Aorta and great vessels: Poorly opacified, limiting evaluation. . The great vessels are normal in appearance. Visualized neck base: The imaged portion of the base of the neck appears unremarkable. Lungs: Biapical scarring. No focal consolidation, pleural effusion or pneumothorax. . The trachea and bronchial tree are patent. Heart: Mild cardiomegaly. . There is no pericardial effusion. Mediastinum and lymph nodes: No enlarged mediastinal, hilar, or axillary lymph nodes are present. Skeletal and soft tissues: The osseous structures of the thorax and surrounding soft tissues demonstrate no acute process. Upper abdomen: The imaged portion of the upper abdomen demonstrates no acute process. Nonobstructive right nephrolithiasis. Hepatosteatosis. 1.   Pulmonary embolus in the right lower lobe. No right heart strain. My interpretation: Segmental pulmonary emboli      ASSESSMENT:  #Venous thromboembolism, unprovoked Feb 2021  Risk factors (overweight)  2/8/2021-started on Xarelto.   2/23/2021-negative troponin, normal proBNP  2D echo pending  PESI score-    #Chronic kidney disease stage IIIA-creatinine 1.5/GFR 53    PLAN:  · Stop UFH infusion  · Switch to low molecular weight heparin 1 mg/kg every 12 hours x3 weeks  · Outpatient thrombophilia work-up to include WARREN-2 MPN, MPL and PNH flow and standard tests  · APS work-up (leukocytoclastic, beta-2 glycoprotein IgG/IgM, anticardiolipin IgG/IgM)  · Recommended age appropriated cancer screening  · Case management consultation  · Likely home tomorrow    I have seen, examined and reviewed this patient medication list, appropriate labs and imaging studies. I reviewed relevant medical records and others physicians notes. I discussed the plans of care with the patient. I answered all the questions to the patients satisfaction. I have also reviewed the chief complaint (CC) and part of the history (History of Present Illness (HPI), Past Family Social History [facility]), or Review of Systems (ROS) and made changes when appropriated.         (Please note that portions of this note were completed with a voice recognition program. Efforts were made to edit the dictations but occasionally words are mis-transcribed.)    Edsno Yanez MD    02/24/21  5:57 AM

## 2021-02-24 NOTE — CARE COORDINATION
[facility] has filed a Medicaid application for patient. Medicaid office has 30 days to process application. Marisa Mariscal is attempting to expedite process. Per [facility] with Pharmacy, without insurance, 3 week supply of Lovenox will be $478.98.   Electronically signed by Marta Henderson RN on 2/24/2021 at 1:54 PM

## 2021-02-24 NOTE — ED PROVIDER NOTES
Attending Supervisory Note/Shared Visit   I have personally performed a face to face diagnostic evaluation on this patient. I have reviewed the mid-levels findings and agree. Briefly, patient presents the ED for evaluation regarding increasing shortness of breath over the last 1 to 2 days. He was seen here in the ED and diagnosed with a right lower extremity DVT on 2/8/2021. States he was discharged home on Xarelto therapy and he has been taking this medication as directed. He is already underwent an outpatient follow-up with his PMD, Dr. Ayad Martin. He has not had any prior history of previous DVT or pulmonary embolism other than his most recent diagnosis. States he did not have any specific trauma or injury to the lower extremity to precipitate development of a DVT. No prior history of cancer. States he is not really having any chest pain he just feels like he has trouble taking a deep breath and feels a little more short of breath with any kind of exertion. On my exam: Patient is alert and speaking. He is in no acute respiratory distress. His vital signs are stable. I have reviewed patient's CT scan findings which demonstrate evidence of    Impression:    1.   Pulmonary embolus in the right lower lobe. No right heart strain. 2.  Result discussed with Dr. Agatha Bell 2/23/2021 at 5:19 PM   Signed by Dr Beatriz Morales on 2/23/2021 5:21 PM         Impression       James Worthy is evidence of subacute deep vein thrombosis in the right lower    extremity involving the popliteal, peroneal, posterior tibial, and soleal    veins.    There is no evidence of superficial thrombophlebitis of the right lower    extremity.         Given development of pulmonary embolism despite outpatient therapy with Xarelto we will plan for admission to the hospitalist service with plans for vascular surgery consultation. There is no evidence of right heart strain noted on CT scan.   He is not in any acute respiratory distress. His cardiac biomarker is unremarkable, serum BNP is normal.    Case was reviewed with Dr. Billee Siemens who is agreeable to see patient in consultation and is recommended patient be started on heparin infusion with plans for hematology consultation. Case discussed with Dr. Ferdinand Urias regarding inpatient admission to the hospitalist service. FINAL IMPRESSION      1. Acute pulmonary embolism, unspecified pulmonary embolism type, unspecified whether acute cor pulmonale present ([facility] [address]    2.  History of DVT (deep vein thrombosis)          Joycelyn Cueva MD  Attending Emergency Physician        Joycelyn Cueva MD  02/23/21 8701

## 2021-02-24 NOTE — ED NOTES
Paged Dr Elliott Ortiz for Dr Felipe Conn @ [address] [place] [address]  02/23/21 [address]

## 2021-02-25 ENCOUNTER — CLINICAL DOCUMENTATION (OUTPATIENT)
Dept: HEMATOLOGY | Age: 56
End: 2021-02-25

## 2021-02-25 LAB
HCT VFR BLD CALC: 40.7 % (ref 42–52)
HEMOGLOBIN: 14.1 G/DL (ref 14–18)
MCH RBC QN AUTO: 34.1 PG (ref 27–31)
MCHC RBC AUTO-ENTMCNC: 34.6 G/DL (ref 33–37)
MCV RBC AUTO: 98.5 FL (ref 80–94)
PDW BLD-RTO: 11.7 % (ref 11.5–14.5)
PLATELET # BLD: 292 K/UL (ref 130–400)
PMV BLD AUTO: 9.9 FL (ref 9.4–12.4)
RBC # BLD: 4.13 M/UL (ref 4.7–6.1)
REASON FOR REJECTION: NORMAL
REJECTED TEST: NORMAL
WBC # BLD: 7.2 K/UL (ref 4.8–10.8)

## 2021-02-25 PROCEDURE — 36415 COLL VENOUS BLD VENIPUNCTURE: CPT

## 2021-02-25 PROCEDURE — 85027 COMPLETE CBC AUTOMATED: CPT

## 2021-02-25 PROCEDURE — 6360000002 HC RX W HCPCS: Performed by: INTERNAL MEDICINE

## 2021-02-25 PROCEDURE — 2580000003 HC RX 258: Performed by: PHYSICIAN ASSISTANT

## 2021-02-25 PROCEDURE — 6370000000 HC RX 637 (ALT 250 FOR IP): Performed by: INTERNAL MEDICINE

## 2021-02-25 PROCEDURE — 99232 SBSQ HOSP IP/OBS MODERATE 35: CPT | Performed by: INTERNAL MEDICINE

## 2021-02-25 PROCEDURE — 1210000000 HC MED SURG R&B

## 2021-02-25 RX ADMIN — ENOXAPARIN SODIUM 100 MG: 100 INJECTION SUBCUTANEOUS at 08:20

## 2021-02-25 RX ADMIN — SODIUM CHLORIDE, PRESERVATIVE FREE 10 ML: 5 INJECTION INTRAVENOUS at 08:20

## 2021-02-25 RX ADMIN — ENOXAPARIN SODIUM 100 MG: 100 INJECTION SUBCUTANEOUS at 23:00

## 2021-02-25 RX ADMIN — WARFARIN SODIUM 7.5 MG: 5 TABLET ORAL at 17:14

## 2021-02-25 NOTE — PROGRESS NOTES
Clinical Pharmacy Note    Chivo Rodrigues is a 50 y.o. male for whom pharmacy has been asked to manage warfarin therapy. Reason for Admission: dvt/pe    Consulting Physician: April  Warfarin dose prior to admission: new start  Warfarin indication: dvt/pe  Target INR range: 2-3       Past Medical History:   Diagnosis Date    Bleeds easily ([address]     ever since colon surgery    Headache(784.0)     Hyperlipidemia     mild; pcp never called in meds/no meds. Kidney stone     hx of    S/P cardiac cath 07/14/2011    Thyroid disease     Ventral hernia                 Recent Labs     02/23/21  1515   INR 1.68*     Recent Labs     02/23/21  1515 02/24/21  0049 02/25/21  0451   HGB 14.6 14.1 14.1   HCT 42.4 41.5* 40.7*    333 292       Current warfarin drug-drug interactions: enoxaparin, thyroid hormone, apap        Date INR Warfarin Dose   02/25/21 1.68 7.5 mg                                     Daily PT/INR until stable within therapeutic range. Thank you for the consult.      Electronically signed by Vu Pickard, [facility] on 2/25/2021 at 10:48 AM

## 2021-02-25 NOTE — PROGRESS NOTES
[facility]    Patient:  Emily Jones  YOB: 1965  Date of Service: 2/25/2021  MRN: [id number]   Acct: [de-identified]   Primary Care Physician: Yojana Morales DO  Advance Directive: Full Code  Admit Date: 2/23/2021       Hospital Day: 2  Portions of this note have been copied forward, however, changed to reflect the most current clinical status of this patient. CHIEF COMPLAINT RLE pain    SUBJECTIVE:  Mr. Adarsh Crow complains of headache, states he has not had caffeine in several days. Denies visual changes, slurred speech or weakness. Cumulative Hospital Course: The patient is a 50 y.o. male who presented to [address] [place] [address] ED complaining of pain in the right lower extremity that started in the evening of 02/23/2021 and has been constant. He was diagnosed with DVT on 02/08/2021 and was started on Xarelto. He described pain 10/10 in his right ankle. Stated he has been immobile for about 3 weeks due to pain in right lower extremity. Denied recent trips. Does not smoke. Has not been diagnosed with COVID. No known family history of clotting/bleeding disorders. Repeat US again revealed subacute DVT in right lower extremity involving popliteal, peroneal, posterior tibial, and soleal veins. CTA revealed pulmonary embolus in the right lower lobe. No heart strain. Patient admitted to Hospitalist service on Heparin gtt, transitioned to Lovenox. Hematology and Vascular surgery consults placed. Recommendations to continue Lovenox 1mg/kg every 12 hours x 3 weeks with outpatient thrombophilia work up. Patient unable to afford 3 weeks of Lovenox. Will discharge home on Lovenox/Coumadin bridge. [facility] clinic will assist with patient follow up INR checks. Review of Systems:   14 point review of systems is negative except as specifically addressed above.     Objective:   VITALS:  /75   Pulse 70   Temp 97.7 °F (36.5 °C) (Temporal)   Resp 18   Ht 6' (1.829 m)   Wt 221 lb (100.2 kg)   SpO2 93%   BMI 29.97 kg/m²   24HR INTAKE/OUTPUT:    No intake or output data in the 24 hours ending 02/25/21 0941  General appearance: alert, appears stated age, cooperative and no distress, resting comfortably in bed   Head: Normocephalic, without obvious abnormality, atraumatic  Eyes: conjunctivae/corneas clear. PERRL, EOM's intact. Ears: normal external ears and nose, throat without exudate  Neck: no adenopathy, no carotid bruit, no JVD, supple, symmetrical, trachea midline   Lungs: clear throughout,no rales or wheezes   Heart: RRR, S1, S2 normal, no murmur  Abdomen:soft, non-tender; non-distended, normal bowel sounds no masses, no organomegaly  Extremities:right ankle edema,  No erythema, no tenderness to palpation  Skin: Skin color, texture, turgor normal. No rashes or lesions  Lymphatic: No palpable lymph node enlargment  Neurologic: Alert and oriented X 3, normal strength and tone.  No focal deficits  Psychiatric: Alert and oriented, thought content appropriate, normal insight, mood appropriate    Medications:      enoxaparin  1 mg/kg Subcutaneous BID    levothyroxine  25 mcg Oral Daily    sodium chloride flush  10 mL Intravenous 2 times per day     sodium chloride flush, promethazine **OR** ondansetron, polyethylene glycol, acetaminophen **OR** acetaminophen  DIET GENERAL; Dental Soft     Lab and other Data:     Recent Labs     02/23/21  1515 02/24/21  0049 02/25/21  0451   WBC 9.9 10.3 7.2   HGB 14.6 14.1 14.1    333 292     Recent Labs     02/23/21  1515 02/24/21  0710    138   K 3.5 3.7    101   CO2 28 26   BUN 13 14   CREATININE 1.4* 1.4*   GLUCOSE 141* 114*     Recent Labs     02/23/21  1515   AST 17   ALT 20   BILITOT 0.7   ALKPHOS 87     Troponin T:   Recent Labs     02/23/21  1515   TROPONINI <0.01     INR:   Recent Labs     02/23/21  1515   INR 1.68*     RAD:   Vl Extremity Venous Right  Impression   There is evidence of subacute deep vein thrombosis in the right lower  extremity involving the popliteal, peroneal, posterior tibial, and soleal  veins. There is no evidence of superficial thrombophlebitis of the right lower  extremity. Signature    Electronically signed by Marta Gill MD(Interpreting  physician) on 02/23/2021 04:37 PM     Cta Pulmonary W Contrast  1. Pulmonary embolus in the right lower lobe. No right heart strain. 2.  Result discussed with Dr. Lizbeth Harvey 2/23/2021 at 5:19 PM Signed by Dr Meri Herring on 2/23/2021 5:21 PM    Echo 2d W Doppler W Color W Contrast  Conclusions   Summary  Normal left ventricular chamber size and systolic function. Left ventricular ejection fraction is visually estimated at 60%. Signature    Electronically signed by Prashant Flynn DO(Interpreting  physician) on 02/24/2021 09:11 AM      Echo:   Summary   Normal left ventricular chamber size and systolic function. Left ventricular ejection fraction is visually estimated at 60%. Micro: COVID-19 not detected    Assessment/Plan   Principal Problem:    Leg DVT (deep venous thromboembolism), acute, unspecified laterality (HCC)  Active Problems:    Pulmonary embolism (HCC)    Stage 3 chronic kidney disease  Resolved Problems:    * No resolved hospital problems. *    Principal Problem:    Pulmonary embolism with known RLE DVT-Lovenox 1 mg/kg BID x 1 week at DC with Coumadin, OP thrombophilia work up.  Appreciate assistance from Hematology/Vascular surgery     Active Problems:       Stage 3 chronic kidney disease-Monitor, appears to be at baseline         Hx of Hypothyroidism-synthroid resumed     Further orders per clinical course/attending     DVT Prophylaxis: Lovenox    Mainor Bustamante PA-C

## 2021-02-25 NOTE — PROGRESS NOTES
MEDICAL ONCOLOGY PROGRESS NOTE    Pt Name: Smitha Marroquin  MRN: [id number]  YOB: 1965  Date of evaluation: 2/25/2021    Subjective:feeling better. Worried about co-pays. HISTORY OF PRESENT ILLNESS:      Diagnosis  · Unprovoked VTE   Treatment summary  · Lovenox-> warfarin (INR 2-3)    Susan Joshi was first seen by me on 2/24/2021. The patient was recently diagnosed with right lower extremity DVT on 2/8/2020. A right lower extremity venous ultrasound showed evidence of subacute deep vein thrombosis in the right lower extremity involving the popliteal, peroneal and posterior tibial veins. He was started on Xarelto on 2/8/2021. He presented to the ER department 2/23/2021 with persistent/worsening right lower extremity pain. The pain has been persistent since his diagnosis. Denies any recent surgery, trauma, trips, no recent history of COVID-19. A repeat right venous extremity ultrasound showed persistent subacute deep vein thrombosis in the right lower extremity. A CT chest angiogram showed filling defect to the right lower lobe arterial supply consistent with pulmonary emboli. · 2/8/2021-Vl Dup Lower Extremity Venous Right- evidence of subacute deep vein thrombosis in the right lower  extremity involving the popliteal, peroneal, and posterior tibial veins. There is no evidence of superficial thrombophlebitis of the right lower  Extremity. · 2/23/2021-Vl Extremity Venous Right-evidence of subacute deep vein thrombosis in the right lower  extremity involving the popliteal, peroneal, posterior tibial, and soleal  veins. There is no evidence of superficial thrombophlebitis of the right lower  extremity. · 2/23/2021-Cta Pulmonary W Contrast-Filling defect to the right lower lobe arterial supply (image 93, series 5), consistent with pulmonary embolus. . RV to LV ratio is 0.7. Heart: Mild cardiomegaly. · 2/24/2021-he was first seen by me. · 2/24/2021-2D echo showed normal LVEF 60% .  RV was intact, pupils equal round   ENT: Mucus membranes moist, no oral pharyngeal lesions, external inspection of ears and nose are normal  NECK: Supple, no masses. No palpable thyroid mass  CHEST/LUNGS: CTA bilaterally, normal respiratory effort   CARDIOVASCULAR: RRR, no murmurs. No lower extremity edema  ABDOMEN: soft non-tender, active bowel sounds, no HSM. No palpable masses  EXTREMITIES: Right ankle edema, warm, full ROM in all 4 extremities, no focal weakness. SKIN: warm, dry with no rashes or lesions  LYMPH: No cervical, clavicular, axillary, or inguinal lymphadenopathy  NEUROLOGIC: follows commands, non focal   PSYCH: mood and affect appropriate. Alert and oriented to time, place, person        LABORATORY RESULTS REVIEWED/ANALYZED BY ME:  Recent Labs     02/25/21  0451 02/24/21  0049 02/23/21  1515   WBC 7.2 10.3 9.9   HGB 14.1 14.1 14.6   HCT 40.7* 41.5* 42.4   MCV 98.5* 99.8* 99.8*    333 350       Lab Results   Component Value Date     02/24/2021    K 3.7 02/24/2021     02/24/2021    CO2 26 02/24/2021    BUN 14 02/24/2021    CREATININE 1.4 (H) 02/24/2021    GLUCOSE 114 (H) 02/24/2021    CALCIUM 9.2 02/24/2021    PROT 7.9 02/23/2021    LABALBU 4.0 02/23/2021    BILITOT 0.7 02/23/2021    ALKPHOS 87 02/23/2021    AST 17 02/23/2021    ALT 20 02/23/2021    LABGLOM 53 (A) 02/24/2021    GFRAA >59 02/24/2021       Lab Results   Component Value Date    INR 1.68 (H) 02/23/2021    INR 1.08 02/08/2021    INR 0.98 02/19/2020    PROTIME 20.0 (H) 02/23/2021    PROTIME 13.9 02/08/2021    PROTIME 12.4 02/19/2020       RADIOLOGY STUDIES REPORT/REVIEWED AND INTERPRETED BY ME:  Vl Dup Lower Extremity Venous Right  Result Date: 2/8/2021  Impression   There is evidence of subacute deep vein thrombosis in the right lower  extremity involving the popliteal, peroneal, and posterior tibial veins. There is no evidence of superficial thrombophlebitis of the right lower  extremity.       Vl Extremity Venous Right  Result Date: 2/23/2021  Impression   There is evidence of subacute deep vein thrombosis in the right lower  extremity involving the popliteal, peroneal, posterior tibial, and soleal  veins. There is no evidence of superficial thrombophlebitis of the right lower  extremity. Cta Pulmonary W Contrast  Result Date: 2/23/202  FINDINGS:  Pulmonary arteries: There is adequate enhancement of the pulmonary arteries to evaluate for central and segmental pulmonary emboli. Filling defect to the right lower lobe arterial supply (image 93, series 5), consistent with pulmonary embolus. . RV to LV ratio is 0.7. Peder Cirilo Aorta and great vessels: Poorly opacified, limiting evaluation. . The great vessels are normal in appearance. Visualized neck base: The imaged portion of the base of the neck appears unremarkable. Lungs: Biapical scarring. No focal consolidation, pleural effusion or pneumothorax. . The trachea and bronchial tree are patent. Heart: Mild cardiomegaly. . There is no pericardial effusion. Mediastinum and lymph nodes: No enlarged mediastinal, hilar, or axillary lymph nodes are present. Skeletal and soft tissues: The osseous structures of the thorax and surrounding soft tissues demonstrate no acute process. Upper abdomen: The imaged portion of the upper abdomen demonstrates no acute process. Nonobstructive right nephrolithiasis. Hepatosteatosis. 1.   Pulmonary embolus in the right lower lobe. No right heart strain. My interpretation: Segmental pulmonary emboli      ASSESSMENT:  #Venous thromboembolism (segmental PE , unprovoked Feb 2021)  Risk factors (overweight)  2/8/2021-started on Xarelto. 2/23/2021-negative troponin, normal proBNP  2D echo Normal LVEF    The patient has no insurance. He cannot afford DOACS. Discussed with . Options: Stay in the hospital until warfarin therapeutic range INR-2-3  Arrange Lovenox for a week and bridge as outpatient.   Patient will need to follow-up with either

## 2021-02-25 NOTE — CARE COORDINATION
CM outreached to [place], at Dr. Christopher Ballesteros office. Pt was last seen at Dr. Christopher Ballesteros office on 2/17/2020 cost $72.00 for visit. Antonio checked with [employer] at their office and the cost of INR draw would be $36.00. Per Antonio, the office only accepts [facility]. CM has advised patient of about costs for care and only [facility] Medicaid accepted at the office. Pt has requested CM look into option of him receiving care at [facility]. CM advised by Shannan Baptiste, there is no guarantee pt will be assigned [facility], but could change in 90 days. CM outreached to [facility] [phone]. [facility] accepts all types of Medicaid. Pt would be able to get an appointment for after Hospital. He could get INR 's drawn. Initially, a $30.00 visit cost is appreciated, however, is patient cannot afford this is waived, pt can also apply for sliding scale, and /or a payment plan. CM advised [facility], discharge is not today so we will call back for an appointment.    Electronically signed by Celeste Anne RN on 2/25/2021 at 11:12 AM

## 2021-02-25 NOTE — CARE COORDINATION
KEVIN called back to Dr. Nikkie Adler office to advise patient has chosen f/u at [facility], as finances are a concern for him. KEVIN called back to Asif Taylor at Dr. Nikkie Adler office request call back.    Electronically signed by Bridgette Pratt RN on 2/25/2021 at 11:16 AM

## 2021-02-25 NOTE — CARE COORDINATION
Pt has Medicaid pending. SW consult -   Patient will need to be discharged on low molecular weight heparin, Lovenox 1 mg/kg every 12 hours x3 weeks. Per Pharmacy - Enoxaparin 100mg BID for 3 weeks $478.98 price for [id number] to fill unless Medicaid is expedited and ID number can be provided to pharmacy to process claim. This is unaffordable for pt. Could switch pt to Lovenox 1mg/kg every 12 hours x1week. Per pharmacy, price- $069.17    Continuing to follow. Electronically signed by Bridgette Corona on 2/25/2021 at 10:20 AM      Approved for PMF to cover cost of $167.98 for Lovenox. Continuing to follow for outpt INR checks/PCP appts.    Electronically signed by Bridgette Corona on 2/25/2021 at 11:22 AM

## 2021-02-25 NOTE — PLAN OF CARE
Problem: Mobility - Impaired:  Goal: Mobility will improve  Description: Mobility will improve  Outcome: Ongoing

## 2021-02-25 NOTE — CARE COORDINATION
Date / Time of Evaluation: 2/25/2021 10:26 AM  Assessment Completed by: Linnea Benítez    Patient Admission Status: Inpatient [101]      Current PCP:  Andriy Good, DO  PCP verified? Yes, pt has seen Dr. Thomas Merritt once last year and once prior to admission. He is paying out of pocket to see Dr. Thomas Merritt. At discharge, he will need to seen PCP for post hospital visit, have his INR drawn b/w 3-5 and have weekly INR's until stable. Initial Assessment Completed at bedside with:  Patient     Emergency Contacts:  Extended Emergency Contact Information  Primary Emergency Contact: Radha Cummings  Home Phone: [phone]  Relation: Other    Advance Directives: Code Status:  Full Code    Financial:  Payor: PENDING Clay German / Plan: PENDING MEDICAID / Product Type: *No Product type* /     Pre-Cert required for SNF:  Yes    Have Long Term Care Insurance:   N/A    Pharmacy:   [facility]/pharmacy [address] [place] [address] [address] [place] [address]  Phone: [phone] Fax: [phone]      Potential assistance purchasing medications?   Yes, patient does not have insurance    ADLS:  Support System:  Family Members, Friends/Neighbors, Girlfriend    Current Home Environment:  house  Steps:  3 steps, managable     Plans to RETURN to current housing: Yes   Barriers to RETURNING to current housing:  None cited    Currently ACTIVE with Home Health CARE:  No  [address]:  N/A    DME Provider:  Denies cane/walker    Has a pulse oximetry unit at home: No    Had [address] [facility] prior to admission:  No  [address]:  N/A  Informed of need to bring portable home O2 tank to hospital on day of DISCHARGE:  N/A  Name of person committed to bringing portable tank at discharge:  N/A    Active with HD/PD prior to admission: NO  Nephrologist:  N/A  HD Center:  N/A    Transition Plan:  home alone pta    Transportation PLAN for Discharge:  Girlfriend    Factors facilitating achievement of predicted outcomes: medically stable     Barriers to discharge:  DVT, PE      Additional CM Notes: CN spoke to patient at the bedside. He has seen PCP x 2. Pt states, DVT dx on 2/8 at urgent care, came to [address] [facility] ER, he was given coupon for Xarelto at no cost for one month. Pt states, he went back to see PCP but paid out of pocket. He does not have any insurance now, but is in the process for applying for Medicaid. Pt has limited funds to pay privately for PCP/ LAB draws. Manjula Mace and/or his family were provided with choice of provider.         Heide Alcocer RN  [facility]  Care Management Department  Ph:  [phone]   Fax: [phone]  Electronically signed by Heide Alcocer RN on 2/25/2021 at 11:03 AM

## 2021-02-26 VITALS
OXYGEN SATURATION: 94 % | DIASTOLIC BLOOD PRESSURE: 82 MMHG | TEMPERATURE: 97.4 F | BODY MASS INDEX: 29.93 KG/M2 | SYSTOLIC BLOOD PRESSURE: 115 MMHG | HEIGHT: 72 IN | WEIGHT: 221 LBS | HEART RATE: 77 BPM | RESPIRATION RATE: 18 BRPM

## 2021-02-26 LAB
ANTICARDIOLIPIN IGG ANTIBODY: 3 GPL (ref 0–14)
CARDIOLIPIN AB IGM: 13 MPL (ref 0–12)
INR BLD: 1.03 (ref 0.88–1.18)
PROTHROMBIN TIME: 13.4 SEC (ref 12–14.6)

## 2021-02-26 PROCEDURE — 99232 SBSQ HOSP IP/OBS MODERATE 35: CPT | Performed by: INTERNAL MEDICINE

## 2021-02-26 PROCEDURE — 2580000003 HC RX 258: Performed by: PHYSICIAN ASSISTANT

## 2021-02-26 PROCEDURE — 36415 COLL VENOUS BLD VENIPUNCTURE: CPT

## 2021-02-26 PROCEDURE — 6370000000 HC RX 637 (ALT 250 FOR IP): Performed by: INTERNAL MEDICINE

## 2021-02-26 PROCEDURE — 6360000002 HC RX W HCPCS: Performed by: INTERNAL MEDICINE

## 2021-02-26 PROCEDURE — 85610 PROTHROMBIN TIME: CPT

## 2021-02-26 RX ORDER — LEVOTHYROXINE SODIUM 0.03 MG/1
25 TABLET ORAL DAILY
Qty: 30 TABLET | Refills: 0 | Status: SHIPPED | OUTPATIENT
Start: 2021-02-27 | End: 2021-03-19

## 2021-02-26 RX ORDER — WARFARIN SODIUM 5 MG/1
5 TABLET ORAL DAILY
Qty: 30 TABLET | Refills: 0 | Status: SHIPPED | OUTPATIENT
Start: 2021-02-26

## 2021-02-26 RX ADMIN — WARFARIN SODIUM 7.5 MG: 2.5 TABLET ORAL at 15:45

## 2021-02-26 RX ADMIN — SODIUM CHLORIDE, PRESERVATIVE FREE 10 ML: 5 INJECTION INTRAVENOUS at 09:05

## 2021-02-26 RX ADMIN — ENOXAPARIN SODIUM 100 MG: 100 INJECTION SUBCUTANEOUS at 09:05

## 2021-02-26 NOTE — CONSULTS
Comprehensive Nutrition Assessment    Type and Reason for Visit:  Initial, Consult    Nutrition Assessment:  Consult recieved for Coumadin diet education. Provided pt with verbal and written diet education regarding vitamin K. Contact information given. Pt with appropriate questions and v/u of importance of consistent vitamin K intake. Malnutrition Assessment:  Malnutrition Status:  No malnutrition    Context:  Acute Illness       Nutrition Related Findings:  RLE trace edema      Wounds:  None       Current Nutrition Therapies:    DIET GENERAL; Dental Soft    Anthropometric Measures:  · Height: 6' (182.9 cm)  · Current Body Weight: 221 lb (100.2 kg)   · Admission Body Weight: 220 lb (99.8 kg)    · Ideal Body Weight: 178 lbs  · BMI: 30   · BMI Categories: Obese Class 1 (BMI 30.0-34. 9)       Nutrition Diagnosis:   · Altered nutrition-related lab values related to food and nutrition related knowledge deficit as evidenced by lab values    Nutrition Interventions:   Food and/or Nutrient Delivery:  Continue Current Diet  Nutrition Education/Counseling:  Education completed   Coordination of Nutrition Care:  Continue to monitor while inpatient    Goals:  Pt v/u of diet education and continue adequate po intake.        Nutrition Monitoring and Evaluation:   Behavioral-Environmental Outcomes:  Knowledge or Skill   Food/Nutrient Intake Outcomes:  Food and Nutrient Intake  Physical Signs/Symptoms Outcomes:  Biochemical Data, Nutrition Focused Physical Findings, Weight, Fluid Status or Edema     Discharge Planning:    Continue current diet     Electronically signed by Nery Olivas, MS, RD, LD on 2/26/21 at 10:41 AM CST    Contact: [phone]

## 2021-02-26 NOTE — PROGRESS NOTES
CLINICAL PHARMACY NOTE: MEDS TO [address] [place] [address] Select Patient?: No  Total # of Prescriptions Filled: 2   The following medications were delivered to the patient:  · Warfarin 5 mg  · Lovenox 100mg/ml  Total # of Interventions Completed: 0  Time Spent (min): 30    Additional Documentation:    Handed scripts to [address] [place] [address] at pharmacy window.

## 2021-02-26 NOTE — DISCHARGE INSTR - DIET
 Good nutrition is important when healing from an illness, injury, or surgery. Follow any nutrition recommendations given to you during your hospital stay.  If you were given an oral nutrition supplement while in the hospital, continue to take this supplement at home. You can take it with meals, in-between meals, and/or before bedtime. These supplements can be purchased at most local grocery stores, pharmacies, and chain super-stores.  If you have any questions about your diet or nutrition, call the hospital and ask for the dietitian. Consistent Vitamin K Diet: Care Instructions  Your Care Instructions     Your body needs vitamin K to clot blood and keep your bones strong. It's found in leafy green vegetables such as kale and spinach. If you take the blood thinner warfarin (Coumadin), you need to be careful about how much vitamin K you get. Vitamin K can keep your warfarin from working as it should. Most people who take warfarin can eat normally. The important thing is to get about the same amount of vitamin K each day. Don't suddenly start eating foods with a lot more or a lot less vitamin K. You can choose how much vitamin K you eat. For example, if you already eat a lot of leafy green vegetables, that's fine. Just keep it about the same amount each day. Follow-up care is a key part of your treatment and safety. Be sure to make and go to all appointments, and call your doctor if you are having problems. It's also a good idea to know your test results and keep a list of the medicines you take. How can you care for yourself at home? You don't need to stop eating foods high in vitamin K. But you do need to know what foods contain vitamin K. Then you can try to eat about the same amount of vitamin K each day. If you have questions about foods with vitamin K, ask if your doctor can refer you to a registered dietitian, an expert in healthy eating.   · You might limit foods that are high in vitamin K to about 1 serving a day. These foods have more than 100 micrograms (mcg) of vitamin K in each serving. They include:  ? Cooked leafy green vegetables. Examples are kale, spinach, turnip greens, maine greens, Swiss chard, mustard greens, broccoli, and brussels sprouts. One serving is ½ cup.  ? Raw leafy green vegetables such as kale, spinach, and endive. · You might limit foods that are moderate in vitamin K to about 3 servings a day. These foods have about 25 to 100 mcg of vitamin K in each serving. These include:  ? Cooked cabbage, okra, and asparagus. One serving is ½ cup.  ? Raw leafy green vegetables. Examples are green leaf lettuce, and domitila lettuce. One serving is 1 cup. · Vitamin K also is found in many multivitamins. You don't need to stop taking your multivitamin if it has vitamin K. But you do need to take it every day. · Check with your doctor before you start or stop taking any supplements or herbal products. Some of these may contain vitamin K. Where can you learn more? Go to [email or web address]://[email or web address].[email or web address].com. org and sign in to your [employer] account. Enter A519 in the Unype box to learn more about \"Consistent Vitamin K Diet: Care Instructions. \"     If you do not have an account, please click on the \"Sign Up Now\" link. Current as of: December 16, 2019               Content Version: 12.6  © 9936-9837 [employer]. Care instructions adapted under license by [facility] ([facility]). If you have questions about a medical condition or this instruction, always ask your healthcare professional. [place] [address] any warranty or liability for your use of this information.

## 2021-02-26 NOTE — PROGRESS NOTES
MEDICAL ONCOLOGY PROGRESS NOTE    Pt Name: Nanci Jorge  MRN: [id number]  YOB: 1965  Date of evaluation: 2/26/2021    Subjective:Feeling better. HISTORY OF PRESENT ILLNESS:      Diagnosis  · Unprovoked VTE     Treatment summary  · Lovenox-> warfarin (INR 2-3)    Stephanie Joseph was first seen by Dr. Dominique Torres on 2/24/2021. The patient was recently diagnosed with right lower extremity DVT on 2/8/2020. A right lower extremity venous ultrasound showed evidence of subacute deep vein thrombosis in the right lower extremity involving the popliteal, peroneal and posterior tibial veins. He was started on Xarelto on 2/8/2021. He presented to the ER department 2/23/2021 with persistent/worsening right lower extremity pain. The pain has been persistent since his diagnosis. Denies any recent surgery, trauma, trips, no recent history of COVID-19. A repeat right venous extremity ultrasound showed persistent subacute deep vein thrombosis in the right lower extremity. A CT chest angiogram showed filling defect to the right lower lobe arterial supply consistent with pulmonary emboli. · 2/8/2021-Vl Dup Lower Extremity Venous Right- evidence of subacute deep vein thrombosis in the right lower  extremity involving the popliteal, peroneal, and posterior tibial veins. There is no evidence of superficial thrombophlebitis of the right lower  Extremity. · 2/23/2021-Vl Extremity Venous Right-evidence of subacute deep vein thrombosis in the right lower  extremity involving the popliteal, peroneal, posterior tibial, and soleal  veins. There is no evidence of superficial thrombophlebitis of the right lower  extremity. · 2/23/2021-Cta Pulmonary W Contrast-Filling defect to the right lower lobe arterial supply (image 93, series 5), consistent with pulmonary embolus. . RV to LV ratio is 0.7. Heart: Mild cardiomegaly. · 2/24/2021-he was first seen by me. · 2/24/2021-2D echo showed normal LVEF 60% .  RV was not well seen but appeared mildly dilated with normal systolic function. RVSP 16.    Past Medical History:  -  Past Medical History:   Diagnosis Date    Bleeds easily ([address]     ever since colon surgery    Headache(784.0)     Hyperlipidemia     mild; pcp never called in meds/no meds.  Kidney stone     hx of    S/P cardiac cath 07/14/2011    Thyroid disease     Ventral hernia        Past Surgical History:    Past Surgical History:   Procedure Laterality Date    ANKLE SURGERY      chip, fracture, and then tendon repair; surgery x 2    CARDIAC CATHETERIZATION  2011    dr. yarbrough/normal/throught the wrist    CHOLECYSTECTOMY      COLON SURGERY      polyps that ruptured, colostomy takedown    GALLBLADDER SURGERY      HERNIA REPAIR N/A 2/21/2020    ROBOTIC ASSISTED LAPAROSCOPIC VENTRAL HERNIA REPAIR TIMES TWO performed by Vanna Sam DO at [address] [place] [address]    KNEE ARTHROSCOPY  07       Social History:    Marital status, children: No, no  Smoking status: Never smoker. Use of smokeless tobacco  ETOH status: No  Resides: Currently lives at home.   [facility] [address]    Family History:   Family History   Problem Relation Age of Onset    Cancer Mother         lung     Diabetes Mother     Coronary Art Dis Father     Cancer Father         lung    Hypertension Father     Heart Attack Father 43        with CHF       Current Hospital Medications:    Current Facility-Administered Medications   Medication Dose Route Frequency Provider Last Rate Last Admin    warfarin (COUMADIN) daily dosing (placeholder)   Other RX Placeholder Charito Davenport MD        enoxaparin (LOVENOX) injection 100 mg  1 mg/kg Subcutaneous BID Charito Davenport MD   100 mg at 02/25/21 2300    levothyroxine (SYNTHROID) tablet 25 mcg  25 mcg Oral Daily Zully Gabriel PA-C        sodium chloride flush 0.9 % injection 10 mL  10 mL Intravenous 2 times per day Zully Gabriel PA-C   10 mL at 02/25/21 0820    sodium chloride flush 0.9 % injection 10 mL  10 mL Intravenous PRN Franky Keys PA-C        promethazine (PHENERGAN) tablet 12.5 mg  12.5 mg Oral Q6H PRN Franky Keys PA-C        Or    ondansetron [facility]) injection 4 mg  4 mg Intravenous Q6H PRN Franky Keys PA-C        polyethylene glycol [facility]) packet 17 g  17 g Oral Daily PRN Franky Keys PA-C        acetaminophen (TYLENOL) tablet 650 mg  650 mg Oral Q6H PRN Franky Keys PA-C   650 mg at 02/24/21 1932    Or    acetaminophen (TYLENOL) suppository 650 mg  650 mg Rectal Q6H PRN Franky Keys PA-C           Allergies:    Allergies   Allergen Reactions    Reglan [Metoclopramide Hcl] Shortness Of Breath     \"cuts my breathing off\"    Allopurinol Rash    Cephalexin Rash     Keflex causes a rash         Subjective   REVIEW OF SYSTEMS:   CONSTITUTIONAL: no fever, no night sweats,  fatigue;  HEENT: no blurring of vision, no double vision, no hearing difficulty, no tinnitus, no ulceration, no dysplasia, no epistaxis;  LUNGS: no cough, no hemoptysis, no wheeze,   shortness of breath improved;  CARDIOVASCULAR: no palpitation, no chest pain,  shortness of breath improved;  GI: no abdominal pain, no nausea, no vomiting, no diarrhea, no constipation;  NO: no dysuria, no hematuria, no frequency or urgency, no nephrolithiasis;  MUSCULOSKELETAL: Right lower extremity pain, swelling improving, no stiffness;  ENDOCRINE: no polyuria, no polydipsia, no cold or heat intolerance;  HEMATOLOGY: no easy bruising or bleeding, no history of clotting disorder;  DERMATOLOGY: no skin rash, no eczema, no pruritus;  PSYCHIATRY: no depression, no anxiety, no panic attacks, no suicidal ideation, no homicidal ideation;  NEUROLOGY: no syncope, no seizures, no numbness or tingling of hands, no numbness or tingling of feet, no paresis;    Objective   /74   Pulse 69   Temp 97.7 °F (36.5 °C) (Temporal)   Resp 18   Ht 6' (1.829 m)   Wt 221 lb (100.2 kg)   SpO2 94% BMI 29.97 kg/m²     PHYSICAL EXAM:  CONSTITUTIONAL: Alert, appropriate, no acute distress  EYES: Non icteric, EOM intact, pupils equal round   ENT: Mucus membranes moist, no oral pharyngeal lesions, external inspection of ears and nose are normal  NECK: Supple, no masses. No palpable thyroid mass  CHEST/LUNGS: CTA bilaterally, normal respiratory effort   CARDIOVASCULAR: RRR, no murmurs. No lower extremity edema  ABDOMEN: soft non-tender, active bowel sounds, no HSM. No palpable masses  EXTREMITIES: Right ankle edema improving, warm, full ROM in all 4 extremities, no focal weakness. SKIN: warm, dry with no rashes or lesions  LYMPH: No cervical, clavicular, axillary, or inguinal lymphadenopathy  NEUROLOGIC: follows commands, non focal   PSYCH: mood and affect appropriate.  Alert and oriented to time, place, person        [facility] Dr. Rossi Dupree:  Recent Labs     02/25/21  0451 02/24/21  0049 02/23/21  1515   WBC 7.2 10.3 9.9   HGB 14.1 14.1 14.6   HCT 40.7* 41.5* 42.4   MCV 98.5* 99.8* 99.8*    333 350       Lab Results   Component Value Date     02/24/2021    K 3.7 02/24/2021     02/24/2021    CO2 26 02/24/2021    BUN 14 02/24/2021    CREATININE 1.4 (H) 02/24/2021    GLUCOSE 114 (H) 02/24/2021    CALCIUM 9.2 02/24/2021    PROT 7.9 02/23/2021    LABALBU 4.0 02/23/2021    BILITOT 0.7 02/23/2021    ALKPHOS 87 02/23/2021    AST 17 02/23/2021    ALT 20 02/23/2021    LABGLOM 53 (A) 02/24/2021    GFRAA >59 02/24/2021       Lab Results   Component Value Date    INR 1.03 02/26/2021    INR 1.68 (H) 02/23/2021    INR 1.08 02/08/2021    PROTIME 13.4 02/26/2021    PROTIME 20.0 (H) 02/23/2021    PROTIME 13.9 02/08/2021       RADIOLOGY STUDIES REPORT/REVIEWED AND INTERPRETED BY Dr. Rannie Prophet:  Vl Dup Lower Extremity Venous Right  Result Date: 2/8/2021  Impression   There is evidence of subacute deep vein thrombosis in the right lower  extremity involving the popliteal, peroneal, and posterior tibial veins. There is no evidence of superficial thrombophlebitis of the right lower  extremity. Vl Extremity Venous Right  Result Date: 2/23/2021  Impression   There is evidence of subacute deep vein thrombosis in the right lower  extremity involving the popliteal, peroneal, posterior tibial, and soleal  veins. There is no evidence of superficial thrombophlebitis of the right lower  extremity. Cta Pulmonary W Contrast  Result Date: 2/23/202  FINDINGS:  Pulmonary arteries: There is adequate enhancement of the pulmonary arteries to evaluate for central and segmental pulmonary emboli. Filling defect to the right lower lobe arterial supply (image 93, series 5), consistent with pulmonary embolus. . RV to LV ratio is 0.7. Liza Savant Aorta and great vessels: Poorly opacified, limiting evaluation. . The great vessels are normal in appearance. Visualized neck base: The imaged portion of the base of the neck appears unremarkable. Lungs: Biapical scarring. No focal consolidation, pleural effusion or pneumothorax. . The trachea and bronchial tree are patent. Heart: Mild cardiomegaly. . There is no pericardial effusion. Mediastinum and lymph nodes: No enlarged mediastinal, hilar, or axillary lymph nodes are present. Skeletal and soft tissues: The osseous structures of the thorax and surrounding soft tissues demonstrate no acute process. Upper abdomen: The imaged portion of the upper abdomen demonstrates no acute process. Nonobstructive right nephrolithiasis. Hepatosteatosis. 1.   Pulmonary embolus in the right lower lobe. No right heart strain. My interpretation: Segmental pulmonary emboli      ASSESSMENT:  #Venous thromboembolism (segmental PE , unprovoked Feb 2021)  Risk factors (overweight)  2/8/2021-started on Xarelto. 2/23/2021-negative troponin, normal proBNP  2D echo Normal LVEF    The patient has no insurance. He cannot afford DOACS. Discussed with .    Options: Stay in the hospital change  Assessment/plan:  Venous thromboembolism-discharged on warfarin/Lovenox until INR 2-3. Arrange outpatient follow-up for monitoring of INR. Arrange short outpatient visit with PCP next week. We will see the patient in clinic in about 3 weeks to complete thrombophilia work-up. Recommended age-appropriate cancer screening. Patient is due for a colonoscopy.     João Kunz MD

## 2021-02-26 NOTE — DISCHARGE SUMMARY
Jason Sandoval  :  1965  MRN:  [id number]    Admit date:  2021  Discharge date:   2021  Discharging Physician:  Conchita Sharif DO    Advance Directive: Full Code    Consults: Dr. hCen-Hematology; Dr. Moran Sjogren surgery     Primary Care Physician:  Amauri Grayson DO    Discharge Diagnoses:    Principal Problem:    Leg DVT (deep venous thromboembolism), acute, unspecified laterality ([address]  Active Problems:    Pulmonary embolism ([facility]    Stage 3 chronic kidney disease  Resolved Problems:    * No resolved hospital problems. *    Portions of this note have been copied forward, however, changed to reflect the most current clinical status of this patient. Hospital Course: The patient is a 52 y. o. male who presented to [facility] ED complaining of pain in the right lower extremity that started in the evening of 2021 and was constant. He was diagnosed with DVT on 2021 and was started on Xarelto. He described pain 10/10 in his right ankle. Stated he was immobile for about 3 weeks due to pain in right lower extremity. Denied recent trips. Does not smoke. Has not been diagnosed with COVID. No known family history of clotting/bleeding disorders. Repeat US again revealed subacute DVT in right lower extremity involving popliteal, peroneal, posterior tibial, and soleal veins. CTA revealed pulmonary embolus in the right lower lobe. No heart strain. Patient admitted to Hospitalist service on Heparin gtt, transitioned to Lovenox. Hematology and Vascular surgery consults placed. Recommendations to continue Lovenox 1mg/kg every 12 hours x 3 weeks with outpatient thrombophilia work up. Patient unable to afford 3 weeks of Lovenox. Will  instead discharge home on Lovenox/Coumadin bridge. [facility] clinic will assist with patient follow up INR checks. At this time Mr. Kassadnra Liang is stable for discharge home and will continue INR checks at [facility].  He has been counseled regarding Coumadin/Diet consistency. Significant Diagnostic Studies:   RAD:   Vl Extremity Venous Right  Impression   There is evidence of subacute deep vein thrombosis in the right lower  extremity involving the popliteal, peroneal, posterior tibial, and soleal  veins. There is no evidence of superficial thrombophlebitis of the right lower  extremity. Signature    Electronically signed by Ange Powers MD(Interpreting  physician) on 02/23/2021 04:37 PM      Cta Pulmonary W Contrast  1. Pulmonary embolus in the right lower lobe. No right heart strain. 2.  Result discussed with Dr. Alison Landrum 2/23/2021 at 5:19 PM Signed by Dr Nitin Murrieta on 2/23/2021 5:21 PM     Echo 2d W Doppler W Color W Contrast  Conclusions   Summary  Normal left ventricular chamber size and systolic function. Left ventricular ejection fraction is visually estimated at 60%. Signature    Electronically signed by Lakshmi Gallegos DO(Interpreting  physician) on 02/24/2021 09:11 AM       Echo:   Summary   Normal left ventricular chamber size and systolic function.   Left ventricular ejection fraction is visually estimated at 60%.     Micro: COVID-19 not detected  Pertinent Labs:  CBC:   Recent Labs     02/23/21  1515 02/24/21  0049 02/25/21  0451   WBC 9.9 10.3 7.2   HGB 14.6 14.1 14.1    333 292     BMP:    Recent Labs     02/23/21  1515 02/24/21  0710    138   K 3.5 3.7    101   CO2 28 26   BUN 13 14   CREATININE 1.4* 1.4*   GLUCOSE 141* 114*     INR:   Recent Labs     02/23/21  1515 02/26/21  0231   INR 1.68* 1.03     Physical Exam:  Vital Signs: /82   Pulse 77   Temp 97.4 °F (36.3 °C)   Resp 18   Ht 6' (1.829 m)   Wt 221 lb (100.2 kg)   SpO2 94%   BMI 29.97 kg/m²   General appearance: Alert and Cooperative   HEENT: Normocephalic. Chest: clear to auscultation bilaterally without wheezes or rhonchi. Cardiac: Normal heart tones with regular rate and rhythm, S1, S2 normal. No murmurs, gallops, or rubs auscultated. Abdomen:soft, non-tender; non-distended normal bowel sounds no masses, no organomegaly. Extremities: No clubbing or cyanosis. No peripheral edema. Peripheral pulses palpable. Neurologic: Grossly intact. Discharge Medications: Bashir Alexander   Home Medication Instructions [facility]:[phone]    Printed on:02/26/21 1334   Medication Information                      enoxaparin (LOVENOX) 100 MG/ML injection  Inject 1 mL into the skin 2 times daily for 5 days             levothyroxine (SYNTHROID) 25 MCG tablet  Take 1 tablet by mouth Daily Indications: Underactive Thyroid             warfarin (COUMADIN) 5 MG tablet  Take 1 tablet by mouth daily               Discharge Instructions: Follow up with [facility] as scheduled. Follow up with Hematology as scheduled. Take medications as directed. Resume activity as tolerated. Diet: DIET GENERAL; Dental Soft     Disposition: Patient is medically stable and will be discharged home. Time spent on discharge 40 minutes spent in assessing patient, reviewing medications, discussion with nursing, confirming safe discharge plan and preparation of discharge summary.     Signed:  Chago Kruger PA-C

## 2021-02-26 NOTE — CARE COORDINATION
Pt to dc on Lovenox and Coumadin. Prive for medications- $142.96   Medication voucher provided by [facility].    Electronically signed by Olga Orellana on 2/26/2021 at 2:37 PM

## 2021-02-27 ENCOUNTER — CARE COORDINATION (OUTPATIENT)
Dept: CASE MANAGEMENT | Age: 56
End: 2021-02-27

## 2021-02-27 LAB
BETA-2 GLYCOPROTEIN 1 IGG ANTIBODY: 0 SGU (ref 0–20)
BETA-2 GLYCOPROTEIN 1 IGM ANTIBODY: 2 SMU (ref 0–20)

## 2021-02-27 NOTE — CARE COORDINATION
[facility] Transitions Initial Follow Up Call    Call within 2 business days of discharge: Yes    Patient: Cain Waite Patient : 1965   MRN: [id number]  Reason for Admission: DVT leg   Discharge Date: 21 RARS: Readmission Risk Score: 9      Last Discharge [address] [address]       Complaint Diagnosis Description Type Department Provider    21 Leg Pain Acute pulmonary embolism, unspecified pulmonary embolism type, unspecified whether acute cor pulmonale present ([address] . .. ED to Hosp-Admission (Discharged) (ADMITTED) [facility], DO; Joycelyn Cueva MD; ... Attempted to reach patient via phone for initial post hospital transition call. Received message no voicemail box setup at this time. CTN unable to leave message.          Follow Up  Future Appointments   Date Time Provider Ayesha Wild   3/19/2021 10:15 AM [address] [place] [address], APRN N PAD [facility]   3/19/2021 10:30 AM SCHEDULE, L MED ONC MA L MED ONC [place] [facility], RN
unknown

## 2021-03-01 ENCOUNTER — CARE COORDINATION (OUTPATIENT)
Dept: CASE MANAGEMENT | Age: 56
End: 2021-03-01

## 2021-03-01 LAB
DRVVT CONFIRMATION TEST: ABNORMAL RATIO
DRVVT SCREEN: 40 SEC (ref 33–44)
DRVVT,DIL: ABNORMAL SEC (ref 33–44)
HEXAGONAL PHOSPHOLIPID NEUTRALIZAT TEST: NEGATIVE
LUPUS ANTICOAG INTERP: ABNORMAL
PLT NEUTA: NEGATIVE
PT D: 15.5 SEC (ref 12–15.5)
PTT D: 89 SEC (ref 32–48)
PTT-D CORR REFLEX: 49 SEC (ref 32–48)
PTT-HEPARIN NEUTRALIZED: 53 SEC (ref 32–48)
REPTILASE TIME: 16 SEC
THROMBIN TIME: 30.7 SEC (ref 14.7–19.5)

## 2021-03-01 NOTE — CARE COORDINATION
[facility] Transitions Initial Follow Up Call    Call within 2 business days of discharge: Yes    Patient: Pa Salas Patient : 1965   MRN: [id number]  Reason for Admission:   Discharge Date: 21 RARS: Readmission Risk Score: 9      Last Discharge [facility]       Complaint Diagnosis Description Type Department Provider    21 Leg Pain Acute pulmonary embolism, unspecified pulmonary embolism type, unspecified whether acute cor pulmonale present ([facility] [address] . .. ED to Hosp-Admission (Discharged) (ADMITTED) [facility] Thalia Kohli DO; Chantal Quintanilla MD; ... Spoke with: Pa Salas      Challenges to be reviewed by the provider   Additional needs identified to be addressed with provider No  none    Discussed COVID-19 related testing which was available at this time. Test results were negative. Patient informed of results, if available? Yes         Method of communication with provider : none    Advance Care Planning:   Does patient have an Advance Directive:  none on file, discussed with him today     Was this a readmission? No  Patient stated reason for admission: leg pain, DVT, PE  Patients top risk factors for readmission: functional physical ability, medical condition and medication management    Care Transition Nurse (CTN) contacted the patient by telephone to perform post hospital discharge assessment. Verified name and  with patient as identifiers. Provided introduction to self, and explanation of the CTN role. CTN reviewed discharge instructions, medical action plan and red flags with patient who verbalized understanding. Patient given an opportunity to ask questions and does not have any further questions or concerns at this time. Were discharge instructions available to patient? Yes. Reviewed appropriate site of care based on symptoms and resources available to patient including: PCP, Specialist and Condition related references.  The patient agrees to contact the PCP office for questions related to their healthcare. Medication reconciliation was performed with patient, who verbalizes understanding of administration of home medications. Advised obtaining a 90-day supply of all daily and as-needed medications. Covid Risk Education    Patient has following risk factors of: DVT, PE.   Education provided regarding infection prevention, and signs and symptoms of COVID-19 and when to seek medical attention with patient who verbalized understanding. Discussed exposure protocols and quarantine From CDC: Are you at higher risk for severe illness?   and given an opportunity for questions and concerns. The patient agrees to contact the COVID-19 hotline 326-950-3859 or PCP office for questions related to COVID-19. For more information on steps you can take to protect yourself, see CDC's How to Protect Yourself     Patient/family/caregiver given information for GetWell Loop and agrees to enroll no     Discussed follow-up appointments. If no appointment was previously scheduled, appointment scheduling offered: Yes. Is follow up appointment scheduled within 7 days of discharge? Yes  Non-[facility] follow up appointment(s):     Plan for follow-up call in 5-7 days based on severity of symptoms and risk factors. Plan for next call: respiratory status, any s/s bleeding, HFU  CTN provided contact information for future needs.           Care Transitions 24 Hour Call    Do you have any ongoing symptoms?: No  Do you have a copy of your discharge instructions?: Yes  Do you have all of your prescriptions and are they filled?: Yes  Have you been contacted by a [employer] [address]?: No  Have you scheduled your follow up appointment?: Yes  How are you going to get to your appointment?: Car - family or friend to transport  Were you discharged with any Home Care or Post Acute Services: No  Do you feel like you have everything you need to keep you well at home?: Yes  Care Transitions Interventions Follow Up : Spoke with patient today for initial Covid call after discharge from [facility]. He says he is doing good, says he is walking better. Says swelling is minimal, he says it goes away when he elevates his legs. He has his medications, was given instructions on Lovenox injections prior to discharge. He has HFU with PCP on March 5th with DR. Ambreen Bansal and Hematology/Oncology on March 19th with Andrew Garcia NP. He says his appetite is not great, but making himself eat. CTN discussed protein drinks [employer], keeping snacks at table side, as he would be more apt to snacking if it was readily available. Discussed bleeding precautions with him today. He is taking meds as prescribed, stopped Xarelto. No problems with SOA he says. Discussed Covid calls and follow up and he is accepting. He does not have a LW/AD, CTN advised him to discuss with family Encouraged to call with prn needs, and to make sure and keep appointments and follow up labs. Will follow up at a later time.    Future Appointments   Date Time Provider Ayesha Wild   3/19/2021 10:15  [facility], [facility]   3/19/2021 10:30 AM SCHEDULE, [facility] MED ONC [facility] MED ONC Martha Elizalde RN

## 2021-03-08 ENCOUNTER — CARE COORDINATION (OUTPATIENT)
Dept: CASE MANAGEMENT | Age: 56
End: 2021-03-08

## 2021-03-08 NOTE — CARE COORDINATION
[facility] Transitions Follow Up Call    3/8/2021    Patient: Lauren Saravia  Patient : 1965   MRN: [id number]  Reason for Admission:   Discharge Date: 21 RARS: Readmission Risk Score: 9         Spoke with: Lauren Saravia      Care Transitions Subsequent and Final Call    Subsequent and Final Calls  Do you have any ongoing symptoms?: No  Have your medications changed?: No  Do you have any questions related to your medications?: No  Do you currently have any active services?: No  Do you have any needs or concerns that I can assist you with?: No  Identified Barriers: None  Care Transitions Interventions  Other Interventions: Follow Up : Spoke with patient today and he says he is doing well. Says he finished up with Lovenox last week, and his doctor at [facility] is working with his coumadin dosages to try to get him to a therapeutic level between 2-3. He says his leg is still somewhat sore, but he  Is not having any SOA. He says he is keeping his follow up appointments as he should. No problems or complaints. No s/s of Covid. Will follow up at a later time.   Future Appointments   Date Time Provider Ayesha Wild   3/19/2021 10:15  [facility]   3/19/2021 10:30 AM SCHEDULE, L MED ONC MA L MED ONC Martha Briscoe RN

## 2021-03-15 ENCOUNTER — CARE COORDINATION (OUTPATIENT)
Dept: CASE MANAGEMENT | Age: 56
End: 2021-03-15

## 2021-03-15 DIAGNOSIS — I82.409 LEG DVT (DEEP VENOUS THROMBOEMBOLISM), ACUTE, UNSPECIFIED LATERALITY (HCC): Primary | ICD-10-CM

## 2021-03-15 NOTE — CARE COORDINATION
[facility] Transitions Follow Up Call    3/15/2021    Patient: Smitha Marroquin  Patient : 1965   MRN: [id number]  Reason for Admission:   Discharge Date: 21 RARS: Readmission Risk Score: 9         Spoke with: Smitha Marroquin    Care Transitions Subsequent and Final Call    Subsequent and Final Calls  Do you have any ongoing symptoms?: No  Have your medications changed?: No  Do you have any questions related to your medications?: No  Do you currently have any active services?: No  Do you have any needs or concerns that I can assist you with?: No  Identified Barriers: None  Care Transitions Interventions  Other Interventions: Follow Up : Spoke with patient today for follow up Covid call. He says he has been to see his PCP at [facility]. Says his blood is getting therapeutic and with INR of 2.5. He says he is still somewhat weak, not been doing much trying to rest as much as he can. He has had an issue with his left foot, and got some steroids, and gabapentin for nerve pain. His PCP is getting him in with Dr. James Chen regarding the foot pain. He says he follows up with Hematology/Oncology on Friday. He says PCP is wanting him to get up more, move about and build up his strength. He says when the weather is better this week, he is getting out in the yard for a walk. He is ready to be back to himself. He said his Medicaid has been approved, and he was able to get his medications filled today. He is contacting the hospital to let them know as well. CTN did give him main line number to ask for billing. No s/s of Covid, is following up with providers as scheduled and working towards getting better. Will resolve calls to patient at this time.    Future Appointments   Date Time Provider Ayesha Wild   3/19/2021 10:15 AM [employer], APRN N PAD [facility]   3/19/2021 10:30 AM SCHEDULE, [facility] ON[facility] MA L MED ONC Martha Santamaria RN

## 2021-03-19 ENCOUNTER — OFFICE VISIT (OUTPATIENT)
Dept: HEMATOLOGY | Age: 56
End: 2021-03-19
Payer: MEDICAID

## 2021-03-19 ENCOUNTER — HOSPITAL ENCOUNTER (OUTPATIENT)
Dept: INFUSION THERAPY | Age: 56
Discharge: HOME | End: 2021-03-19
Payer: MEDICAID

## 2021-03-19 VITALS
DIASTOLIC BLOOD PRESSURE: 88 MMHG | OXYGEN SATURATION: 98 % | BODY MASS INDEX: 31.21 KG/M2 | HEART RATE: 75 BPM | WEIGHT: 230.4 LBS | TEMPERATURE: 96.8 F | HEIGHT: 72 IN | SYSTOLIC BLOOD PRESSURE: 142 MMHG

## 2021-03-19 DIAGNOSIS — I26.99 ACUTE PULMONARY EMBOLISM WITHOUT ACUTE COR PULMONALE, UNSPECIFIED PULMONARY EMBOLISM TYPE (HCC): Primary | ICD-10-CM

## 2021-03-19 DIAGNOSIS — I82.409 LEG DVT (DEEP VENOUS THROMBOEMBOLISM), ACUTE, UNSPECIFIED LATERALITY (HCC): ICD-10-CM

## 2021-03-19 DIAGNOSIS — N18.31 STAGE 3A CHRONIC KIDNEY DISEASE (HCC): ICD-10-CM

## 2021-03-19 DIAGNOSIS — Z79.01 ANTICOAGULATED ON COUMADIN: ICD-10-CM

## 2021-03-19 LAB
BASOPHILS ABSOLUTE: 0.03 K/UL (ref 0.01–0.08)
BASOPHILS RELATIVE PERCENT: 0.3 % (ref 0.1–1.2)
EOSINOPHILS ABSOLUTE: 0.12 K/UL (ref 0.04–0.54)
EOSINOPHILS RELATIVE PERCENT: 1.2 % (ref 0.7–7)
HCT VFR BLD CALC: 40.2 % (ref 40.1–51)
HEMOGLOBIN: 13.9 G/DL (ref 13.7–17.5)
LYMPHOCYTES ABSOLUTE: 2.45 K/UL (ref 1.18–3.74)
LYMPHOCYTES RELATIVE PERCENT: 23.5 % (ref 19.3–53.1)
MCH RBC QN AUTO: 35.2 PG (ref 25.7–32.2)
MCHC RBC AUTO-ENTMCNC: 34.6 G/DL (ref 32.3–36.5)
MCV RBC AUTO: 101.8 FL (ref 79–92.2)
MONOCYTES ABSOLUTE: 0.67 K/UL (ref 0.24–0.82)
MONOCYTES RELATIVE PERCENT: 6.4 % (ref 4.7–12.5)
NEUTROPHILS ABSOLUTE: 7.16 K/UL (ref 1.56–6.13)
NEUTROPHILS RELATIVE PERCENT: 68.6 % (ref 34–71.1)
PDW BLD-RTO: 11.7 % (ref 11.6–14.4)
PLATELET # BLD: 220 K/UL (ref 163–337)
PMV BLD AUTO: 10.5 FL (ref 7.4–10.4)
RBC # BLD: 3.95 M/UL (ref 4.63–6.08)
WBC # BLD: 10.43 K/UL (ref 4.23–9.07)

## 2021-03-19 PROCEDURE — 99211 OFF/OP EST MAY X REQ PHY/QHP: CPT

## 2021-03-19 PROCEDURE — 99213 OFFICE O/P EST LOW 20 MIN: CPT | Performed by: NURSE PRACTITIONER

## 2021-03-19 PROCEDURE — 85025 COMPLETE CBC W/AUTO DIFF WBC: CPT

## 2021-03-19 RX ORDER — DEXTROMETHORPHAN HYDROBROMIDE AND PROMETHAZINE HYDROCHLORIDE 15; 6.25 MG/5ML; MG/5ML
SYRUP ORAL
COMMUNITY
Start: 2021-03-15 | End: 2021-04-16

## 2021-03-19 RX ORDER — METHYLPREDNISOLONE 4 MG/1
4 TABLET ORAL SEE ADMIN INSTRUCTIONS
COMMUNITY
End: 2021-04-16

## 2021-03-19 RX ORDER — NITROFURANTOIN 25; 75 MG/1; MG/1
CAPSULE ORAL
COMMUNITY
Start: 2021-03-17 | End: 2021-04-16

## 2021-03-19 RX ORDER — GABAPENTIN 300 MG/1
CAPSULE ORAL
COMMUNITY
Start: 2021-03-15 | End: 2021-04-16

## 2021-03-19 RX ORDER — ALBUTEROL SULFATE 90 UG/1
2 AEROSOL, METERED RESPIRATORY (INHALATION) EVERY 6 HOURS PRN
COMMUNITY
Start: 2021-03-15

## 2021-03-19 ASSESSMENT — ENCOUNTER SYMPTOMS
NAUSEA: 0
EYE ITCHING: 0
SHORTNESS OF BREATH: 0
BLOOD IN STOOL: 0
ABDOMINAL PAIN: 0
EYE DISCHARGE: 0
DIARRHEA: 0
TROUBLE SWALLOWING: 0
VOMITING: 0
SORE THROAT: 0
WHEEZING: 0
COUGH: 1
CONSTIPATION: 0

## 2021-03-19 NOTE — PROGRESS NOTES
Progress Note      Pt Name: Celena Bautista  YOB: 1965  MRN: [id number]    Date of evaluation: 3/19/2021  History Obtained From:  Patient, EMR    Portions of this note have been copied forward, however, changed to reflect the most current clinical status of this patient. Chief Complaint   Patient presents with    Follow-up      Follow Up       HISTORY OF PRESENT ILLNESS:    Maggie Weldon is a 70-year-old  gentleman evaluated at [facility] 2/24/2021 for an unprovoked RLL PE and RLE DVT. He was initiated on Lovenox, transition to warfarin. He is currently treated with warfarin 5 mg daily, managed by his PCP, Dr. Erlinda Ashraf. Halima Kumar denies bleeding issues. He denies shortness of breath or hemoptysis. His right calf has remained mildly swollen, compared to the left. Blood pressure is mildly elevated 142/88. Jose G complains of left ankle pain. He has a history of ankle surgery in the past.  He is scheduled for orthopedic evaluation 4/1/2021. He returns to the clinic to discuss labs and complete prothrombotic work-up. Diagnosis  · Unprovoked VTE     Treatment summary  · Lovenox-> warfarin (INR 2-3)     Maggie Weldon was first seen by Dr. Elen Gudino on 2/24/2021. The patient was recently diagnosed with right lower extremity DVT on 2/8/2020. A right lower extremity venous ultrasound showed evidence of subacute deep vein thrombosis in the right lower extremity involving the popliteal, peroneal and posterior tibial veins. He was started on Xarelto on 2/8/2021. He presented to the ER department 2/23/2021 with persistent/worsening right lower extremity pain. The pain has been persistent since his diagnosis. Denies any recent surgery, trauma, trips, no recent history of COVID-19. A repeat right venous extremity ultrasound showed persistent subacute deep vein thrombosis in the right lower extremity.   A CT chest angiogram showed filling defect to the right lower lobe arterial supply consistent with pulmonary emboli. · 2021Vl Dup Lower Extremity Venous Right- evidence of subacute deep vein thrombosis in the right lower  extremity involving the popliteal, peroneal, and posterior tibial veins. There is no evidence of superficial thrombophlebitis of the right lower  Extremity. · 2021Vl Extremity Venous Right-evidence of subacute deep vein thrombosis in the right lower  extremity involving the popliteal, peroneal, posterior tibial, and soleal  veins. There is no evidence of superficial thrombophlebitis of the right lower  extremity. · TA Pulmonary W Contrast-Filling defect to the right lower lobe arterial supply (image 93, series 5), consistent with pulmonary embolus. . RV to LV ratio is 0.7. Heart: Mild cardiomegaly. · 2021hematology consultation. · D echo showed normal LVEF 60% . RV was not well seen but appeared mildly dilated with normal systolic function. RVSP 16.    Labs 2021:  · Anticardiolipin IgG: 3, IgM 13 (indeterminate)  · Lupus anticoagulant: Not detected  · Beta-2 glycoprotein Ig, IgM 2    Jose G does not meet criteria for APS  Completion of prothrombotic work-up requested 3/19/2021    Past Medical History:   Diagnosis Date    Bleeds easily [facility])     ever since colon surgery    Headache(784.0)     Hyperlipidemia     mild; pcp never called in meds/no meds.      Kidney stone     hx of    S/P cardiac cath 2011    Thyroid disease     Ventral hernia      Past Surgical History:   Procedure Laterality Date    ANKLE SURGERY      chip, fracture, and then tendon repair; surgery x 2    CARDIAC CATHETERIZATION      dr. yarbrough/normal/throught the wrist    CHOLECYSTECTOMY      COLON SURGERY      polyps that ruptured, colostomy takedown    GALLBLADDER SURGERY      HERNIA REPAIR N/A 2020    ROBOTIC ASSISTED LAPAROSCOPIC VENTRAL HERNIA REPAIR TIMES TWO performed by Gladys Cary DO at [facility] KIDNEY STONE SURGERY  96    KNEE ARTHROSCOPY  07     Current Outpatient Medications   Medication Sig Dispense Refill    albuterol sulfate  (90 Base) MCG/ACT inhaler 2 puffs every 6 hours as needed for Wheezing or Shortness of Breath       gabapentin (NEURONTIN) 300 MG capsule TAKE 1 CAPSULE BY MOUTH AT BEDTIME      nitrofurantoin, macrocrystal-monohydrate, (MACROBID) 100 MG capsule       promethazine-dextromethorphan (PROMETHAZINE-DM) 6.25-15 MG/5ML syrup TAKE 1-2 TEASPOONFUL (5-10ML) BY MOUTH AT BEDTIME AS NEEDED FOR COUGH      methylPREDNISolone (MEDROL DOSEPACK) 4 MG tablet Take 4 mg by mouth See Admin Instructions Take by mouth.  warfarin (COUMADIN) 5 MG tablet Take 1 tablet by mouth daily 30 tablet 0     No current facility-administered medications for this visit. Allergies   Allergen Reactions    Reglan [Metoclopramide Hcl] Shortness Of Breath     \"cuts my breathing off\"    Allopurinol Rash    Cephalexin Rash     Keflex causes a rash     Social History     Tobacco Use    Smoking status: Never Smoker    Smokeless tobacco: Current User     Types: Snuff    Tobacco comment: dips one can every 3 days. Substance Use Topics    Alcohol use: No    Drug use: No     Family History   Problem Relation Age of Onset   Edouard Grayson Cancer Mother         lung     Diabetes Mother     Other Mother         liver disease    Coronary Art Dis Father     Cancer Father         lung    Hypertension Father     Heart Attack Father 43        with CHF    Stroke Father        Review of Systems   Constitutional: Negative for fatigue and fever. HENT: Negative for dental problem, hearing loss, mouth sores, nosebleeds, sore throat and trouble swallowing. Eyes: Negative for discharge and itching. Respiratory: Positive for cough. Negative for shortness of breath and wheezing. Cardiovascular: Positive for leg swelling (RLE). Negative for chest pain and palpitations.    Gastrointestinal: Negative for abdominal pain, blood in stool, constipation, diarrhea, nausea and vomiting. Endocrine: Negative for cold intolerance and heat intolerance. Genitourinary: Negative for dysuria, frequency, hematuria and urgency. Musculoskeletal: Positive for arthralgias and joint swelling (left ankle). Negative for myalgias. Skin: Negative for pallor and rash. Allergic/Immunologic: Negative for environmental allergies and immunocompromised state. Neurological: Negative for seizures, syncope and numbness. Hematological: Negative for adenopathy. Bruises/bleeds easily. Psychiatric/Behavioral: Negative for agitation, behavioral problems and confusion. The patient is not nervous/anxious. Physical Exam  Vitals signs reviewed. Constitutional:       General: He is not in acute distress. Appearance: He is well-developed. He is obese. He is not toxic-appearing or diaphoretic. Comments: Wearing a facial mask. HENT:      Head: Normocephalic and atraumatic. Right Ear: External ear normal.      Left Ear: External ear normal.      Nose: Nose normal.      Mouth/Throat:      Mouth: Mucous membranes are moist.   Eyes:      General: No scleral icterus. Right eye: No discharge. Left eye: No discharge. Conjunctiva/sclera: Conjunctivae normal.   Neck:      Musculoskeletal: Neck supple. No muscular tenderness. Trachea: No tracheal deviation. Cardiovascular:      Rate and Rhythm: Normal rate and regular rhythm. Pulmonary:      Effort: Pulmonary effort is normal. No respiratory distress. Breath sounds: Normal breath sounds. No wheezing or rales. Abdominal:      General: Bowel sounds are normal. There is no distension. Palpations: Abdomen is soft. Tenderness: There is no abdominal tenderness. There is no guarding. Genitourinary:     Comments: Exam deferred  Musculoskeletal:         General: No tenderness or deformity. Right lower leg: Edema (calf trace - 1+) present. Comments: Normal ROM all four extremities   Lymphadenopathy:      Cervical:      Right cervical: No superficial or deep cervical adenopathy. Left cervical: No superficial or deep cervical adenopathy. Upper Body:      Right upper body: No supraclavicular adenopathy. Left upper body: No supraclavicular adenopathy. Comments:      Skin:     General: Skin is warm and dry. Findings: No rash. Neurological:      Mental Status: He is alert and oriented to person, place, and time. Comments: follows commands, non-focal   Psychiatric:         Behavior: Behavior normal. Behavior is cooperative. Thought Content: Thought content normal.         Judgment: Judgment normal.      Comments: Alert and oriented to person, place and time. Vitals:    21 1028   BP: (!) 142/88   Pulse: 75   Temp: 96.8 °F (36 °C)   SpO2: 98%   Weight: 230 lb 6.4 oz (104.5 kg)   Height: 6' (1.829 m)      Wt Readings from Last 3 Encounters:   21 230 lb 6.4 oz (104.5 kg)   21 221 lb (100.2 kg)   21 240 lb (108.9 kg)       LABS:  CBC 3/19/2021:   Lab Results   Component Value Date    WBC 10.43 (H) 2021    HGB 13.9 2021    HCT 40.2 2021    .8 (H) 2021     2021     ASSESSMENT/PLAN:  1. Acute pulmonary embolism without acute cor pulmonale, unspecified pulmonary embolism type (HCC)    2. Leg DVT (deep venous thromboembolism), acute, unspecified laterality (HCC)    3. Stage 3a chronic kidney disease    4. Anticoagulated on Coumadin    5. BMI 31.0-31.9,adult      #Venous thromboembolism (segmental PE , unprovoked 2021)/RLE DVT  Risk factors (overweight)  tarted on Xarelto - unable to afford.  Changed to Warfarin, with Lovenox bridge  2021negative troponin, normal proBNP  2D echo Normal LVEF     Labs 2021:  · Anticardiolipin IgG: 3, IgM 13 (indeterminate)  · Lupus anticoagulant: Not detected  · Beta-2 glycoprotein Ig, IgM 2 Patient does not meet criteria for antiphospholipid syndrome  Continue Warfarin, managed by PCP     #BMI 31.0-31.9  Body mass index is 31.25 kg/m². BMI is above goal.  Federal guidelines recommend that people under the age of 72 should have a BMI of 18.5-25 and people age 72 and older should have a BMI of 23-30. If yours is outside the range, we recommend you utilize a diet and exercise program to get yours into the range. We also recommend you speak with your primary care doctor should any specific advice be needed regarding special diets or programs which would be appropriate for your circumstances. #Chronic kidney disease stage IIIAcreatinine 1.4/GFR 53 on 2/24/2021        PLAN:  · Negative for APS  · thrombophilia work-up to include WARREN-2 MPN, MPL and PNH flow and standard tests as noted below  · Recommended age appropriated cancer screeningfollow-up with primary care physician to arrange this, patient states colonoscopy due 9/2021    Orders Placed This Encounter   Procedures    Factor 5 Leiden    Miscellaneous Sendout 1 - FLOW For PNH    Protein C Functional    Antithrombin III Activity    Miscellaneous Sendout 1 - JAK2 with reflexes    Miscellaneous Sendout 1 - Free protein S       Return in about 4 weeks (around 4/16/2021) for follow up with JON Alaniz - telephone visit to discuss lab results. I have seen, examined and reviewed patient medication list, appropriate labs and imaging studies. Relevant medical records and other physician notes have been reviewed. I answered all questions to the best of my knowledge and to the patient's satisfaction. Dictated utilizing Dragon transcription software.          JON Aquino  3:49 PM  3/19/2021

## 2021-04-16 ENCOUNTER — VIRTUAL VISIT (OUTPATIENT)
Dept: HEMATOLOGY | Age: 56
End: 2021-04-16
Payer: MEDICAID

## 2021-04-16 ENCOUNTER — TELEPHONE (OUTPATIENT)
Dept: HEMATOLOGY | Age: 56
End: 2021-04-16

## 2021-04-16 DIAGNOSIS — N18.31 STAGE 3A CHRONIC KIDNEY DISEASE (HCC): ICD-10-CM

## 2021-04-16 DIAGNOSIS — Z79.01 ANTICOAGULATED ON COUMADIN: ICD-10-CM

## 2021-04-16 DIAGNOSIS — I26.99 ACUTE PULMONARY EMBOLISM WITHOUT ACUTE COR PULMONALE, UNSPECIFIED PULMONARY EMBOLISM TYPE (HCC): Primary | ICD-10-CM

## 2021-04-16 DIAGNOSIS — I82.401 ACUTE DEEP VEIN THROMBOSIS (DVT) OF RIGHT LOWER EXTREMITY, UNSPECIFIED VEIN (HCC): ICD-10-CM

## 2021-04-16 PROCEDURE — 99212 OFFICE O/P EST SF 10 MIN: CPT | Performed by: NURSE PRACTITIONER

## 2021-04-16 RX ORDER — CHOLECALCIFEROL (VITAMIN D3) 1250 MCG
1 CAPSULE ORAL
COMMUNITY

## 2021-04-16 RX ORDER — LEVOTHYROXINE SODIUM 0.03 MG/1
25 TABLET ORAL DAILY
COMMUNITY

## 2021-04-16 ASSESSMENT — ENCOUNTER SYMPTOMS
WHEEZING: 0
DIARRHEA: 0
TROUBLE SWALLOWING: 0
ABDOMINAL PAIN: 0
COUGH: 0
BLOOD IN STOOL: 0
EYE DISCHARGE: 0
EYE ITCHING: 0
VOMITING: 0
SORE THROAT: 0
NAUSEA: 0
CONSTIPATION: 0
SHORTNESS OF BREATH: 0

## 2021-04-16 NOTE — TELEPHONE ENCOUNTER
Called and got patient registered for telephone visit with [employer]. He accepted the insurance disclaimer and had no copay.

## 2021-04-16 NOTE — PROGRESS NOTES
Pt Name: Shaheed Arias  YOB: 1965  MRN: [id number]    History Obtained From:  Patient, Eric Barker is a 49 y.o. male evaluated via telephone on 4/16/2021. Chief Complaint   Patient presents with    Discuss Labs       Consent:  He and/or health care decision maker is aware that that he may receive a bill for this telephone service, depending on his insurance coverage, and has provided verbal consent to proceed: Yes      Documentation:  I communicated with the patient and/or health care decision maker about prothrombotic evaluation. Details of this discussion including any medical advice provided: As noted in the assessment and plan below. I affirm this is a Patient Initiated Episode with a Patient who has not had a related appointment within my department in the past 7 days or scheduled within the next 24 hours. Patient identification was verified at the start of the visit: Yes    HISTORY OF PRESENT ILLNESS:    Arina Anderson is a 53-year-old  gentleman who requests telephone visit to discuss serology completed regarding an unprovoked RLL PE and RLE DVT on 2/24/2021. He was initially treated with Lovenox, transition to warfarin. He is currently treated with warfarin 5 mg daily, managed by his PCP, Dr. Leo Ugarte. He denies missed doses and states INR has been therapeutic.     Jose G denies chest pain, shortness of breath, heart palpitations or claudication. He has mild right leg/ankle edema after working all day with resolution after lower extremity elevation.   Since last evaluated, Dwayne Schultz states he has been initiated on vitamin D weekly and levothyroxine by his PCP.     Diagnosis  · Unprovoked VTE     Treatment summary  · Lovenox-> warfarin (INR 2-3)     Arina Anderson was first seen by Dr. Arnol West 2/24/2021. [facility] patient was recently diagnosed with right lower extremity DVT on 2/8/2020.  A right lower extremity venous ultrasound showed evidence of subacute deep vein thrombosis in the right lower extremity involving the popliteal, peroneal and posterior tibial veins. He was started on Xarelto on 2021. Genoveva Guzman presented to the ER department 2021 with persistent/worsening right lower extremity pain.  The pain has been persistent since his diagnosis.  Denies any recent surgery, trauma, trips, no recent history of COVID-19.  A repeat right venous extremity ultrasound showed persistent subacute deep vein thrombosis in the right lower extremity.  A CT chest angiogram showed filling defect to the right lower lobe arterial supply consistent with pulmonary emboli. · 2021Vl Dup Lower Extremity Venous Right- evidence of subacute deep vein thrombosis in the right lower  extremity involving the popliteal, peroneal, and posterior tibial veins.  There is no evidence of superficial thrombophlebitis of the right lower  Extremity. · 2021Vl Extremity Venous Right-evidence of subacute deep vein thrombosis in the right lower  extremity involving the popliteal, peroneal, posterior tibial, and soleal  veins.  There is no evidence of superficial thrombophlebitis of the right lower  extremity.     · TA Pulmonary W Contrast- Filling defect to the right lower lobe arterial supply (image 93, series 5), consistent with pulmonary embolus. . RV to LV ratio is 0.7. Heart: Mild cardiomegaly. · 2021hematology consultation. · D echo showed normal LVEF 60% . RV was not well seen but appeared mildly dilated with normal systolic function.   RVSP 16.     Labs 2021:  · Anticardiolipin IgG: 3, IgM 13 (indeterminate)  · Lupus anticoagulant: Not detected  · Beta-2 glycoprotein Ig, IgM 2     Jose G does not meet criteria for APS    Completion of prothrombotic work-up 3/19/2021:  · JAK2 V617F, CALR, exon 12-15, MPL  · FLOW negative for PNH  · Factor V Leiden: Negative  · Factor II: Negative  · Antithrombin activity: 106%  · Functional protein C: 57% ()      PLAN:  · Negative for APS  · Recommend lifelong anticoagulation due to unprovoked PE/DVT (continue warfarin)  · Will need free protein S       Past Medical History:   Diagnosis Date    Bleeds easily ([address]     ever since colon surgery    Headache(784.0)     Hyperlipidemia     mild; pcp never called in meds/no meds.  Kidney stone     hx of    S/P cardiac cath 07/14/2011    Thyroid disease     Ventral hernia      Past Surgical History:   Procedure Laterality Date    ANKLE SURGERY      chip, fracture, and then tendon repair; surgery x 2    CARDIAC CATHETERIZATION  2011    dr. yarbrough/normal/throught the wrist    CHOLECYSTECTOMY      COLON SURGERY      polyps that ruptured, colostomy takedown    GALLBLADDER SURGERY      HERNIA REPAIR N/A 2/21/2020    ROBOTIC ASSISTED LAPAROSCOPIC VENTRAL HERNIA REPAIR TIMES TWO performed by Leonard Young DO at [address] [place] [address]    KNEE ARTHROSCOPY  07     Current Outpatient Medications   Medication Sig Dispense Refill    Cholecalciferol (VITAMIN D3) 1.25 MG (71057 UT) CAPS Take 1 each by mouth every 7 days      levothyroxine (SYNTHROID) 25 MCG tablet Take 25 mcg by mouth Daily Patient uncertain of dose      albuterol sulfate  (90 Base) MCG/ACT inhaler 2 puffs every 6 hours as needed for Wheezing or Shortness of Breath       warfarin (COUMADIN) 5 MG tablet Take 1 tablet by mouth daily 30 tablet 0     No current facility-administered medications for this visit. Allergies   Allergen Reactions    Reglan [Metoclopramide Hcl] Shortness Of Breath     \"cuts my breathing off\"    Allopurinol Rash    Cephalexin Rash     Keflex causes a rash     Social History     Tobacco Use    Smoking status: Never Smoker    Smokeless tobacco: Current User     Types: Snuff    Tobacco comment: dips one can every 3 days.     Substance Use Topics    Alcohol use: No    Drug use: No     Family History   Problem Relation Age of Onset    2021:  · Anticardiolipin IgG: 3, IgM 13 (indeterminate)  · Lupus anticoagulant: Not detected  · Beta-2 glycoprotein Ig, IgM 2     Patient does not meet criteria for antiphospholipid syndrome    Labs 3/19/2021:  · JAK2 V617F, CALR, exon 12-15, MPL  · FLOW negative for PNH  · Factor V Leiden: Negative  · Factor II: Negative  · Antithrombin activity: 106%  · Functional protein C: 57% ()    Will need free protein S with next labs  Continue Warfarin, managed by PCP     #BMI 31.0-31.9  Body mass index is 31.25 kg/m². BMI is above goal.  Federal guidelines recommend that people under the age of 72 should have a BMI of 18.5-25 and people age 72 and older should have a BMI of 23-30. If yours is outside the range, we recommend you utilize a diet and exercise program to get yours into the range. We also recommend you speak with your primary care doctor should any specific advice be needed regarding special diets or programs which would be appropriate for your circumstances.     #Chronic kidney disease stage IIIAcreatinine 1.4/GFR 53 on 2021      PLAN:  · Negative for APS  · Recommend lifelong anticoagulation due to unprovoked PE/DVT  · Warfarin, as managed per PCP  · Recommended age appropriated cancer screeningfollow-up with primary care physician to arrange this, patient states colonoscopy due 2021    No orders of the defined types were placed in this encounter. Return in about 4 months (around 2021) for follow up with JON Thomas. Total Time: minutes: 5-10 minutes. Note: not billable if this call serves to triage the patient into an appointment for the relevant concern      I have seen, examined and reviewed patient medication list, appropriate labs and imaging studies. Relevant medical records and other physician notes have been reviewed. I answered all questions to the best of my knowledge and to the patient's satisfaction.     Dictated utilizing [employer] transcription software.          JON Aguila  3:11 PM  4/16/2021

## 2021-07-06 ENCOUNTER — PRE-ADMISSION TESTING (OUTPATIENT)
Dept: PREADMISSION TESTING | Facility: HOSPITAL | Age: 56
End: 2021-07-06

## 2021-07-06 VITALS
OXYGEN SATURATION: 98 % | DIASTOLIC BLOOD PRESSURE: 91 MMHG | BODY MASS INDEX: 35.16 KG/M2 | HEART RATE: 80 BPM | WEIGHT: 245.59 LBS | HEIGHT: 70 IN | RESPIRATION RATE: 20 BRPM | SYSTOLIC BLOOD PRESSURE: 131 MMHG

## 2021-07-06 LAB
DEPRECATED RDW RBC AUTO: 47 FL (ref 37–54)
ERYTHROCYTE [DISTWIDTH] IN BLOOD BY AUTOMATED COUNT: 13 % (ref 12.3–15.4)
HCT VFR BLD AUTO: 41.2 % (ref 37.5–51)
HGB BLD-MCNC: 14.6 G/DL (ref 13–17.7)
MCH RBC QN AUTO: 35 PG (ref 26.6–33)
MCHC RBC AUTO-ENTMCNC: 35.4 G/DL (ref 31.5–35.7)
MCV RBC AUTO: 98.8 FL (ref 79–97)
PLATELET # BLD AUTO: 216 10*3/MM3 (ref 140–450)
PMV BLD AUTO: 10.5 FL (ref 6–12)
RBC # BLD AUTO: 4.17 10*6/MM3 (ref 4.14–5.8)
WBC # BLD AUTO: 7.24 10*3/MM3 (ref 3.4–10.8)

## 2021-07-06 PROCEDURE — 36415 COLL VENOUS BLD VENIPUNCTURE: CPT

## 2021-07-06 PROCEDURE — 85027 COMPLETE CBC AUTOMATED: CPT

## 2021-07-06 RX ORDER — ERGOCALCIFEROL 1.25 MG/1
50000 CAPSULE ORAL
COMMUNITY

## 2021-07-06 RX ORDER — WARFARIN SODIUM 5 MG/1
5 TABLET ORAL NIGHTLY
COMMUNITY

## 2021-07-06 RX ORDER — GABAPENTIN 100 MG/1
300 CAPSULE ORAL NIGHTLY
COMMUNITY

## 2021-07-06 RX ORDER — LEVOTHYROXINE SODIUM 0.03 MG/1
50 TABLET ORAL DAILY
COMMUNITY

## 2021-07-06 NOTE — DISCHARGE INSTRUCTIONS
DAY OF SURGERY INSTRUCTIONS        YOUR SURGEON: DR. ALMA ROSA ALVES    PROCEDURE: LATERAL ANKLE STABILIZATION AND REPAIR ANTERIOR TALOFIBULAR LIGAMENT AND CALCANEOFIBULAR LIGAMENT WITH REMOVAL OF FOREIGN BODY SINUS TARSI--LEFT ANKLE    DATE OF SURGERY: July 20, 2021    ARRIVAL TIME: AS DIRECTED BY OFFICE    YOU MAY TAKE THE FOLLOWING MEDICATION(S) THE MORNING OF SURGERY WITH A SIP OF WATER: NONE      ALL OTHER HOME MEDICATION CHECK WITH YOUR PHYSICIAN      DO NOT TAKE ANY ERECTILE DYSFUNCTION MEDICATIONS (EX: CIALIS, VIAGRA) 24 HOURS PRIOR TO SURGERY                      MANAGING PAIN AFTER SURGERY    We know you are probably wondering what your pain will be like after surgery.  Following surgery it is unrealistic to expect you will not have pain.   Pain is how our bodies let us know that something is wrong or cautions us to be careful.  That said, our goal is to make your pain tolerable.    Methods we may use to treat your pain include (oral or IV medications, PCAs, epidurals, nerve blocks, etc.)   While some procedures require IV pain medications for a short time after surgery, transitioning to pain medications by mouth allows for better management of pain.   Your nurse will encourage you to take oral pain medications whenever possible.  IV medications work almost immediately, but only last a short while.  Taking medications by mouth allows for a more constant level of medication in your blood stream for a longer period of time.      Once your pain is out of control it is harder to get back under control.  It is important you are aware when your next dose of pain medication is due.  If you are admitted, your nurse may write the time of your next dose on the white board in your room to help you remember.      We are interested in your pain and encourage you to inform us about aggravating factors during your visit.   Many times a simple repositioning every few hours can make a big difference.    If your physician  says it is okay, do not let your pain prevent you from getting out of bed. Be sure to call your nurse for assistance prior to getting up so you do not fall.      Before surgery, please decide your tolerable pain goal.  These faces help describe the pain ratings we use on a 0-10 scale.   Be prepared to tell us your goal and whether or not you take pain or anxiety medications at home.          BEFORE YOU COME TO THE HOSPITAL  (Pre-op instructions)  • Do not eat, drink, smoke or chew gum after midnight the night before surgery.  This also includes no mints.  • Morning of surgery take only the medicines you have been instructed with a sip of water unless otherwise instructed  by your physician.  • Do not shave, wear makeup or dark nail polish.  • Remove all jewelry including rings.  • Leave anything you consider valuable at home.  • Leave your suitcase in the car until after your surgery.  • Bring the following with you if applicable:  o Picture ID and insurance, Medicare or Medicaid cards  o Co-pay/deductible required by insurance (cash, check, credit card)  o Copy of advance directive, living will or power-of- documents if not brought to PAT  o CPAP or BIPAP mask and tubing  o Relaxation aids ( book, magazine), etc.  o Hearing aids                        ON THE DAY OF SURGERY  · On the day of surgery check in at registration located at the main entrance of the hospital.   ? You will be registered and given a beeper with instructions where to wait in the main lobby.  ? When your beeper lights up and vibrates a member of the Outpatient Surgery staff will meet you at the double doors under the stair steps and escort you to your preoperative room.   · You may have cloth compression devices placed on your legs. These help to prevent blood clots and reduce swelling in your legs.  · An IV may be inserted into one of your veins.  · In the operating room, you may be given one or more of the following:  ? A medicine to  "help you relax (sedative).  ? A medicine to numb the area (local anesthetic).  ? A medicine to make you fall asleep (general anesthetic).  ? A medicine that is injected into an area of your body to numb everything below the injection site (regional anesthetic).  · Your surgical site will be marked or identified.  · You may be given an antibiotic through your IV to help prevent infection.  Contact a health care provider if you:  · Develop a fever of more than 100.4°F (38°C) or other feelings of illness during the 48 hours before your surgery.  · Have symptoms that get worse.  Have questions or concerns about your surgery    General Anesthesia/Surgery, Adult  General anesthesia is the use of medicines to make a person \"go to sleep\" (unconscious) for a medical procedure. General anesthesia must be used for certain procedures, and is often recommended for procedures that:  · Last a long time.  · Require you to be still or in an unusual position.  · Are major and can cause blood loss.  The medicines used for general anesthesia are called general anesthetics. As well as making you unconscious for a certain amount of time, these medicines:  · Prevent pain.  · Control your blood pressure.  · Relax your muscles.  Tell a health care provider about:  · Any allergies you have.  · All medicines you are taking, including vitamins, herbs, eye drops, creams, and over-the-counter medicines.  · Any problems you or family members have had with anesthetic medicines.  · Types of anesthetics you have had in the past.  · Any blood disorders you have.  · Any surgeries you have had.  · Any medical conditions you have.  · Any recent upper respiratory, chest, or ear infections.  · Any history of:  ? Heart or lung conditions, such as heart failure, sleep apnea, asthma, or chronic obstructive pulmonary disease (COPD).  ?  service.  ? Depression or anxiety.  · Any tobacco or drug use, including marijuana or alcohol use.  · Whether you " are pregnant or may be pregnant.  What are the risks?  Generally, this is a safe procedure. However, problems may occur, including:  · Allergic reaction.  · Lung and heart problems.  · Inhaling food or liquid from the stomach into the lungs (aspiration).  · Nerve injury.  · Air in the bloodstream, which can lead to stroke.  · Extreme agitation or confusion (delirium) when you wake up from the anesthetic.  · Waking up during your procedure and being unable to move. This is rare.  These problems are more likely to develop if you are having a major surgery or if you have an advanced or serious medical condition. You can prevent some of these complications by answering all of your health care provider's questions thoroughly and by following all instructions before your procedure.  General anesthesia can cause side effects, including:  · Nausea or vomiting.  · A sore throat from the breathing tube.  · Hoarseness.  · Wheezing or coughing.  · Shaking chills.  · Tiredness.  · Body aches.  · Anxiety.  · Sleepiness or drowsiness.  · Confusion or agitation.  RISKS AND COMPLICATIONS OF SURGERY  Your health care provider will discuss possible risks and complications with you before surgery. Common risks and complications include:    · Problems due to the use of anesthetics.  · Blood loss and replacement (does not apply to minor surgical procedures).  · Temporary increase in pain due to surgery.  · Uncorrected pain or problems that the surgery was meant to correct.  · Infection.  · New damage.    What happens before the procedure?    Medicines  Ask your health care provider about:  · Changing or stopping your regular medicines. This is especially important if you are taking diabetes medicines or blood thinners.  · Taking medicines such as aspirin and ibuprofen. These medicines can thin your blood. Do not take these medicines unless your health care provider tells you to take them.  · Taking over-the-counter medicines, vitamins,  herbs, and supplements. Do not take these during the week before your procedure unless your health care provider approves them.  General instructions  · Starting 3-6 weeks before the procedure, do not use any products that contain nicotine or tobacco, such as cigarettes and e-cigarettes. If you need help quitting, ask your health care provider.  · If you brush your teeth on the morning of the procedure, make sure to spit out all of the toothpaste.  · Tell your health care provider if you become ill or develop a cold, cough, or fever.  · If instructed by your health care provider, bring your sleep apnea device with you on the day of your surgery (if applicable).  · Ask your health care provider if you will be going home the same day, the following day, or after a longer hospital stay.  ? Plan to have someone take you home from the hospital or clinic.  ? Plan to have a responsible adult care for you for at least 24 hours after you leave the hospital or clinic. This is important.  What happens during the procedure?  · You will be given anesthetics through both of the following:  ? A mask placed over your nose and mouth.  ? An IV in one of your veins.  · You may receive a medicine to help you relax (sedative).  · After you are unconscious, a breathing tube may be inserted down your throat to help you breathe. This will be removed before you wake up.  · An anesthesia specialist will stay with you throughout your procedure. He or she will:  ? Keep you comfortable and safe by continuing to give you medicines and adjusting the amount of medicine that you get.  ? Monitor your blood pressure, pulse, and oxygen levels to make sure that the anesthetics do not cause any problems.  The procedure may vary among health care providers and hospitals.  What happens after the procedure?  · Your blood pressure, temperature, heart rate, breathing rate, and blood oxygen level will be monitored until the medicines you were given have worn  off.  · You will wake up in a recovery area. You may wake up slowly.  · If you feel anxious or agitated, you may be given medicine to help you calm down.  · If you will be going home the same day, your health care provider may check to make sure you can walk, drink, and urinate.  · Your health care provider will treat any pain or side effects you have before you go home.  · Do not drive for 24 hours if you were given a sedative.  Summary  · General anesthesia is used to keep you still and prevent pain during a procedure.  · It is important to tell your healthcare provider about your medical history and any surgeries you have had, and previous experience with anesthesia.  · Follow your healthcare provider’s instructions about when to stop eating, drinking, or taking certain medicines before your procedure.  · Plan to have someone take you home from the hospital or clinic.  This information is not intended to replace advice given to you by your health care provider. Make sure you discuss any questions you have with your health care provider.  Document Released: 03/26/2009 Document Revised: 08/03/2018 Document Reviewed: 08/03/2018  [employer] Interactive Patient Education © 2019 [employer]       Fall Prevention in Hospitals, Adult  As a hospital patient, your condition and the treatments you receive can increase your risk for falls. Some additional risk factors for falls in a hospital include:  · Being in an unfamiliar environment.  · Being on bed rest.  · Your surgery.  · Taking certain medicines.  · Your tubing requirements, such as intravenous (IV) therapy or catheters.  It is important that you learn how to decrease fall risks while at the hospital. Below are important tips that can help prevent falls.  SAFETY TIPS FOR PREVENTING FALLS  Talk about your risk of falling.  · Ask your health care provider why you are at risk for falling. Is it your medicine, illness, tubing placement, or something else?  · Make a plan  with your health care provider to keep you safe from falls.  · Ask your health care provider or pharmacist about side effects of your medicines. Some medicines can make you dizzy or affect your coordination.  Ask for help.  · Ask for help before getting out of bed. You may need to press your call button.  · Ask for assistance in getting safely to the toilet.  · Ask for a walker or cane to be put at your bedside. Ask that most of the side rails on your bed be placed up before your health care provider leaves the room.  · Ask family or friends to sit with you.  · Ask for things that are out of your reach, such as your glasses, hearing aids, telephone, bedside table, or call button.  Follow these tips to avoid falling:  · Stay lying or seated, rather than standing, while waiting for help.  · Wear rubber-soled slippers or shoes whenever you walk in the hospital.  · Avoid quick, sudden movements.  ¨ Change positions slowly.  ¨ Sit on the side of your bed before standing.  ¨ Stand up slowly and wait before you start to walk.  · Let your health care provider know if there is a spill on the floor.  · Pay careful attention to the medical equipment, electrical cords, and tubes around you.  · When you need help, use your call button by your bed or in the bathroom. Wait for one of your health care providers to help you.  · If you feel dizzy or unsure of your footing, return to bed and wait for assistance.  · Avoid being distracted by the TV, telephone, or another person in your room.  · Do not lean or support yourself on rolling objects, such as IV poles or bedside tables.     This information is not intended to replace advice given to you by your health care provider. Make sure you discuss any questions you have with your health care provider.     Document Released: 12/15/2001 Document Revised: 01/08/2016 Document Reviewed: 08/25/2013  "[employer]" Interactive Patient Education ©2016 [employer]       [facility]  4% Patient Instruction Sheet    Chlorhexidine Before Surgery  Chlorhexidine gluconate (CHG) is a germ-killing (antiseptic) solution that is used to clean the skin. It gets rid of the bacteria that normally live on the skin. Cleaning your skin with CHG before surgery helps lower the risk for infection after surgery.    How to use CHG solution  · You will take 2 showers, one shower the night before surgery, the second shower the morning of surgery before coming to the hospital.  · Use CHG only as told by your health care provider, and follow the instructions on the label.  · Use CHG solution while taking a shower. Follow these steps when using CHG solution (unless your health care provider gives you different instructions):  1. Start the shower.  2. Use your normal soap and shampoo to wash your face and hair.  3. Turn off the shower or move out of the shower stream.  4. Pour the CHG onto a clean washcloth. Do not use any type of brush or rough-edged sponge.  5. Starting at your neck, lather your body down to your toes. Make sure you:  6. Pay special attention to the part of your body where you will be having surgery. Scrub this area for at least 1 minute.  7. Use the full amount of CHG as directed. Usually, this is one half bottle for each shower.  8. Do not use CHG on your head or face. If the solution gets into your ears or eyes, rinse them well with water.  9. Avoid your genital area.  10. Avoid any areas of skin that have broken skin, cuts, or scrapes.  11. Scrub your back and under your arms. Make sure to wash skin folds.  12. Let the lather sit on your skin for 1-2 minutes or as long as told by your health care  provider.  13. Thoroughly rinse your entire body in the shower. Make sure that all body creases and crevices are rinsed well.  14. Dry off with a clean towel. Do not put any substances on your body afterward, such as powder, lotion, or perfume.  15. Put on clean clothes or pajamas.  16. If it is the  night before your surgery, sleep in clean sheets.    What are the risks?  Risks of using CHG include:  · A skin reaction.  · Hearing loss, if CHG gets in your ears.  · Eye injury, if CHG gets in your eyes and is not rinsed out.  · The CHG product catching fire.  Make sure that you avoid smoking and flames after applying CHG to your skin.  Do not use CHG:  · If you have a chlorhexidine allergy or have previously reacted to chlorhexidine.  · On babies younger than 2 months of age.      On the day of surgery, when you are taken to your room in Outpatient Surgery you will be given a CHG prepackaged cloth to wipe the site for your surgery.  How to use CHG prepackaged cloths  · Follow the instructions on the label.  · Use the CHG cloth on clean, dry skin. Follow these steps when using a CHG cloth (unless your health care provider gives you different instructions):  1. Using the CHG cloth, vigorously scrub the part of your body where you will be having surgery. Scrub using a back-and-forth motion for 3 minutes. The area on your body should be completely wet with CHG when you are finished scrubbing.  2. Do not rinse. Discard the cloth and let the area air-dry for 1 minute. Do not put any substances on your body afterward, such as powder, lotion, or perfume.  Contact a health care provider if:  · Your skin gets irritated after scrubbing.  · You have questions about using your solution or cloth.  Get help right away if:  · Your eyes become very red or swollen.  · Your eyes itch badly.  · Your skin itches badly and is red or swollen.  · Your hearing changes.  · You have trouble seeing.  · You have swelling or tingling in your mouth or throat.  · You have trouble breathing.  · You swallow any chlorhexidine.  Summary  · Chlorhexidine gluconate (CHG) is a germ-killing (antiseptic) solution that is used to clean the skin. Cleaning your skin with CHG before surgery helps lower the risk for infection after surgery.  · You may be  given CHG to use at home. It may be in a bottle or in a prepackaged cloth to use on your skin. Carefully follow your health care provider's instructions and the instructions on the product label.  · Do not use CHG if you have a chlorhexidine allergy.  · Contact your health care provider if your skin gets irritated after scrubbing.  This information is not intended to replace advice given to you by your health care provider. Make sure you discuss any questions you have with your health care provider.  Document Released: 09/11/2013 Document Revised: 11/15/2018 Document Reviewed: 11/15/2018  [employer] Interactive Patient Education © 2019 [employer]

## 2021-07-14 ENCOUNTER — TRANSCRIBE ORDERS (OUTPATIENT)
Dept: LAB | Facility: HOSPITAL | Age: 56
End: 2021-07-14

## 2021-07-14 DIAGNOSIS — Z01.818 PREOPERATIVE TESTING: Primary | ICD-10-CM

## 2021-07-17 ENCOUNTER — LAB (OUTPATIENT)
Dept: LAB | Facility: HOSPITAL | Age: 56
End: 2021-07-17

## 2021-07-17 DIAGNOSIS — Z01.818 PREOPERATIVE TESTING: ICD-10-CM

## 2021-07-17 LAB — SARS-COV-2 ORF1AB RESP QL NAA+PROBE: NOT DETECTED

## 2021-07-17 PROCEDURE — U0004 COV-19 TEST NON-CDC HGH THRU: HCPCS

## 2021-07-17 PROCEDURE — C9803 HOPD COVID-19 SPEC COLLECT: HCPCS

## 2021-07-20 ENCOUNTER — APPOINTMENT (OUTPATIENT)
Dept: GENERAL RADIOLOGY | Facility: HOSPITAL | Age: 56
End: 2021-07-20

## 2021-07-20 ENCOUNTER — ANESTHESIA EVENT (OUTPATIENT)
Dept: PERIOP | Facility: HOSPITAL | Age: 56
End: 2021-07-20

## 2021-07-20 ENCOUNTER — HOSPITAL ENCOUNTER (OUTPATIENT)
Facility: HOSPITAL | Age: 56
Setting detail: HOSPITAL OUTPATIENT SURGERY
Discharge: HOME | End: 2021-07-20
Attending: PODIATRIST | Admitting: PODIATRIST

## 2021-07-20 ENCOUNTER — ANESTHESIA (OUTPATIENT)
Dept: PERIOP | Facility: HOSPITAL | Age: 56
End: 2021-07-20

## 2021-07-20 VITALS
OXYGEN SATURATION: 94 % | TEMPERATURE: 97 F | SYSTOLIC BLOOD PRESSURE: 135 MMHG | HEART RATE: 58 BPM | RESPIRATION RATE: 18 BRPM | DIASTOLIC BLOOD PRESSURE: 91 MMHG

## 2021-07-20 DIAGNOSIS — M25.372 ANKLE INSTABILITY, LEFT: Primary | ICD-10-CM

## 2021-07-20 DIAGNOSIS — M25.572 CHRONIC PAIN OF LEFT ANKLE: ICD-10-CM

## 2021-07-20 DIAGNOSIS — G89.29 CHRONIC PAIN OF LEFT ANKLE: ICD-10-CM

## 2021-07-20 LAB
INR PPP: 1.05 (ref 0.91–1.09)
PROTHROMBIN TIME: 12.9 SECONDS (ref 11.5–13.4)

## 2021-07-20 PROCEDURE — C1889 IMPLANT/INSERT DEVICE, NOC: HCPCS | Performed by: PODIATRIST

## 2021-07-20 PROCEDURE — 25010000002 FENTANYL CITRATE (PF) 100 MCG/2ML SOLUTION: Performed by: NURSE ANESTHETIST, CERTIFIED REGISTERED

## 2021-07-20 PROCEDURE — 25010000002 PHENYLEPHRINE HCL 0.8 MG/10ML SOLUTION PREFILLED SYRINGE: Performed by: NURSE ANESTHETIST, CERTIFIED REGISTERED

## 2021-07-20 PROCEDURE — 25010000002 ROPIVACAINE PER 1 MG: Performed by: ANESTHESIOLOGY

## 2021-07-20 PROCEDURE — 25010000002 PROPOFOL 10 MG/ML EMULSION: Performed by: NURSE ANESTHETIST, CERTIFIED REGISTERED

## 2021-07-20 PROCEDURE — C1713 ANCHOR/SCREW BN/BN,TIS/BN: HCPCS | Performed by: PODIATRIST

## 2021-07-20 PROCEDURE — 25010000002 MIDAZOLAM PER 1 MG: Performed by: ANESTHESIOLOGY

## 2021-07-20 PROCEDURE — 85610 PROTHROMBIN TIME: CPT | Performed by: PODIATRIST

## 2021-07-20 PROCEDURE — 25010000002 DEXAMETHASONE PER 1 MG: Performed by: NURSE ANESTHETIST, CERTIFIED REGISTERED

## 2021-07-20 PROCEDURE — 25010000002 ONDANSETRON PER 1 MG: Performed by: NURSE ANESTHETIST, CERTIFIED REGISTERED

## 2021-07-20 PROCEDURE — 76942 ECHO GUIDE FOR BIOPSY: CPT | Performed by: PODIATRIST

## 2021-07-20 DEVICE — SYS IMP ANKL INTERNALBRACE AUG LIGMT BIOCOMP STD: Type: IMPLANTABLE DEVICE | Site: FOOT | Status: FUNCTIONAL

## 2021-07-20 DEVICE — KT SUT/ANCH FIBERTAK DX W/2 DIA/PT NDL: Type: IMPLANTABLE DEVICE | Site: FOOT | Status: FUNCTIONAL

## 2021-07-20 DEVICE — SUT TAPE W/TPR END 1/2 CIR TPR NDL 1.3MM 36IN: Type: IMPLANTABLE DEVICE | Site: FOOT | Status: FUNCTIONAL

## 2021-07-20 RX ORDER — SODIUM CHLORIDE, SODIUM LACTATE, POTASSIUM CHLORIDE, CALCIUM CHLORIDE 600; 310; 30; 20 MG/100ML; MG/100ML; MG/100ML; MG/100ML
100 INJECTION, SOLUTION INTRAVENOUS CONTINUOUS
Status: DISCONTINUED | OUTPATIENT
Start: 2021-07-20 | End: 2021-07-20 | Stop reason: HOSPADM

## 2021-07-20 RX ORDER — ACETAMINOPHEN 500 MG
1000 TABLET ORAL ONCE
Status: COMPLETED | OUTPATIENT
Start: 2021-07-20 | End: 2021-07-20

## 2021-07-20 RX ORDER — IBUPROFEN 600 MG/1
600 TABLET ORAL ONCE AS NEEDED
Status: DISCONTINUED | OUTPATIENT
Start: 2021-07-20 | End: 2021-07-20 | Stop reason: HOSPADM

## 2021-07-20 RX ORDER — MAGNESIUM HYDROXIDE 1200 MG/15ML
LIQUID ORAL AS NEEDED
Status: DISCONTINUED | OUTPATIENT
Start: 2021-07-20 | End: 2021-07-20 | Stop reason: HOSPADM

## 2021-07-20 RX ORDER — LABETALOL HYDROCHLORIDE 5 MG/ML
5 INJECTION, SOLUTION INTRAVENOUS
Status: DISCONTINUED | OUTPATIENT
Start: 2021-07-20 | End: 2021-07-20 | Stop reason: HOSPADM

## 2021-07-20 RX ORDER — LIDOCAINE HYDROCHLORIDE 10 MG/ML
0.5 INJECTION, SOLUTION EPIDURAL; INFILTRATION; INTRACAUDAL; PERINEURAL ONCE AS NEEDED
Status: DISCONTINUED | OUTPATIENT
Start: 2021-07-20 | End: 2021-07-20 | Stop reason: HOSPADM

## 2021-07-20 RX ORDER — EPHEDRINE SULFATE 50 MG/ML
INJECTION, SOLUTION INTRAVENOUS AS NEEDED
Status: DISCONTINUED | OUTPATIENT
Start: 2021-07-20 | End: 2021-07-20 | Stop reason: SURG

## 2021-07-20 RX ORDER — SODIUM CHLORIDE 0.9 % (FLUSH) 0.9 %
10 SYRINGE (ML) INJECTION EVERY 8 HOURS
Status: DISCONTINUED | OUTPATIENT
Start: 2021-07-20 | End: 2021-07-20 | Stop reason: HOSPADM

## 2021-07-20 RX ORDER — ROPIVACAINE HYDROCHLORIDE 5 MG/ML
INJECTION, SOLUTION EPIDURAL; INFILTRATION; PERINEURAL
Status: COMPLETED | OUTPATIENT
Start: 2021-07-20 | End: 2021-07-20

## 2021-07-20 RX ORDER — SODIUM CHLORIDE 0.9 % (FLUSH) 0.9 %
3 SYRINGE (ML) INJECTION EVERY 12 HOURS SCHEDULED
Status: DISCONTINUED | OUTPATIENT
Start: 2021-07-20 | End: 2021-07-20 | Stop reason: HOSPADM

## 2021-07-20 RX ORDER — CLINDAMYCIN PHOSPHATE 900 MG/50ML
900 INJECTION INTRAVENOUS ONCE
Status: COMPLETED | OUTPATIENT
Start: 2021-07-20 | End: 2021-07-20

## 2021-07-20 RX ORDER — LIDOCAINE HYDROCHLORIDE 20 MG/ML
INJECTION, SOLUTION EPIDURAL; INFILTRATION; INTRACAUDAL; PERINEURAL AS NEEDED
Status: DISCONTINUED | OUTPATIENT
Start: 2021-07-20 | End: 2021-07-20 | Stop reason: SURG

## 2021-07-20 RX ORDER — OXYCODONE AND ACETAMINOPHEN 7.5; 325 MG/1; MG/1
2 TABLET ORAL EVERY 4 HOURS PRN
Status: DISCONTINUED | OUTPATIENT
Start: 2021-07-20 | End: 2021-07-20 | Stop reason: HOSPADM

## 2021-07-20 RX ORDER — PROPOFOL 10 MG/ML
VIAL (ML) INTRAVENOUS AS NEEDED
Status: DISCONTINUED | OUTPATIENT
Start: 2021-07-20 | End: 2021-07-20 | Stop reason: SURG

## 2021-07-20 RX ORDER — PHENYLEPHRINE HCL IN 0.9% NACL 0.8MG/10ML
SYRINGE (ML) INTRAVENOUS AS NEEDED
Status: DISCONTINUED | OUTPATIENT
Start: 2021-07-20 | End: 2021-07-20 | Stop reason: SURG

## 2021-07-20 RX ORDER — SODIUM CHLORIDE 0.9 % (FLUSH) 0.9 %
3-10 SYRINGE (ML) INJECTION AS NEEDED
Status: DISCONTINUED | OUTPATIENT
Start: 2021-07-20 | End: 2021-07-20 | Stop reason: HOSPADM

## 2021-07-20 RX ORDER — FENTANYL CITRATE 50 UG/ML
25 INJECTION, SOLUTION INTRAMUSCULAR; INTRAVENOUS
Status: DISCONTINUED | OUTPATIENT
Start: 2021-07-20 | End: 2021-07-20 | Stop reason: HOSPADM

## 2021-07-20 RX ORDER — ONDANSETRON 2 MG/ML
INJECTION INTRAMUSCULAR; INTRAVENOUS AS NEEDED
Status: DISCONTINUED | OUTPATIENT
Start: 2021-07-20 | End: 2021-07-20 | Stop reason: SURG

## 2021-07-20 RX ORDER — ONDANSETRON 2 MG/ML
4 INJECTION INTRAMUSCULAR; INTRAVENOUS AS NEEDED
Status: DISCONTINUED | OUTPATIENT
Start: 2021-07-20 | End: 2021-07-20 | Stop reason: HOSPADM

## 2021-07-20 RX ORDER — OXYCODONE AND ACETAMINOPHEN 10; 325 MG/1; MG/1
1 TABLET ORAL ONCE AS NEEDED
Status: DISCONTINUED | OUTPATIENT
Start: 2021-07-20 | End: 2021-07-20 | Stop reason: HOSPADM

## 2021-07-20 RX ORDER — DEXAMETHASONE SODIUM PHOSPHATE 4 MG/ML
INJECTION, SOLUTION INTRA-ARTICULAR; INTRALESIONAL; INTRAMUSCULAR; INTRAVENOUS; SOFT TISSUE AS NEEDED
Status: DISCONTINUED | OUTPATIENT
Start: 2021-07-20 | End: 2021-07-20 | Stop reason: SURG

## 2021-07-20 RX ORDER — FENTANYL CITRATE 50 UG/ML
INJECTION, SOLUTION INTRAMUSCULAR; INTRAVENOUS AS NEEDED
Status: DISCONTINUED | OUTPATIENT
Start: 2021-07-20 | End: 2021-07-20 | Stop reason: SURG

## 2021-07-20 RX ORDER — MIDAZOLAM HYDROCHLORIDE 1 MG/ML
1 INJECTION INTRAMUSCULAR; INTRAVENOUS
Status: COMPLETED | OUTPATIENT
Start: 2021-07-20 | End: 2021-07-20

## 2021-07-20 RX ORDER — DOCUSATE SODIUM 100 MG/1
100 CAPSULE, LIQUID FILLED ORAL 2 TIMES DAILY
Qty: 60 CAPSULE | Refills: 0 | Status: SHIPPED | OUTPATIENT
Start: 2021-07-20

## 2021-07-20 RX ORDER — FLUMAZENIL 0.1 MG/ML
0.2 INJECTION INTRAVENOUS AS NEEDED
Status: DISCONTINUED | OUTPATIENT
Start: 2021-07-20 | End: 2021-07-20 | Stop reason: HOSPADM

## 2021-07-20 RX ORDER — OXYCODONE AND ACETAMINOPHEN 10; 325 MG/1; MG/1
1 TABLET ORAL EVERY 4 HOURS PRN
Qty: 42 TABLET | Refills: 0 | Status: SHIPPED | OUTPATIENT
Start: 2021-07-20

## 2021-07-20 RX ORDER — NALOXONE HCL 0.4 MG/ML
0.04 VIAL (ML) INJECTION AS NEEDED
Status: DISCONTINUED | OUTPATIENT
Start: 2021-07-20 | End: 2021-07-20 | Stop reason: HOSPADM

## 2021-07-20 RX ORDER — PROMETHAZINE HYDROCHLORIDE 12.5 MG/1
12.5 TABLET ORAL EVERY 4 HOURS PRN
Qty: 42 TABLET | Refills: 0 | Status: SHIPPED | OUTPATIENT
Start: 2021-07-20

## 2021-07-20 RX ADMIN — ROPIVACAINE HYDROCHLORIDE 20 ML: 5 INJECTION, SOLUTION EPIDURAL; INFILTRATION; PERINEURAL at 07:00

## 2021-07-20 RX ADMIN — Medication 100 MCG: at 08:21

## 2021-07-20 RX ADMIN — PROPOFOL 200 MG: 10 INJECTION, EMULSION INTRAVENOUS at 07:12

## 2021-07-20 RX ADMIN — EPHEDRINE SULFATE 10 MG: 50 INJECTION INTRAVENOUS at 08:16

## 2021-07-20 RX ADMIN — ONDANSETRON 4 MG: 2 INJECTION INTRAMUSCULAR; INTRAVENOUS at 08:35

## 2021-07-20 RX ADMIN — MIDAZOLAM 1 MG: 1 INJECTION INTRAMUSCULAR; INTRAVENOUS at 06:44

## 2021-07-20 RX ADMIN — MIDAZOLAM 1 MG: 1 INJECTION INTRAMUSCULAR; INTRAVENOUS at 07:01

## 2021-07-20 RX ADMIN — CLINDAMYCIN PHOSPHATE 900 MG: 900 INJECTION, SOLUTION INTRAVENOUS at 07:05

## 2021-07-20 RX ADMIN — SODIUM CHLORIDE, POTASSIUM CHLORIDE, SODIUM LACTATE AND CALCIUM CHLORIDE: 600; 310; 30; 20 INJECTION, SOLUTION INTRAVENOUS at 08:27

## 2021-07-20 RX ADMIN — EPHEDRINE SULFATE 15 MG: 50 INJECTION INTRAVENOUS at 08:01

## 2021-07-20 RX ADMIN — ACETAMINOPHEN 1000 MG: 500 TABLET, FILM COATED ORAL at 06:43

## 2021-07-20 RX ADMIN — FENTANYL CITRATE 100 MCG: 50 INJECTION, SOLUTION INTRAMUSCULAR; INTRAVENOUS at 07:12

## 2021-07-20 RX ADMIN — ROPIVACAINE HYDROCHLORIDE 20 ML: 5 INJECTION, SOLUTION EPIDURAL; INFILTRATION; PERINEURAL at 07:02

## 2021-07-20 RX ADMIN — DEXAMETHASONE SODIUM PHOSPHATE 4 MG: 4 INJECTION, SOLUTION INTRA-ARTICULAR; INTRALESIONAL; INTRAMUSCULAR; INTRAVENOUS; SOFT TISSUE at 07:30

## 2021-07-20 RX ADMIN — SODIUM CHLORIDE, POTASSIUM CHLORIDE, SODIUM LACTATE AND CALCIUM CHLORIDE 100 ML/HR: 600; 310; 30; 20 INJECTION, SOLUTION INTRAVENOUS at 06:08

## 2021-07-20 RX ADMIN — LIDOCAINE HYDROCHLORIDE 100 MG: 20 INJECTION, SOLUTION EPIDURAL; INFILTRATION; INTRACAUDAL; PERINEURAL at 07:12

## 2021-07-20 NOTE — OP NOTE
ANKLE OPEN REDUCTION INTERNAL FIXATION, ANKLE LIGAMENT RECONSTRUCTION  Procedure Note    Ismael Huff  7/20/2021    Pre-op Diagnosis:   INSTABILITY OF LEFT ANKLE, SINUS TARSI SYNDROME, FOREIGN BODY LEFT ANKLE    Post-op Diagnosis:     Post-Op Diagnosis Codes:     * Ankle instability, left [M25.372]     * Ankle pain, left [M25.572]     * Foreign body of left ankle [S90.552A]    Procedure/CPT® Codes:      Procedure(s):  1)  LATERAL ANKLE STABILIZATION AND REPAIR ANTERIOR TALOFIBULAR LIGAMENT AND CALCANEOFIBULAR LIGAMENT WITH REMOVAL OF FOREIGN BODY SINUS TARSI - LEFT ANKLE      Surgeon(s):  William Frazier DPM    Anesthesia: General with Block    Staff:   Circulator: Елена Alvarez RN; Roderick Trinidad RN  Scrub Person: Demetrius Zee; Suyapa Blackman     was responsible for performing the following activities: Retraction and their skilled assistance was necessary for the success of this case.    Indications for procedure:  pain    Procedure details:  The patient brought the operating placed under general anesthesia.  Patient did have a preoperative regional block per anesthesia preop holding area.  Left leg prepped and draped in usual sterile fashion.  Tourniquet was not used due to his history of DVT.    Procedure #1 lateral ankle stabilization with.  The anterior talofibular calcaneofibular ligament with removal foreign body from the sinus tarsi.    This was then direct lateral ankle.  Curvilinear incision was placed over previous cicatrix.  It was deepened lysing sharp blunt dissection technique.  A linear incision created over the lateral ankle capsule including the anterior talofibular down to the calcaneofibular ligament.  Significant dissection was carried to the lateral aspect of the neck of the talus to find the previous soft tissue anchor.  Much of this was loose in the soft tissue.  It was removed utilizing pituitary rongeur.  Wound was then irrigated.  A drill hole was created in the  appropriate position in the anterolateral body of the talus.  I secondary holes created the fibula.  These were tapped.  A swivel lock was placed into the talus.  Suture material was passed through the origin of the anterior talofibular ligament.  The drill hole in the fibula was tapped and then marked with a K wire.  A second all suture anchor was placed the tip of the fibula at the insertion the calcaneal fibular ligament.  This was then advanced.  The lateral ankle capsular tissues as well as the anterior talofibular calcaneofibular ligament ligaments were then repaired in a pants over vest type fashion.  The suture material from the swivel lock in the talus was then placed over the repair under physiologic tension into the fibula.  Wound was then irrigated.  Closure performed in layers.  Well-padded compression bandage with posterior splint was applied.        Estimated Blood Loss: none    Specimens:                None      Drains: None    Implants:   Implant Name Type Inv. Item Serial No.  Lot No. LRB No. Used Action   SYS IMP ANKL INTERNALBRACE AUG LIGMT BIOCOMP STD - [id number] Implant SYS IMP ANKL INTERNALBRACE AUG LIGMT BIOCOMP STD  [id number] Left 1 Implanted   SUT TAPE W/TPR END 1/2 CIR TPR NDL 1.3MM 36IN - [id number] Implant SUT TAPE W/TPR END 1/2 CIR TPR NDL 1.3MM 36IN  [employer] [id number] Left 2 Implanted   KT SUT/ANCH FIBERTAK DX W/2 BRETT/PT NDL - [id number] Implant KT SUT/ANCH FIBERTAK DX W/2 BRETT/PT NDL  [employer] [id number] Left 1 Implanted        Complications: none    Follow up:   Nonweightbearing.  3 to 5 days for follow-up.  Prescriptions for Percocet and Phenergan were given.  Patient needs to resume his Coumadin today.  We will also use Lovenox for the next 2 to 4 days.    William Frazier DPM     Date: 7/20/2021  Time: 08:57 CDT

## 2021-07-20 NOTE — ANESTHESIA POSTPROCEDURE EVALUATION
Patient: Ismael Huff    Procedure Summary     Date: 07/20/21 Room / Location:  [facility] OR [address] [place] [address] [facility]    Anesthesia Start: 0709 Anesthesia Stop: 0906    Procedures:       LATERAL ANKLE STABILIZATION AND REPAIR ANTERIOR TALOFIBULAR LIGAMENT AND CALCANEOFIBULAR LIGAMENT WITH REMOVAL OF FOREIGN BODY SINUS TARSI - LEFT ANKLE (Left Ankle)      REPAIR ANTERIOR TALOFIBULAR LIGAMENT AND CALCANEOFIBULAR LIGAMENT (Left Ankle) Diagnosis:       Ankle instability, left      Ankle pain, left      Foreign body of left ankle      (INSTABILITY OF LEFT ANKLE, SINUS TARSI SYNDROME, FOREIGN BODY LEFT ANKLE)    Surgeons: William Frazier DPM Provider: Ngozi Akins CRNA    Anesthesia Type: general ASA Status: 2          Anesthesia Type: general    Vitals  Vitals Value Taken Time   /90 07/20/21 0921   Temp 97 °F (36.1 °C) 07/20/21 0915   Pulse 65 07/20/21 0921   Resp 16 07/20/21 0915   SpO2 93 % 07/20/21 0921   Vitals shown include unvalidated device data.        Post Anesthesia Care and Evaluation    Patient location during evaluation: PACU  Patient participation: complete - patient participated  Level of consciousness: awake and alert  Pain management: adequate  Airway patency: patent  Anesthetic complications: No anesthetic complications    Cardiovascular status: acceptable  Respiratory status: acceptable  Hydration status: acceptable    Comments: Blood pressure 131/96, pulse 58, temperature 97 °F (36.1 °C), temperature source Temporal, resp. rate 18, SpO2 94 %.    Pt discharged from PACU based on bjorn score >8  No anesthesia care post op

## 2021-07-20 NOTE — ANESTHESIA PROCEDURE NOTES
Airway  Urgency: elective    Date/Time: 7/20/2021 7:15 AM  Airway not difficult    General Information and Staff    Patient location during procedure: OR  CRNA: Ngozi Akins CRNA    Indications and Patient Condition  Indications for airway management: airway protection    Preoxygenated: yes  Mask difficulty assessment: 1 - vent by mask    Final Airway Details  Final airway type: supraglottic airway      Successful airway: unique and I-gel  Size 5    Number of attempts at approach: 1  Assessment: lips, teeth, and gum same as pre-op and atraumatic intubation    Additional Comments  LMA inserted by dalia Price

## 2021-07-20 NOTE — ANESTHESIA PROCEDURE NOTES
Peripheral Block    Pre-sedation assessment completed: 7/20/2021 6:58 AM    Patient reassessed immediately prior to procedure    Patient location during procedure: pre-op  Start time: 7/20/2021 6:59 AM  Stop time: 7/20/2021 7:01 AM  Reason for block: procedure for pain, at surgeon's request and post-op pain management  Performed by  Anesthesiologist: Jerri Juarez MD  Preanesthetic Checklist  Completed: patient identified, IV checked, site marked, risks and benefits discussed, surgical consent, monitors and equipment checked, pre-op evaluation and timeout performed  Prep:  Pt Position: supine  Sterile barriers:alcohol skin prep, cap, gloves, mask and washed/disinfected hands  Prep: ChloraPrep  Patient monitoring: blood pressure monitoring, continuous pulse oximetry and EKG  Procedure  Sedation:yes    Guidance:ultrasound guided and Sciatic nerve identified and local anesthetic seen surrounding nerve  ULTRASOUND INTERPRETATION. Using ultrasound guidance a 20 G gauge needle was placed in close proximity to the nerve, at which point, under ultrasound guidance anesthetic was injected in the area of the nerve and spread of the anesthesia was seen on ultrasound in close proximity thereto.  There were no abnormalities seen on ultrasound; a digital image was taken; and the patient tolerated the procedure with no complications. Images:still images obtained (picture printed and placed in patients chart)    Laterality:left  Block Type:sciatic and popliteal  Injection Technique:single-shot  Needle Type:echogenic      Medications Used: ropivacaine (NAROPIN) injection 0.5 %, 20 mL  Med admintered at 7/20/2021 7:00 AM      Post Assessment  Injection Assessment: negative aspiration for heme, no paresthesia on injection and incremental injection  Patient Tolerance:comfortable throughout block  Complications:no

## 2021-07-20 NOTE — ANESTHESIA PREPROCEDURE EVALUATION
Anesthesia Evaluation     Patient summary reviewed and Nursing notes reviewed   no history of anesthetic complications:  NPO Solid Status: > 8 hours  NPO Liquid Status: > 8 hours           Airway   Mallampati: II  TM distance: >3 FB  Neck ROM: full  No difficulty expected  Dental      Pulmonary    (-) sleep apnea, not a smoker  Cardiovascular   Exercise tolerance: good (4-7 METS)    PT is on anticoagulation therapy    (+) DVT (feb 2021) resolved, hyperlipidemia,   (-) CAD, cardiac stents    ROS comment: lovenox 40 mg qhs as bridging, last night 12 hours ago    Neuro/Psych  (-) seizures, TIA, CVA  GI/Hepatic/Renal/Endo    (+) obesity,   thyroid problem hypothyroidism  (-) liver disease, no renal disease, diabetes    Musculoskeletal     Abdominal    Substance History      OB/GYN          Other   arthritis,                      Anesthesia Plan    ASA 2     general     intravenous induction     Anesthetic plan, all risks, benefits, and alternatives have been provided, discussed and informed consent has been obtained with: patient.

## 2021-07-20 NOTE — ANESTHESIA PROCEDURE NOTES
Peripheral Block    Pre-sedation assessment completed: 7/20/2021 6:58 AM    Patient reassessed immediately prior to procedure    Patient location during procedure: pre-op  Start time: 7/20/2021 7:02 AM  Stop time: 7/20/2021 7:03 AM  Reason for block: procedure for pain, at surgeon's request and post-op pain management  Performed by  Anesthesiologist: Jerri Juarez MD  Preanesthetic Checklist  Completed: patient identified, IV checked, site marked, risks and benefits discussed, surgical consent, monitors and equipment checked, pre-op evaluation and timeout performed  Prep:  Pt Position: supine  Sterile barriers:alcohol skin prep, washed/disinfected hands, gloves and mask  Prep: ChloraPrep  Patient monitoring: blood pressure monitoring, continuous pulse oximetry and EKG  Procedure  Sedation:yes    Guidance:ultrasound guided and femoral artery identified in adductor canal and local anesthetic seen surrounding artery  ULTRASOUND INTERPRETATION. Using ultrasound guidance a 20 G gauge needle was placed in close proximity to the nerve, at which point, under ultrasound guidance anesthetic was injected in the area of the nerve and spread of the anesthesia was seen on ultrasound in close proximity thereto.  There were no abnormalities seen on ultrasound; a digital image was taken; and the patient tolerated the procedure with no complications. Images:still images obtained (picture printed and placed in patients chart)    Laterality:left  Block Type:adductor canal block  Injection Technique:single-shot  Needle Type:echogenic      Medications Used: ropivacaine (NAROPIN) injection 0.5 %, 20 mL  Med admintered at 7/20/2021 7:02 AM      Post Assessment  Injection Assessment: negative aspiration for heme, no paresthesia on injection and incremental injection  Patient Tolerance:comfortable throughout block  Complications:no

## 2021-07-20 NOTE — DISCHARGE INSTRUCTIONS
YOUR NEXT PAIN MEDICATION IS DUE AT______________         General Anesthesia, Adult, Care After  This sheet gives you information about how to care for yourself after your procedure. Your health care provider may also give you more specific instructions. If you have problems or questions, contact your health care provider.  What can I expect after the procedure?  After the procedure, the following side effects are common:  · Pain or discomfort at the IV site.  · Nausea.  · Vomiting.  · Sore throat.  · Trouble concentrating.  · Feeling cold or chills.  · Weak or tired.  · Sleepiness and fatigue.  · Soreness and body aches. These side effects can affect parts of the body that were not involved in surgery.  Follow these instructions at home:   For at least 24 hours after the procedure:  1. Have a responsible adult stay with you. It is important to have someone help care for you until you are awake and alert.  2. Rest as needed.  3. Do not:  ? Participate in activities in which you could fall or become injured.  ? Drive.  ? Use heavy machinery.  ? Drink alcohol.  ? Take sleeping pills or medicines that cause drowsiness.  ? Make important decisions or sign legal documents.  ? Take care of children on your own.  Eating and drinking  · Follow any instructions from your health care provider about eating or drinking restrictions.  · When you feel hungry, start by eating small amounts of foods that are soft and easy to digest (bland), such as toast. Gradually return to your regular diet.  · Drink enough fluid to keep your urine pale yellow.  · If you vomit, rehydrate by drinking water, juice, or clear broth.  General instructions  1. If you have sleep apnea, surgery and certain medicines can increase your risk for breathing problems. Follow instructions from your health care provider about wearing your sleep device:  ? Anytime you are sleeping, including during daytime naps.  ? While taking prescription pain medicines,  sleeping medicines, or medicines that make you drowsy.  2. Return to your normal activities as told by your health care provider. Ask your health care provider what activities are safe for you.  3. Take over-the-counter and prescription medicines only as told by your health care provider.  4. If you smoke, do not smoke without supervision.  5. Keep all follow-up visits as told by your health care provider. This is important.  Contact a health care provider if:  · You have nausea or vomiting that does not get better with medicine.  · You cannot eat or drink without vomiting.  · You have pain that does not get better with medicine.  · You are unable to pass urine.  · You develop a skin rash.  · You have a fever.  · You have redness around your IV site that gets worse.  Get help right away if:  · You have difficulty breathing.  · You have chest pain.  · You have blood in your urine or stool, or you vomit blood.  Summary  · After the procedure, it is common to have a sore throat or nausea. It is also common to feel tired.  · Have a responsible adult stay with you for the first 24 hours after general anesthesia. It is important to have someone help care for you until you are awake and alert.  · When you feel hungry, start by eating small amounts of foods that are soft and easy to digest (bland), such as toast. Gradually return to your regular diet.  · Drink enough fluid to keep your urine pale yellow.  · Return to your normal activities as told by your health care provider. Ask your health care provider what activities are safe for you.  This information is not intended to replace advice given to you by your health care provider. Make sure you discuss any questions you have with your health care provider.  Document Revised: 12/21/2018 Document Reviewed: 08/03/2018    CALL YOUR PHYSICIAN IF YOU EXPERIENCE  INCREASED PAIN NOT HELPED BY YOUR PAIN MEDICATION.      .                                              Fall  Prevention in the Home      Falls can cause injuries. They can happen to people of all ages. There are many things you can do to make your home safe and to help prevent falls.    WHAT CAN I DO ON THE OUTSIDE OF MY HOME?  · Regularly fix the edges of walkways and driveways and fix any cracks.  · Remove anything that might make you trip as you walk through a door, such as a raised step or threshold.  · Trim any bushes or trees on the path to your home.  · Use bright outdoor lighting.  · Clear any walking paths of anything that might make someone trip, such as rocks or tools.  · Regularly check to see if handrails are loose or broken. Make sure that both sides of any steps have handrails.  · Any raised decks and porches should have guardrails on the edges.  · Have any leaves, snow, or ice cleared regularly.  · Use sand or salt on walking paths during winter.  · Clean up any spills in your garage right away. This includes oil or grease spills.  WHAT CAN I DO IN THE BATHROOM?    · Use night lights.  · Install grab bars by the toilet and in the tub and shower. Do not use towel bars as grab bars.  · Use non-skid mats or decals in the tub or shower.  · If you need to sit down in the shower, use a plastic, non-slip stool.  · Keep the floor dry. Clean up any water that spills on the floor as soon as it happens.  · Remove soap buildup in the tub or shower regularly.  · Attach bath mats securely with double-sided non-slip rug tape.  · Do not have throw rugs and other things on the floor that can make you trip.  WHAT CAN I DO IN THE BEDROOM?  · Use night lights.  · Make sure that you have a light by your bed that is easy to reach.  · Do not use any sheets or blankets that are too big for your bed. They should not hang down onto the floor.  · Have a firm chair that has side arms. You can use this for support while you get dressed.  · Do not have throw rugs and other things on the floor that can make you trip.  WHAT CAN I DO IN  THE KITCHEN?  · Clean up any spills right away.  · Avoid walking on wet floors.  · Keep items that you use a lot in easy-to-reach places.  · If you need to reach something above you, use a strong step stool that has a grab bar.  · Keep electrical cords out of the way.  · Do not use floor polish or wax that makes floors slippery. If you must use wax, use non-skid floor wax.  · Do not have throw rugs and other things on the floor that can make you trip.  WHAT CAN I DO WITH MY STAIRS?  · Do not leave any items on the stairs.  · Make sure that there are handrails on both sides of the stairs and use them. Fix handrails that are broken or loose. Make sure that handrails are as long as the stairways.  · Check any carpeting to make sure that it is firmly attached to the stairs. Fix any carpet that is loose or worn.  · Avoid having throw rugs at the top or bottom of the stairs. If you do have throw rugs, attach them to the floor with carpet tape.  · Make sure that you have a light switch at the top of the stairs and the bottom of the stairs. If you do not have them, ask someone to add them for you.  WHAT ELSE CAN I DO TO HELP PREVENT FALLS?  · Wear shoes that:  ¨ Do not have high heels.  ¨ Have rubber bottoms.  ¨ Are comfortable and fit you well.  ¨ Are closed at the toe. Do not wear sandals.  · If you use a stepladder:  ¨ Make sure that it is fully opened. Do not climb a closed stepladder.  ¨ Make sure that both sides of the stepladder are locked into place.  ¨ Ask someone to hold it for you, if possible.  · Clearly codie and make sure that you can see:  ¨ Any grab bars or handrails.  ¨ First and last steps.  ¨ Where the edge of each step is.  · Use tools that help you move around (mobility aids) if they are needed. These include:  ¨ Canes.  ¨ Walkers.  ¨ Scooters.  ¨ Crutches.  · Turn on the lights when you go into a dark area. Replace any light bulbs as soon as they burn out.  · Set up your furniture so you have a clear  path. Avoid moving your furniture around.  · If any of your floors are uneven, fix them.  · If there are any pets around you, be aware of where they are.  · Review your medicines with your doctor. Some medicines can make you feel dizzy. This can increase your chance of falling.  Ask your doctor what other things that you can do to help prevent falls.     This information is not intended to replace advice given to you by your health care provider. Make sure you discuss any questions you have with your health care provider.     Document Released: 10/14/2010 Document Revised: 05/03/2016 Document Reviewed: 01/22/2016  [employer] Interactive Patient Education ©2016 [employer]     PATIENT/FAMILY/RESPONSIBLE PARTY VERBALIZES UNDERSTANDING OF ABOVE EDUCATION.  COPY OF PAIN SCALE GIVEN AND REVIEWED WITH VERBALIZED UNDERSTANDING.    What to expect after a Nerve Block  Nerve blocks administered to block pain affect many types of nerves, including those nerves that control movement, pain, and normal sensation.  Following a nerve block, you may notice some bruising at the site where the block was given.  You may experience sensations such as:  numbness of the affected area or limb, tingling, heaviness (that is the limb feels heavy to you), weakness or inability to move the affected arm or leg, or a feeling as if your arm or leg has “fallen asleep”.    A nerve block can last from 9-18 hours depending on the medications used.  Usually the weakness wears off first followed by the tingling and heaviness.  As the block wears off, you may begin to notice pain; however, this sequence of events may occur in any order.  Typically, you will be able to move your limb before you will feel it.  Once a nerve block begins to wear off, the effects are usually completely gone within 60 minutes.    If you experience continued side effects that you believe are block related for longer than 48 hours, please call your healthcare provider.  Please  see block-specific instructions below.    Instructions for any Block involving the leg/foot:  If you have had a leg/foot block, you should not bear weight on the affected leg until the block has worn off.  After the block has worn off, weight bearing should be as directed by your surgeon.  You may be sent home with crutches.  You are at high risk for falling because of the anesthetic effects on your leg.  Please use caution when standing or trying to move or walk.  Have someone assist you until your leg and foot function have returned to normal.    Protection of a “blocked” limb  • After a nerve block, you cannot feel pain, pressure, or extremes of temperature in the affected limb.  And because of this, your blocked limb is at more risk for injury.  For example, it is possible to burn your limb on an extremely hot surface without feeling it.  • When resting, it is important to reposition your limb periodically to avoid prolonged pressure on it.  This may require the use of pillows and padding.  • While sleeping, you should avoid rolling onto the affected limb or putting too much pressure on it.  • If you have a cast or tight dressing, check the color of your toes of the affected limb.  Call your surgeon if they look discolored (that is, dusky, dark colored)  • Use caution in cold weather.  Cover your limb appropriately to protect it from the cold.  Pain Management  Your surgeon will give you a prescription for pain medication.  Begin taking this before the nerve block wears off.  Bear in mind that sometimes the block can wear off in the middle of the night.    PATIENT/FAMILY/RESPONSIBLE PARTY VERBALIZES UNDERSTANDING OF ABOVE EDUCATION.  COPY OF PAIN SCALE GIVEN AND REVIEWED WITH VERBALIZED UNDERSTANDING.

## 2021-12-29 DIAGNOSIS — Z11.59 SCREENING FOR VIRAL DISEASE: Primary | ICD-10-CM

## 2022-01-21 ENCOUNTER — TELEPHONE (OUTPATIENT)
Dept: GASTROENTEROLOGY | Age: 57
End: 2022-01-21

## 2022-01-21 NOTE — TELEPHONE ENCOUNTER
Patient: Edward Ennis    YOB: 1965      Clearance was received on January 21, 2022. for Endoscopy / Colonoscopy scheduled for:     Patient may discontinue the use of coumadin for 5  days prior to the procedure. IS Lovenox required:  yes    PATIENT NOTIFIED ON:  Spoke to patient 1/20/22 - He has  All instructions       We have not been able to receive the clearance by fax as their have been problems with the providers fax machine.   But they verbally confirmed the clearance and they have spoken to the patient and have given him instruction & prescription for the lovenox

## 2022-01-26 DIAGNOSIS — Z11.59 SCREENING FOR VIRAL DISEASE: ICD-10-CM

## 2022-01-26 LAB — SARS-COV-2, PCR: NOT DETECTED

## 2022-01-27 ENCOUNTER — ANESTHESIA (OUTPATIENT)
Dept: OPERATING ROOM | Age: 57
End: 2022-01-27

## 2022-01-27 ENCOUNTER — APPOINTMENT (OUTPATIENT)
Dept: OPERATING ROOM | Age: 57
End: 2022-01-27

## 2022-01-27 ENCOUNTER — ANESTHESIA EVENT (OUTPATIENT)
Dept: OPERATING ROOM | Age: 57
End: 2022-01-27

## 2022-01-27 ENCOUNTER — HOSPITAL ENCOUNTER (OUTPATIENT)
Age: 57
Setting detail: OUTPATIENT SURGERY
Discharge: HOME | End: 2022-01-27
Attending: INTERNAL MEDICINE | Admitting: INTERNAL MEDICINE
Payer: MEDICAID

## 2022-01-27 ENCOUNTER — HOSPITAL ENCOUNTER (OUTPATIENT)
Age: 57
Setting detail: SPECIMEN
Discharge: HOME | End: 2022-01-27
Payer: MEDICAID

## 2022-01-27 VITALS
RESPIRATION RATE: 16 BRPM | OXYGEN SATURATION: 92 % | DIASTOLIC BLOOD PRESSURE: 75 MMHG | SYSTOLIC BLOOD PRESSURE: 106 MMHG

## 2022-01-27 VITALS
HEART RATE: 78 BPM | OXYGEN SATURATION: 95 % | WEIGHT: 235 LBS | SYSTOLIC BLOOD PRESSURE: 101 MMHG | HEIGHT: 72 IN | RESPIRATION RATE: 18 BRPM | TEMPERATURE: 97.6 F | DIASTOLIC BLOOD PRESSURE: 77 MMHG | BODY MASS INDEX: 31.83 KG/M2

## 2022-01-27 PROCEDURE — G8907 PT DOC NO EVENTS ON DISCHARG: HCPCS

## 2022-01-27 PROCEDURE — 45380 COLONOSCOPY AND BIOPSY: CPT

## 2022-01-27 PROCEDURE — 45385 COLONOSCOPY W/LESION REMOVAL: CPT

## 2022-01-27 PROCEDURE — 45380 COLONOSCOPY AND BIOPSY: CPT | Performed by: INTERNAL MEDICINE

## 2022-01-27 PROCEDURE — G8918 PT W/O PREOP ORDER IV AB PRO: HCPCS

## 2022-01-27 PROCEDURE — 88305 TISSUE EXAM BY PATHOLOGIST: CPT

## 2022-01-27 PROCEDURE — 45385 COLONOSCOPY W/LESION REMOVAL: CPT | Performed by: INTERNAL MEDICINE

## 2022-01-27 RX ORDER — LABETALOL HYDROCHLORIDE 5 MG/ML
5 INJECTION, SOLUTION INTRAVENOUS EVERY 10 MIN PRN
Status: DISCONTINUED | OUTPATIENT
Start: 2022-01-27 | End: 2022-01-27 | Stop reason: HOSPADM

## 2022-01-27 RX ORDER — DIPHENHYDRAMINE HYDROCHLORIDE 50 MG/ML
12.5 INJECTION INTRAMUSCULAR; INTRAVENOUS
Status: DISCONTINUED | OUTPATIENT
Start: 2022-01-27 | End: 2022-01-27 | Stop reason: HOSPADM

## 2022-01-27 RX ORDER — PROPOFOL 10 MG/ML
INJECTION, EMULSION INTRAVENOUS PRN
Status: DISCONTINUED | OUTPATIENT
Start: 2022-01-27 | End: 2022-01-27 | Stop reason: SDUPTHER

## 2022-01-27 RX ORDER — SODIUM CHLORIDE 9 MG/ML
INJECTION, SOLUTION INTRAVENOUS CONTINUOUS
Status: DISCONTINUED | OUTPATIENT
Start: 2022-01-27 | End: 2022-01-27 | Stop reason: HOSPADM

## 2022-01-27 RX ORDER — ONDANSETRON 2 MG/ML
4 INJECTION INTRAMUSCULAR; INTRAVENOUS
Status: DISCONTINUED | OUTPATIENT
Start: 2022-01-27 | End: 2022-01-27 | Stop reason: HOSPADM

## 2022-01-27 RX ORDER — HYDRALAZINE HYDROCHLORIDE 20 MG/ML
5 INJECTION INTRAMUSCULAR; INTRAVENOUS EVERY 10 MIN PRN
Status: DISCONTINUED | OUTPATIENT
Start: 2022-01-27 | End: 2022-01-27 | Stop reason: HOSPADM

## 2022-01-27 RX ORDER — PROMETHAZINE HYDROCHLORIDE 25 MG/ML
6.25 INJECTION, SOLUTION INTRAMUSCULAR; INTRAVENOUS
Status: DISCONTINUED | OUTPATIENT
Start: 2022-01-27 | End: 2022-01-27 | Stop reason: HOSPADM

## 2022-01-27 RX ORDER — LIDOCAINE HYDROCHLORIDE 10 MG/ML
INJECTION, SOLUTION EPIDURAL; INFILTRATION; INTRACAUDAL; PERINEURAL PRN
Status: DISCONTINUED | OUTPATIENT
Start: 2022-01-27 | End: 2022-01-27 | Stop reason: SDUPTHER

## 2022-01-27 RX ORDER — 0.9 % SODIUM CHLORIDE 0.9 %
500 INTRAVENOUS SOLUTION INTRAVENOUS
Status: DISCONTINUED | OUTPATIENT
Start: 2022-01-27 | End: 2022-01-27 | Stop reason: HOSPADM

## 2022-01-27 RX ADMIN — LIDOCAINE HYDROCHLORIDE 30 MG: 10 INJECTION, SOLUTION EPIDURAL; INFILTRATION; INTRACAUDAL; PERINEURAL at 09:31

## 2022-01-27 RX ADMIN — SODIUM CHLORIDE: 9 INJECTION, SOLUTION INTRAVENOUS at 09:25

## 2022-01-27 RX ADMIN — PROPOFOL 180 MG: 10 INJECTION, EMULSION INTRAVENOUS at 09:31

## 2022-01-27 ASSESSMENT — LIFESTYLE VARIABLES: SMOKING_STATUS: 0

## 2022-01-27 ASSESSMENT — ENCOUNTER SYMPTOMS: SHORTNESS OF BREATH: 0

## 2022-01-27 NOTE — H&P
Patient Name: Patsy Tafoya  : 1965  MRN: [id number]  DATE: 22    Allergies: Allergies   Allergen Reactions    Reglan [Metoclopramide Hcl] Shortness Of Breath     \"cuts my breathing off\"    Allopurinol Rash    Cephalexin Rash     Keflex causes a rash        ENDOSCOPY  History and Physical    Procedure:    [] Diagnostic Colonoscopy       [x] Screening Colonoscopy  [] EGD      [] ERCP      [] EUS       [] Other    [x] Previous office notes/History and Physical reviewed from the patients chart. Please see EMR for further details of HPI. I have examined the patient's status immediately prior to the procedure and:      Indications/HPI:       [x] Screening              [x] History of Polyps      []Fhx of colon CA/polyps       Anesthesia:   [x] MAC [] Moderate Sedation   [] General   [] None     ROS: 12 pt review of systems was negative unless stated above    Medications:   Prior to Admission medications    Medication Sig Start Date End Date Taking? Authorizing Provider   enoxaparin (LOVENOX) 40 MG/0.4ML injection Inject 40 mg into the skin   Yes Historical Provider, MD   levothyroxine (SYNTHROID) 25 MCG tablet Take 25 mcg by mouth Daily Patient uncertain of dose   Yes Historical Provider, MD   Cholecalciferol (VITAMIN D3) 1.25 MG (19863 UT) CAPS Take 1 each by mouth every 7 days    Historical Provider, MD   albuterol sulfate  (90 Base) MCG/ACT inhaler 2 puffs every 6 hours as needed for Wheezing or Shortness of Breath  3/15/21   Historical Provider, MD   warfarin (COUMADIN) 5 MG tablet Take 1 tablet by mouth daily 21   Maude Covington PA-C       Past Medical History:  Past Medical History:   Diagnosis Date    Bleeds easily [facility])     ever since colon surgery    Headache(784.0)     Hx of blood clots     Hyperlipidemia     mild; pcp never called in meds/no meds.      Kidney stone     hx of    S/P cardiac cath 2011    Thyroid disease     Ventral hernia        Past Surgical History:  Past Surgical History:   Procedure Laterality Date    ANKLE SURGERY      chip, fracture, and then tendon repair; surgery x 2    CARDIAC CATHETERIZATION  2011    dr. yarbrough/normal/throught the wrist    CHOLECYSTECTOMY      COLON SURGERY      polyps that ruptured, colostomy takedown    GALLBLADDER SURGERY      HERNIA REPAIR N/A 2/21/2020    ROBOTIC ASSISTED LAPAROSCOPIC VENTRAL HERNIA REPAIR TIMES TWO performed by Chiquita Anthony DO at [address] [place] [address]    KNEE ARTHROSCOPY  07       Social History:  Social History     Tobacco Use    Smoking status: Never Smoker    Smokeless tobacco: Current User     Types: Snuff    Tobacco comment: dips one can every 3 days. Vaping Use    Vaping Use: Not on file   Substance Use Topics    Alcohol use: No    Drug use: No       Vital Signs:   Vitals:    01/27/22 0913   BP: 108/77   Pulse: 88   Resp: 18   Temp: 97.6 °F (36.4 °C)   SpO2: 97%        Physical Exam:  Cardiac:  [x]WNL  []Comments:  Pulmonary:  [x]WNL   []Comments:  Neuro/Mental Status:  [x]WNL  []Comments:  Abdominal:  [x]WNL    []Comments:  Other:   []WNL  []Comments:    Informed Consent:  The risks and benefits of the procedure have been discussed with either the patient or if they cannot consent, their representative. Assessment:  Patient examined and appropriate for planned sedation and procedure. Plan:  Proceed with planned sedation and procedure as above. Salud Chilel am scribing for and in the presence of Dr. Heidi Mcgraw MD.  Electronically signed by Moraima Chi RN on 1/27/2022 at 9:21 AM    I personally performed the services described in this documentation as scribed by Tami Marrufo, and it appears accurate and complete.      Heidi Mcgraw MD  1/27/2022

## 2022-01-27 NOTE — ANESTHESIA POSTPROCEDURE EVALUATION
Department of Anesthesiology  Postprocedure Note    Patient: Diego Newsome  MRN: [id number]  YOB: 1965  Date of evaluation: 1/27/2022  Time:  9:52 AM     Procedure Summary     Date: 01/27/22 Room / Location: [facility] ENDO 02 / [address] [place] [address]    Anesthesia Start: 3250 Anesthesia Stop: 8340    Procedure: COLONOSCOPY POLYPECTOMY SNARE/COLD BIOPSY (N/A Abdomen) Diagnosis: (SCREEN, HX POLYPS)    Surgeons: Isma Montesinos MD Responsible Provider: JON Stephens CRNA    Anesthesia Type: general, TIVA ASA Status: 2          Anesthesia Type: general, TIVA    Bruno Phase I:      Bruno Phase II:      Last vitals: Reviewed and per EMR flowsheets.        Anesthesia Post Evaluation    Patient location during evaluation: bedside  Patient participation: complete - patient participated  Level of consciousness: awake  Pain score: 0  Airway patency: patent  Nausea & Vomiting: no nausea and no vomiting  Complications: no  Cardiovascular status: hemodynamically stable  Respiratory status: acceptable, spontaneous ventilation and room air  Hydration status: euvolemic

## 2022-01-27 NOTE — ANESTHESIA PRE PROCEDURE
Department of Anesthesiology  Preprocedure Note       Name:  Sania Varela   Age:  49 y.o.  :  1965                                          MRN:  [id number]         Date:  2022      Surgeon: Anais Martinez):  Annie Hinojosa MD    Procedure: COLORECTAL CANCER SCREENING, NOT HIGH RISK (N/A Abdomen)    Medications prior to admission:   Prior to Admission medications    Medication Sig Start Date End Date Taking? Authorizing Provider   enoxaparin (LOVENOX) 40 MG/0.4ML injection Inject 40 mg into the skin    Historical Provider, MD   Cholecalciferol (VITAMIN D3) 1.25 MG (78439 UT) CAPS Take 1 each by mouth every 7 days    Historical Provider, MD   levothyroxine (SYNTHROID) 25 MCG tablet Take 25 mcg by mouth Daily Patient uncertain of dose    Historical Provider, MD   albuterol sulfate  (90 Base) MCG/ACT inhaler 2 puffs every 6 hours as needed for Wheezing or Shortness of Breath  3/15/21   Historical Provider, MD   warfarin (COUMADIN) 5 MG tablet Take 1 tablet by mouth daily 21   Jackie Warren PA-C       Current medications:    No current facility-administered medications for this visit. No current outpatient medications on file. Facility-Administered Medications Ordered in Other Visits   Medication Dose Route Frequency Provider Last Rate Last Admin    0.9 % sodium chloride infusion   IntraVENous Continuous Annie Hinojosa MD           Allergies:     Allergies   Allergen Reactions    Reglan [Metoclopramide Hcl] Shortness Of Breath     \"cuts my breathing off\"    Allopurinol Rash    Cephalexin Rash     Keflex causes a rash       Problem List:    Patient Active Problem List   Diagnosis Code    Headache R51.9    Hyperlipemia E78.5    Leg DVT (deep venous thromboembolism), acute, unspecified laterality (HCC) I82.409    Pulmonary embolism (HCC) I26.99    Stage 3 chronic kidney disease ([facility] [address] N18.30       Past Medical History:        Diagnosis Date    Bleeds easily ([facility] [address]     ever since colon surgery    Headache(784.0)     Hx of blood clots     Hyperlipidemia     mild; pcp never called in meds/no meds.  Kidney stone     hx of    S/P cardiac cath 07/14/2011    Thyroid disease     Ventral hernia        Past Surgical History:        Procedure Laterality Date    ANKLE SURGERY      chip, fracture, and then tendon repair; surgery x 2    CARDIAC CATHETERIZATION  2011    dr. yarbrough/normal/throught the wrist    CHOLECYSTECTOMY      COLON SURGERY      polyps that ruptured, colostomy takedown    GALLBLADDER SURGERY      HERNIA REPAIR N/A 2/21/2020    ROBOTIC ASSISTED LAPAROSCOPIC VENTRAL HERNIA REPAIR TIMES TWO performed by Shoshana Salinas DO at [address] [place] [address]    KNEE ARTHROSCOPY  07       Social History:    Social History     Tobacco Use    Smoking status: Never Smoker    Smokeless tobacco: Current User     Types: Snuff    Tobacco comment: dips one can every 3 days. Substance Use Topics    Alcohol use: No                                Ready to quit: Not Answered  Counseling given: Not Answered  Comment: dips one can every 3 days. Vital Signs (Current): There were no vitals filed for this visit.                                            BP Readings from Last 3 Encounters:   01/27/22 108/77   03/19/21 (!) 142/88   02/26/21 115/82       NPO Status:                                                                                 BMI:   Wt Readings from Last 3 Encounters:   01/27/22 235 lb (106.6 kg)   03/19/21 230 lb 6.4 oz (104.5 kg)   02/25/21 221 lb (100.2 kg)     There is no height or weight on file to calculate BMI.    CBC:   Lab Results   Component Value Date    WBC 10.43 03/19/2021    RBC 3.95 03/19/2021    HGB 13.9 03/19/2021    HCT 40.2 03/19/2021    HCT 43.5 07/11/2011    .8 03/19/2021    RDW 11.7 03/19/2021     03/19/2021     07/11/2011       CMP:   Lab Results   Component Value Date     02/24/2021     07/11/2011    K 3.7 02/24/2021    K 3.8 07/11/2011     02/24/2021     07/11/2011    CO2 26 02/24/2021    BUN 14 02/24/2021    CREATININE 1.4 02/24/2021    CREATININE 1.4 07/11/2011    GFRAA >59 02/24/2021    LABGLOM 53 02/24/2021    GLUCOSE 114 02/24/2021    PROT 7.9 02/23/2021    PROT 7.9 07/11/2011    CALCIUM 9.2 02/24/2021    BILITOT 0.7 02/23/2021    ALKPHOS 87 02/23/2021    ALKPHOS 71 07/11/2011    AST 17 02/23/2021    ALT 20 02/23/2021       POC Tests: No results for input(s): POCGLU, POCNA, POCK, POCCL, POCBUN, POCHEMO, POCHCT in the last 72 hours. Coags:   Lab Results   Component Value Date    PROTIME 13.4 02/26/2021    INR 1.03 02/26/2021    APTT 89.3 02/24/2021       HCG (If Applicable): No results found for: PREGTESTUR, PREGSERUM, HCG, HCGQUANT     ABGs: No results found for: PHART, PO2ART, DIU2TSK, SOW6XNX, BEART, T0CKEPKO     Type & Screen (If Applicable):  No results found for: LABABO, [address]    Anesthesia Evaluation  Patient summary reviewed and Nursing notes reviewed no history of anesthetic complications:   Airway: Mallampati: I  TM distance: >3 FB   Neck ROM: full  Mouth opening: > = 3 FB Dental: normal exam   (+) upper dentures and lower dentures      Pulmonary:Negative Pulmonary ROS and normal exam  breath sounds clear to auscultation      (-) shortness of breath and not a current smoker          Patient did not smoke on day of surgery.                  Cardiovascular:  Exercise tolerance: good (>4 METS),   (+) hyperlipidemia    (-) hypertension, CAD,  angina and  CHF    NYHA Classification: I  ECG reviewed  Rhythm: regular  Rate: normal           Beta Blocker:  Not on Beta Blocker         Neuro/Psych:   Negative Neuro/Psych ROS  (+) headaches: migraine headaches,    (-) seizures, CVA and depression/anxiety            GI/Hepatic/Renal: Neg GI/Hepatic/Renal ROS  (+) renal disease: kidney stones,      (-) hiatal hernia and GERD       Endo/Other: Negative Endo/Other ROS   (+) hypothyroidism::., .          Pt had PAT visit. Abdominal:       Abdomen: soft. Vascular:   + DVT, PE. Other Findings:               Anesthesia Plan      general and TIVA     ASA 2     (Iv zofran within 30 min of closing )  Induction: intravenous. MIPS: Postoperative opioids intended. Anesthetic plan and risks discussed with patient.                       JON Cosby - CRNA   1/27/2022

## 2022-01-27 NOTE — OP NOTE
Patient: Crys Man : 1965  [facility] Rec#: [id number] Acc#: [id number]   Primary Care Provider Lakisha Larsen DO    Date of Procedure:  2022    Endoscopist: Aguilar Miguel MD    Referring Provider: Lakisha Larsen DO    Operation Performed:     Colonoscopy with snare polypectomy  Colonoscopy with biopsy polypectomy    Indications: Screening    Anesthesia:  Sedation was administered by anesthesia who monitored the patient during the procedure. I met with Crys Man prior to procedure. We discussed the procedure itself, and I have discussed the risks of endoscopy (including-- but not limited to-- pain, discomfort, bleeding potentially requiring second endoscopic procedure and/or blood transfusion, organ perforation requiring operative repair, damage to organs near the colon, infection, aspiration, cardiopulmonary/allergic reaction), benefits, indications to endoscopy. Additionally, we discussed options other than colonoscopy. The patient expressed understanding. All questions answered. The patient decided to proceed with the procedure. Signed informed consent was placed on the chart. Blood Loss: minimal    Withdrawal time: n/a  Bowel Prep: adequate     Complications: no immediate complications    DESCRIPTION OF PROCEDURE:     A time out was performed. After written informed consent was obtained, the patient was placed in the left lateral position. The perianal area was inspected, and a digital rectal exam was performed. A rectal exam was performed: normal tone, no palpable lesions. At this point, a forward viewing Olympus colonoscope was inserted into the anus and carefully advanced to the cecum. The cecum was identified by the ileocecal valve and the appendiceal orifice. The colonoscope was then slowly withdrawn with careful inspection of the mucosa in a linear and circumferential fashion. The scope was retroflexed in the rectum.  Suction was utilized during the procedure to remove as much air as possible from the bowel. The colonoscope was removed from the patient, and the procedure was terminated. Findings are listed below. Findings: The mucosa appeared normal throughout the entire examined colon. At 60 cm, a 6 mm sessile polyp was removed completely with cold snare polypectomy. At 45 cm, a 4 mm sessile polyp was removed completely with cold snare polypectomy. At 30 cm, a 3 mm sessile polyp was removed completely with cold foceps polypectomy     At 25 cm, there is a healthy and patent anastomosis. Retroflexion in the rectum was normal and revealed no further abnormalities. Recommendations:  1. Repeat colonoscopy: pending pathology - 5 years  2. Await biopsy results-you will receive a letter with your results. 3. Okay to restart Coumadin today. Findings and recommendations were discussed w/ the patient. A copy of the images was provided. Viola Figueroa am scribing for and in the presence of Dr. Lexus Garibay MD.  Electronically signed by Matthew Navarro RN on 1/27/2022 at 9:22 AM    I personally performed the services described in this documentation as scribed by Lea Cogan, and it appears accurate and complete.      Lexus Garibay MD  1/27/2022

## 2022-02-16 ENCOUNTER — HOSPITAL ENCOUNTER (OUTPATIENT)
Dept: VASCULAR LAB | Age: 57
Discharge: HOME | End: 2022-02-16
Payer: MEDICAID

## 2022-02-16 DIAGNOSIS — I82.4Z1 ACUTE EMBOLISM AND THROMBOSIS OF DEEP VEIN OF RIGHT DISTAL LOWER EXTREMITY (HCC): Primary | ICD-10-CM

## 2022-02-16 PROCEDURE — 93971 EXTREMITY STUDY: CPT

## 2022-02-17 ENCOUNTER — HOSPITAL ENCOUNTER (OUTPATIENT)
Dept: NEUROLOGY | Age: 57
Discharge: HOME | End: 2022-02-17
Payer: MEDICAID

## 2022-02-17 DIAGNOSIS — G62.9 POLYNEUROPATHY: ICD-10-CM

## 2022-02-17 PROCEDURE — 95909 NRV CNDJ TST 5-6 STUDIES: CPT | Performed by: PSYCHIATRY & NEUROLOGY

## 2022-02-17 PROCEDURE — 95909 NRV CNDJ TST 5-6 STUDIES: CPT

## 2022-03-04 NOTE — OP NOTE
POST-OPERATIVE NOTE    Pre-Operative Diagnosis: 1. Lateral Ankle Instability, Left           2. Peroneus Brevis Tendon Tear, Left    Post-Operative Diagnosis: 1. Lateral Ankle Instability, Left             2. Peroneus Brevis Tendon Tear, Left             3. Peroneal Tendons Chronic Synovitis, Left             4. Low Lying Muscle Belly of Peroneus Brevis Tendon, Left    Operative Procedures: 1. Brostrom-Carbajal Lateral Ankle Stabilization with Internal Brace Augmentation, Left       2. Peroneal Synovectomy, Left       3. Excision of Low Lying Muscle Belly of Peroneus Brevis Tendon, Left       4. Repair of Peroneal Brevis Tendon Tear, Left       5. Application of Posterior Splint    Surgeon: Davis Comer DPM    Anesthesia: General with Popliteal Block    Hemostasis: Anatomic Dissection, Thigh Tourniquet, Electric cauterization    Estimated Blood Loss: Minimal    Pathology:   ID Type Source Tests Collected by Time   A : muscle tissue left foot Tissue Foot, Left TISSUE PATHOLOGY EXAM Ajay Comer DPM 9/19/2018 1254       Materials:   Implant Name Type Inv. Item Serial No.  Lot No. LRB No. Used   SYS IMP INTERNALBRACE REPR AUG LIG W/FIBRTAPE - [id number] Implant SYS IMP INTERNALBRACE REPR AUG LIG W/FIBRTAPE  [employer] [id number] Left 1   SUT/ANCH BIOCOMP SUTTAK 1 SUT W/NDL NO1 3X14 - [id number] Implant SUT/ANCH BIOCOMP SUTTAK 1 SUT W/NDL NO1 3X14   [employer] [id number] Left 1       Injectables: 10mL 0.5% Marcaine plain    Fluids: See anesthesia log.    Drains: None    Complications: None    Post-Op Condition: Stable. Patient tolerated procedure and anesthesia well. Patient left the operating room with vital signs stable and vascular status intact.     Operative Findings: Consistent with pre-operative diagnoses. Chronic inflammation and synovitis to peroneal tendon sheaths. Peroneus Brevis muscle belly was low lying and extended beyond distal fibula.     Indications for Procedure: This 53 year old patient presents  The cataracts are creating some visual symptoms that are tolerable at this time. with chronic pain to left lateral ankle.  He is found to have peroneus brevis tear on MRI and instability clinically.  Patient states that he has failed conservative therapy and opts for surgical correction at this time. The patient has been NPO for greater than 8 hours. The patient is ready for surgical intervention.    DESCRIPTION OF PROCEDURE  While in preoperative holding, the patient was given popliteal block by anesthesia.  Under mild sedation, patient was brought into the operating room and place on the operating room table in lateral position. A pneumatic thigh tourniquet was placed about the patient's left thigh. The patient was placed under General anesthesia, then local block was performed at the surgical site using the above mentioned local anesthesia. The foot and ankle were then scrubbed, prepped and draped in the usual aseptic manner.    Attention was then directed left lateral ankle, posterior to the fibula where a curvilinear incision was made along the peroneal tendons.  The incision was deepened to the subcutaneous tissues using sharp and blunt dissection.  Care was taken to identify and retract all vital neural and vascular structures.  All bleeders were ligated and cauterized as necessary.  Incision was deepened down to the level of the retinaculum which was linearly incised, exposing the peroneal sheath, which was also incised along the same course as the skin incision.  Upon incision of the peroneal sheath, there was an abundance of inflammatory fluid, which was removed.  There was inflamed synovium around the peroneus longus and peroneus brevis tendons, which was excised and passed from the operative field.  The parents longus tendon was inspected and noted to be healthy.  Upon inspection of the peroneus brevis tendon, there was noted to be a low lying muscle belly, which extended past the distal tip of the fibula and a longitudinal tear roughly measuring 6 cm, which is caused significant  flattening of the tendon.  The low-lying muscle belly was resected off of the tendon as far proximal within the incision to allow for the tendon to glide unobstructed and not cause attenuation of the muscle.  The resected muscle belly will be sent to pathology for analysis.  The peroneus brevis tear was resected from the tendon.  The remaining tendon was roughened to allow for good reapproximation and re-tubularization.  Utilizing 2-0 FiberWire, the previous brevis tendon was re-tubularized.  The tendons were placed back into the fibular groove and noted to glide and function appropriately.  The wound was flushed with copious amounts of sterile normal saline.  The peroneal sheath and retinaculum were reapproximated utilizing 4-0 Vicryl.  Subcutaneous tissues reapproximated using 4-0 Vicryl.  Skin was reapproximated utilizing 5-0 Vicryl and subcuticular suturing technique.    Attention was then directed to the left anterior lateral ankle along the ATFL, where a curvilinear incision was made.  Incision was deepened to the subcutaneous tissues using sharp and blunt dissection.  Care was taken to identify and retract all vital neural and vascular structures.  All bleeders were ligated and cauterized as necessary.  Incision was deepened down to the level of the extensor retinaculum, ATFL and capsule.  The ATFL was tested interoperatively utilizing an anterior drawer maneuver and noted to be lax.  At this time, a incision along the course of the skin incision was made through the retinaculum capsule and ligament.  The talar and fibular cartilage was inspected within the wound and noted to be intact and without defects.  The incision was flushed with copious months of sterile normal saline.  The ligament was then plicated utilizing a pants over vest technique with #2 FiberWire.  To further strengthen and augment the repair of the ATFL, a Arthrex internal brace was utilized.  The anchors were placed into the lateral aspect  of the talus from a lateral to medial approach and into the anterior aspect of the fibula from an anterior to posterior approach.  Upon completion of the repair, the ATFL was again tested via the anterior drawer maneuver and found to be significantly improved and stable.  Again, the wound was flushed with copious months of sterile saline.  Subcutaneous tissues were then reapproximated utilizing 4-0 Vicryl.  Skin was reapproximated utilizing 5-0 Vicryl in a subcuticular suturing technique.    The incisions were dressed with Mastisol, Steri-Strips, Adaptic, gauze, Kerlix, Coban, webril, and a fiberglass posterior splint applied.  The tourniquet was deflated after the internal bandage was applied.  Capillary refill was noted to all digits of the left foot.    The patient tolerated the procedure and anesthesia well.  He is transferred to the recovery room with vital signs stable and vascular status intact to all toes of the left foot.  After period of postoperative monitoring, the patient will be discharged home with oral and written postoperative instructions.  The patient is to be nonweightbearing.  He will follow-up in office within 1 week.

## 2022-03-18 NOTE — TELEPHONE ENCOUNTER
Notified patient of appointment time change to 9:30 am. Patient gave verbal understanding of appointment time change. Reminder letter will be mailed as well.   
Left upper arm venogram

## 2023-12-27 ENCOUNTER — TRANSCRIBE ORDERS (OUTPATIENT)
Dept: ADMINISTRATIVE | Facility: HOSPITAL | Age: 58
End: 2023-12-27
Payer: MEDICAID

## 2023-12-27 DIAGNOSIS — N50.9 MALE REPRODUCTIVE SYSTEM DISORDER: Primary | ICD-10-CM

## 2024-01-05 ENCOUNTER — HOSPITAL ENCOUNTER (OUTPATIENT)
Dept: ULTRASOUND IMAGING | Facility: HOSPITAL | Age: 59
Discharge: HOME | End: 2024-01-05
Admitting: INTERNAL MEDICINE
Payer: MEDICAID

## 2024-01-05 DIAGNOSIS — N50.9 MALE REPRODUCTIVE SYSTEM DISORDER: ICD-10-CM

## 2024-01-05 PROCEDURE — 76870 US EXAM SCROTUM: CPT

## 2024-02-09 ENCOUNTER — HOSPITAL ENCOUNTER (OUTPATIENT)
Dept: PREADMISSION TESTING | Age: 59
Discharge: HOME | End: 2024-02-13
Payer: MEDICAID

## 2024-02-09 VITALS — HEIGHT: 70 IN | WEIGHT: 239 LBS | BODY MASS INDEX: 34.22 KG/M2

## 2024-02-09 PROCEDURE — 93005 ELECTROCARDIOGRAM TRACING: CPT | Performed by: ORTHOPAEDIC SURGERY

## 2024-02-09 RX ORDER — SILDENAFIL 100 MG/1
50-100 TABLET, FILM COATED ORAL PRN
COMMUNITY

## 2024-02-09 RX ORDER — IRBESARTAN 150 MG/1
150 TABLET ORAL DAILY
COMMUNITY

## 2024-02-09 RX ORDER — CLINDAMYCIN PHOSPHATE 900 MG/50ML
900 INJECTION, SOLUTION INTRAVENOUS ONCE
OUTPATIENT
Start: 2024-02-23

## 2024-02-09 RX ORDER — LEVOTHYROXINE SODIUM 0.15 MG/1
150 TABLET ORAL DAILY
COMMUNITY

## 2024-02-09 NOTE — DISCHARGE INSTRUCTIONS
PREOPERATIVE GUIDELINES WHEN RECEIVING ANESTHESIA    Do not eat or drink anything after midnight, the night before your surgery. No gum or candy the morning of surgery.  This is extremely important for your safety.    Take a bath (or shower) the night before your surgery and you may brush your teeth the morning of your surgery.    You will be scheduled to arrive at the hospital 2 hours before your surgery, or follow your surgeon's instructions.    Dress comfortably.  Wear loose clothing that will be easy to remove and comfortable for your trip home.    You may wear eyeglasses or contacts but bring your cases with you as they must be remove before your surgery.    Hearing aids and dentures will need to be removed before your surgery.    Do not wear any jewelry, including body jewelry.  All jewelry will need to be removed prior to your surgery.    Do not wear fingernail polish or make-up.    It is best not to bring any valuables with you.    If you are to stay in the hospital overnight, bring your robe, slippers and personal toiletries that you may need.      POSTOPERATIVE GUIDELINES AFTER RECEIVING ANESTHESIA    If you are to go home after your surgery, you will need a responsible adult to drive you home.     You will not be able to take public transportation after your discharge from the Operative Care Unit unless you are accompanied by a        responsible adult.    On returning home, be sure to follow your physician's orders regarding diet, activity and medications.    Remember, surgery with general anesthesia or sedation may leave you sleepy, very tired and with a decreased appetite for 12 to 24 hours.    If you develop any post-surgical complications or problems, call your surgeon or [facility] Emergency Department ([phone]).        The day before surgery you will receive a phone call from the surgery nurse to let you know what time to arrive on the day of surgery. This call will usually be between 2-4 PM. If

## 2024-02-11 LAB
EKG P AXIS: 36 DEGREES
EKG P-R INTERVAL: 178 MS
EKG Q-T INTERVAL: 426 MS
EKG QRS DURATION: 104 MS
EKG QTC CALCULATION (BAZETT): 419 MS
EKG T AXIS: 27 DEGREES

## 2024-02-11 PROCEDURE — 93010 ELECTROCARDIOGRAM REPORT: CPT | Performed by: INTERNAL MEDICINE

## 2024-02-21 NOTE — DISCHARGE INSTRUCTIONS
Upper Extremity Post-op Instructions  Dr. VENEGAS      POST-OP CARE: Please follow these instructions closely!    Sling use: The sling is provided for your comfort and to ensure proper healing of your repair following surgery. Please  place the abduction pillow under your arm. Your surgery requires that you wear the sling:  __X__ At all times except bathing, dressing, and therapy. Also wear the sling during sleep.    IMPORTANT PHONE NUMBERS:  For emergencies, please call [phone]  You may reach Dr. Venegas or his MA Tracy Cassidytara at [phone] EXT: [phone]  M-F 8:00am-5:00pm  After 5pm or on the weekends, please call [phone] to reach the doctor on call.  Call immediately if you have any of the following symptoms:  Elevated temperature above 101 degrees for more than 48hours after surgery  Persistent drainage from wound  Severe pain around surgical site  Calf pain  Any signs of infection    Dressings: Keep dressing/splint intact unless instructed otherwise below. SOME DRAINAGE IS NORMAL!     DO NOT touch or apply ointment to the incision.     DO NOT remove the steri-strips over the incisions (if you have steri-strips). They will         generally fall off on their own or can be removed 2 weeks after surgery.     If you have yellow gauze and it comes off, do not worry about it. Leave them off.    Signs of infection that warrant a phone call to our clinical line:     o Excessive drainage or redness     o Red streaking coming away from the incision  o Increased pain  o Increased temperature above 101 degrees    Sutures:  If your physician uses sutures in your knee, they will dissolve on their own and will not need to be removed.  Some black sutures occasionally used will need to be removed 10-14 days after surgery.    Bathing:    _X_ Keep your adhesive dressings in place until follow up.  You may remove your cotton dressings leaving your Steri-Strips in place and begin showering on the 3rd day following surgery.

## 2024-02-22 ENCOUNTER — ANESTHESIA EVENT (OUTPATIENT)
Dept: OPERATING ROOM | Age: 59
End: 2024-02-22
Payer: COMMERCIAL

## 2024-02-23 ENCOUNTER — ANESTHESIA (OUTPATIENT)
Dept: OPERATING ROOM | Age: 59
End: 2024-02-23
Payer: COMMERCIAL

## 2024-02-23 ENCOUNTER — HOSPITAL ENCOUNTER (OUTPATIENT)
Age: 59
Setting detail: OUTPATIENT SURGERY
Discharge: HOME | End: 2024-02-23
Attending: ORTHOPAEDIC SURGERY | Admitting: ORTHOPAEDIC SURGERY
Payer: COMMERCIAL

## 2024-02-23 VITALS
DIASTOLIC BLOOD PRESSURE: 80 MMHG | TEMPERATURE: 97 F | HEART RATE: 78 BPM | SYSTOLIC BLOOD PRESSURE: 125 MMHG | HEIGHT: 69 IN | BODY MASS INDEX: 35.55 KG/M2 | OXYGEN SATURATION: 94 % | RESPIRATION RATE: 18 BRPM | WEIGHT: 240 LBS

## 2024-02-23 DIAGNOSIS — S46.012A TRAUMATIC INCOMPLETE TEAR OF LEFT ROTATOR CUFF, INITIAL ENCOUNTER: Primary | ICD-10-CM

## 2024-02-23 LAB
GLUCOSE BLD-MCNC: 123 MG/DL (ref 70–99)
GLUCOSE BLD-MCNC: 148 MG/DL (ref 70–99)
PERFORMED ON: ABNORMAL
PERFORMED ON: ABNORMAL

## 2024-02-23 PROCEDURE — 82962 GLUCOSE BLOOD TEST: CPT

## 2024-02-23 PROCEDURE — A4217 STERILE WATER/SALINE, 500 ML: HCPCS | Performed by: ORTHOPAEDIC SURGERY

## 2024-02-23 PROCEDURE — 2709999900 HC NON-CHARGEABLE SUPPLY: Performed by: ORTHOPAEDIC SURGERY

## 2024-02-23 PROCEDURE — 2580000003 HC RX 258: Performed by: ANESTHESIOLOGY

## 2024-02-23 PROCEDURE — 7100000001 HC PACU RECOVERY - ADDTL 15 MIN: Performed by: ORTHOPAEDIC SURGERY

## 2024-02-23 PROCEDURE — 3600000014 HC SURGERY LEVEL 4 ADDTL 15MIN: Performed by: ORTHOPAEDIC SURGERY

## 2024-02-23 PROCEDURE — 3700000001 HC ADD 15 MINUTES (ANESTHESIA): Performed by: ORTHOPAEDIC SURGERY

## 2024-02-23 PROCEDURE — 2580000003 HC RX 258: Performed by: ORTHOPAEDIC SURGERY

## 2024-02-23 PROCEDURE — 64415 NJX AA&/STRD BRCH PLXS IMG: CPT | Performed by: NURSE ANESTHETIST, CERTIFIED REGISTERED

## 2024-02-23 PROCEDURE — 6360000002 HC RX W HCPCS: Performed by: ANESTHESIOLOGY

## 2024-02-23 PROCEDURE — C1763 CONN TISS, NON-HUMAN: HCPCS | Performed by: ORTHOPAEDIC SURGERY

## 2024-02-23 PROCEDURE — 7100000011 HC PHASE II RECOVERY - ADDTL 15 MIN: Performed by: ORTHOPAEDIC SURGERY

## 2024-02-23 PROCEDURE — 6360000002 HC RX W HCPCS: Performed by: ORTHOPAEDIC SURGERY

## 2024-02-23 PROCEDURE — 3700000000 HC ANESTHESIA ATTENDED CARE: Performed by: ORTHOPAEDIC SURGERY

## 2024-02-23 PROCEDURE — 7100000010 HC PHASE II RECOVERY - FIRST 15 MIN: Performed by: ORTHOPAEDIC SURGERY

## 2024-02-23 PROCEDURE — 2720000010 HC SURG SUPPLY STERILE: Performed by: ORTHOPAEDIC SURGERY

## 2024-02-23 PROCEDURE — 7100000000 HC PACU RECOVERY - FIRST 15 MIN: Performed by: ORTHOPAEDIC SURGERY

## 2024-02-23 PROCEDURE — C1776 JOINT DEVICE (IMPLANTABLE): HCPCS | Performed by: ORTHOPAEDIC SURGERY

## 2024-02-23 PROCEDURE — 2500000003 HC RX 250 WO HCPCS: Performed by: NURSE ANESTHETIST, CERTIFIED REGISTERED

## 2024-02-23 PROCEDURE — 6360000002 HC RX W HCPCS: Performed by: NURSE ANESTHETIST, CERTIFIED REGISTERED

## 2024-02-23 PROCEDURE — 2500000003 HC RX 250 WO HCPCS: Performed by: ANESTHESIOLOGY

## 2024-02-23 PROCEDURE — C1713 ANCHOR/SCREW BN/BN,TIS/BN: HCPCS | Performed by: ORTHOPAEDIC SURGERY

## 2024-02-23 PROCEDURE — 3600000004 HC SURGERY LEVEL 4 BASE: Performed by: ORTHOPAEDIC SURGERY

## 2024-02-23 DEVICE — BUTTON FIX FOR IMPL SYS FIBERTAK: Type: IMPLANTABLE DEVICE | Site: SHOULDER | Status: FUNCTIONAL

## 2024-02-23 DEVICE — ANCHOR TEND 8 FOR REGENETEN BIOINDUCTIVE IMPL SYS: Type: IMPLANTABLE DEVICE | Site: SHOULDER | Status: FUNCTIONAL

## 2024-02-23 DEVICE — LOOP N TACK 3.9 TENODESIS SYS: Type: IMPLANTABLE DEVICE | Site: SHOULDER | Status: FUNCTIONAL

## 2024-02-23 DEVICE — SP 2.6 MM KNOTLESS FIBERTAK ANCHOR
Type: IMPLANTABLE DEVICE | Site: SHOULDER | Status: FUNCTIONAL
Brand: ARTHREX®

## 2024-02-23 DEVICE — IMPLANTABLE DEVICE
Type: IMPLANTABLE DEVICE | Site: SHOULDER | Status: FUNCTIONAL
Brand: BIOINDUCTIVE IMPLANT WITH ARTHROSCOPIC DELIVERY SYSTEM - MEDIUM

## 2024-02-23 DEVICE — BONE ANCHORS 3 WITH ARTHROSCOPIC DELIVERY SYSTEM ADVANCED
Type: IMPLANTABLE DEVICE | Site: SHOULDER | Status: FUNCTIONAL
Brand: BONE ANCHORS WITH ARTHROSCOPIC DELIVERY SYSTEM - ADVANCED

## 2024-02-23 DEVICE — BC SWIVELOCK, 3.9X17.9MM
Type: IMPLANTABLE DEVICE | Site: SHOULDER | Status: FUNCTIONAL
Brand: ARTHREX®

## 2024-02-23 RX ORDER — SODIUM CHLORIDE 0.9 % (FLUSH) 0.9 %
5-40 SYRINGE (ML) INJECTION EVERY 12 HOURS SCHEDULED
Status: DISCONTINUED | OUTPATIENT
Start: 2024-02-23 | End: 2024-02-23 | Stop reason: HOSPADM

## 2024-02-23 RX ORDER — HYDROMORPHONE HYDROCHLORIDE 1 MG/ML
0.5 INJECTION, SOLUTION INTRAMUSCULAR; INTRAVENOUS; SUBCUTANEOUS EVERY 5 MIN PRN
Status: DISCONTINUED | OUTPATIENT
Start: 2024-02-23 | End: 2024-02-23 | Stop reason: HOSPADM

## 2024-02-23 RX ORDER — ROPIVACAINE HYDROCHLORIDE 5 MG/ML
INJECTION, SOLUTION EPIDURAL; INFILTRATION; PERINEURAL
Status: COMPLETED | OUTPATIENT
Start: 2024-02-23 | End: 2024-02-23

## 2024-02-23 RX ORDER — ROPIVACAINE HYDROCHLORIDE 5 MG/ML
30 INJECTION, SOLUTION EPIDURAL; INFILTRATION; PERINEURAL ONCE
Status: DISCONTINUED | OUTPATIENT
Start: 2024-02-23 | End: 2024-02-23 | Stop reason: HOSPADM

## 2024-02-23 RX ORDER — SODIUM CHLORIDE 0.9 % (FLUSH) 0.9 %
5-40 SYRINGE (ML) INJECTION PRN
Status: DISCONTINUED | OUTPATIENT
Start: 2024-02-23 | End: 2024-02-23 | Stop reason: HOSPADM

## 2024-02-23 RX ORDER — PROPOFOL 10 MG/ML
INJECTION, EMULSION INTRAVENOUS PRN
Status: DISCONTINUED | OUTPATIENT
Start: 2024-02-23 | End: 2024-02-23 | Stop reason: SDUPTHER

## 2024-02-23 RX ORDER — HYDROMORPHONE HYDROCHLORIDE 1 MG/ML
0.25 INJECTION, SOLUTION INTRAMUSCULAR; INTRAVENOUS; SUBCUTANEOUS EVERY 5 MIN PRN
Status: DISCONTINUED | OUTPATIENT
Start: 2024-02-23 | End: 2024-02-23 | Stop reason: HOSPADM

## 2024-02-23 RX ORDER — SODIUM CHLORIDE, SODIUM LACTATE, POTASSIUM CHLORIDE, CALCIUM CHLORIDE 600; 310; 30; 20 MG/100ML; MG/100ML; MG/100ML; MG/100ML
INJECTION, SOLUTION INTRAVENOUS CONTINUOUS
Status: DISCONTINUED | OUTPATIENT
Start: 2024-02-23 | End: 2024-02-23 | Stop reason: HOSPADM

## 2024-02-23 RX ORDER — FENTANYL CITRATE 50 UG/ML
INJECTION, SOLUTION INTRAMUSCULAR; INTRAVENOUS PRN
Status: DISCONTINUED | OUTPATIENT
Start: 2024-02-23 | End: 2024-02-23 | Stop reason: SDUPTHER

## 2024-02-23 RX ORDER — DOCUSATE SODIUM 100 MG/1
100 CAPSULE, LIQUID FILLED ORAL 2 TIMES DAILY
Qty: 60 CAPSULE | Refills: 1 | Status: SHIPPED | OUTPATIENT
Start: 2024-02-23

## 2024-02-23 RX ORDER — CLINDAMYCIN PHOSPHATE 900 MG/50ML
900 INJECTION, SOLUTION INTRAVENOUS ONCE
Status: COMPLETED | OUTPATIENT
Start: 2024-02-23 | End: 2024-02-23

## 2024-02-23 RX ORDER — ROCURONIUM BROMIDE 10 MG/ML
INJECTION, SOLUTION INTRAVENOUS PRN
Status: DISCONTINUED | OUTPATIENT
Start: 2024-02-23 | End: 2024-02-23 | Stop reason: SDUPTHER

## 2024-02-23 RX ORDER — ONDANSETRON 4 MG/1
4 TABLET, FILM COATED ORAL EVERY 8 HOURS PRN
Qty: 20 TABLET | Refills: 1 | Status: SHIPPED | OUTPATIENT
Start: 2024-02-23

## 2024-02-23 RX ORDER — EPHEDRINE SULFATE 50 MG/ML
INJECTION, SOLUTION INTRAVENOUS PRN
Status: DISCONTINUED | OUTPATIENT
Start: 2024-02-23 | End: 2024-02-23 | Stop reason: SDUPTHER

## 2024-02-23 RX ORDER — TRANEXAMIC ACID 100 MG/ML
INJECTION, SOLUTION INTRAVENOUS PRN
Status: DISCONTINUED | OUTPATIENT
Start: 2024-02-23 | End: 2024-02-23 | Stop reason: SDUPTHER

## 2024-02-23 RX ORDER — MIDAZOLAM HYDROCHLORIDE 2 MG/2ML
2 INJECTION, SOLUTION INTRAMUSCULAR; INTRAVENOUS
Status: COMPLETED | OUTPATIENT
Start: 2024-02-23 | End: 2024-02-23

## 2024-02-23 RX ORDER — SODIUM CHLORIDE 9 MG/ML
INJECTION, SOLUTION INTRAVENOUS PRN
Status: DISCONTINUED | OUTPATIENT
Start: 2024-02-23 | End: 2024-02-23 | Stop reason: HOSPADM

## 2024-02-23 RX ORDER — HYDROCODONE BITARTRATE AND ACETAMINOPHEN 7.5; 325 MG/1; MG/1
1 TABLET ORAL
Qty: 60 TABLET | Refills: 0 | Status: SHIPPED | OUTPATIENT
Start: 2024-02-23 | End: 2024-03-24

## 2024-02-23 RX ORDER — ONDANSETRON 2 MG/ML
4 INJECTION INTRAMUSCULAR; INTRAVENOUS
Status: DISCONTINUED | OUTPATIENT
Start: 2024-02-23 | End: 2024-02-23 | Stop reason: HOSPADM

## 2024-02-23 RX ORDER — LIDOCAINE HYDROCHLORIDE 20 MG/ML
INJECTION, SOLUTION EPIDURAL; INFILTRATION; INTRACAUDAL; PERINEURAL PRN
Status: DISCONTINUED | OUTPATIENT
Start: 2024-02-23 | End: 2024-02-23 | Stop reason: SDUPTHER

## 2024-02-23 RX ORDER — CYCLOBENZAPRINE HCL 10 MG
10 TABLET ORAL 3 TIMES DAILY PRN
Qty: 30 TABLET | Refills: 0 | Status: SHIPPED | OUTPATIENT
Start: 2024-02-23 | End: 2024-03-04

## 2024-02-23 RX ORDER — DEXAMETHASONE SODIUM PHOSPHATE 10 MG/ML
INJECTION, SOLUTION INTRAMUSCULAR; INTRAVENOUS PRN
Status: DISCONTINUED | OUTPATIENT
Start: 2024-02-23 | End: 2024-02-23 | Stop reason: SDUPTHER

## 2024-02-23 RX ORDER — ONDANSETRON 2 MG/ML
INJECTION INTRAMUSCULAR; INTRAVENOUS PRN
Status: DISCONTINUED | OUTPATIENT
Start: 2024-02-23 | End: 2024-02-23 | Stop reason: SDUPTHER

## 2024-02-23 RX ADMIN — PROPOFOL 200 MG: 10 INJECTION, EMULSION INTRAVENOUS at 09:41

## 2024-02-23 RX ADMIN — ONDANSETRON 4 MG: 2 INJECTION INTRAMUSCULAR; INTRAVENOUS at 11:09

## 2024-02-23 RX ADMIN — DEXAMETHASONE SODIUM PHOSPHATE 10 MG: 10 INJECTION, SOLUTION INTRAMUSCULAR; INTRAVENOUS at 10:14

## 2024-02-23 RX ADMIN — PHENYLEPHRINE HYDROCHLORIDE 100 MCG: 10 INJECTION INTRAVENOUS at 10:21

## 2024-02-23 RX ADMIN — PHENYLEPHRINE HYDROCHLORIDE 100 MCG: 10 INJECTION INTRAVENOUS at 10:03

## 2024-02-23 RX ADMIN — PHENYLEPHRINE HYDROCHLORIDE 100 MCG: 10 INJECTION INTRAVENOUS at 10:17

## 2024-02-23 RX ADMIN — PHENYLEPHRINE HYDROCHLORIDE 100 MCG: 10 INJECTION INTRAVENOUS at 11:11

## 2024-02-23 RX ADMIN — SODIUM CHLORIDE, PRESERVATIVE FREE 20 MG: 5 INJECTION INTRAVENOUS at 08:33

## 2024-02-23 RX ADMIN — TRANEXAMIC ACID 1000 MG: 1 INJECTION, SOLUTION INTRAVENOUS at 10:28

## 2024-02-23 RX ADMIN — SODIUM CHLORIDE, SODIUM LACTATE, POTASSIUM CHLORIDE, AND CALCIUM CHLORIDE: 600; 310; 30; 20 INJECTION, SOLUTION INTRAVENOUS at 08:34

## 2024-02-23 RX ADMIN — EPHEDRINE SULFATE 5 MG: 50 INJECTION INTRAMUSCULAR; INTRAVENOUS; SUBCUTANEOUS at 10:39

## 2024-02-23 RX ADMIN — ROCURONIUM BROMIDE 50 MG: 10 INJECTION, SOLUTION INTRAVENOUS at 09:41

## 2024-02-23 RX ADMIN — ROPIVACAINE HYDROCHLORIDE 20 ML: 5 INJECTION, SOLUTION EPIDURAL; INFILTRATION; PERINEURAL at 09:30

## 2024-02-23 RX ADMIN — PHENYLEPHRINE HYDROCHLORIDE 100 MCG: 10 INJECTION INTRAVENOUS at 10:10

## 2024-02-23 RX ADMIN — EPHEDRINE SULFATE 10 MG: 50 INJECTION INTRAMUSCULAR; INTRAVENOUS; SUBCUTANEOUS at 10:21

## 2024-02-23 RX ADMIN — PHENYLEPHRINE HYDROCHLORIDE 100 MCG: 10 INJECTION INTRAVENOUS at 09:56

## 2024-02-23 RX ADMIN — SUGAMMADEX 300 MG: 100 INJECTION, SOLUTION INTRAVENOUS at 11:22

## 2024-02-23 RX ADMIN — LIDOCAINE HYDROCHLORIDE 100 MG: 20 INJECTION, SOLUTION EPIDURAL; INFILTRATION; INTRACAUDAL; PERINEURAL at 09:41

## 2024-02-23 RX ADMIN — EPHEDRINE SULFATE 5 MG: 50 INJECTION INTRAMUSCULAR; INTRAVENOUS; SUBCUTANEOUS at 10:43

## 2024-02-23 RX ADMIN — PHENYLEPHRINE HYDROCHLORIDE 100 MCG: 10 INJECTION INTRAVENOUS at 10:52

## 2024-02-23 RX ADMIN — CLINDAMYCIN PHOSPHATE 900 MG: 900 INJECTION, SOLUTION INTRAVENOUS at 09:56

## 2024-02-23 RX ADMIN — SODIUM CHLORIDE, SODIUM LACTATE, POTASSIUM CHLORIDE, AND CALCIUM CHLORIDE: 600; 310; 30; 20 INJECTION, SOLUTION INTRAVENOUS at 10:52

## 2024-02-23 RX ADMIN — FENTANYL CITRATE 50 MCG: 0.05 INJECTION, SOLUTION INTRAMUSCULAR; INTRAVENOUS at 10:33

## 2024-02-23 RX ADMIN — MIDAZOLAM 2 MG: 1 INJECTION INTRAMUSCULAR; INTRAVENOUS at 09:29

## 2024-02-23 RX ADMIN — FENTANYL CITRATE 50 MCG: 0.05 INJECTION, SOLUTION INTRAMUSCULAR; INTRAVENOUS at 09:41

## 2024-02-23 ASSESSMENT — PAIN DESCRIPTION - DESCRIPTORS
DESCRIPTORS: BURNING
DESCRIPTORS: BURNING

## 2024-02-23 ASSESSMENT — PAIN - FUNCTIONAL ASSESSMENT
PAIN_FUNCTIONAL_ASSESSMENT: 0-10
PAIN_FUNCTIONAL_ASSESSMENT: 0-10
PAIN_FUNCTIONAL_ASSESSMENT: NONE - DENIES PAIN

## 2024-02-23 ASSESSMENT — LIFESTYLE VARIABLES: SMOKING_STATUS: 0

## 2024-02-23 NOTE — ANESTHESIA POSTPROCEDURE EVALUATION
Department of Anesthesiology  Postprocedure Note    Patient: Jose G Blunt  MRN: [id number]  YOB: 1965  Date of evaluation: 2/23/2024    Procedure Summary       Date: 02/23/24 Room / Location: [address]    Anesthesia Start: 0935 Anesthesia Stop: 1150    Procedures:       LEFT SHOULDER ARTHROSCOPY ROTATOR CUFF REPAIR WITH DISTAL CLAVICLE EXCISION, POSSIBLE ACROMIOPLASTY (Left)      MINI OPEN SUBPECTORAL BICEPS TENODESIS Diagnosis:       Traumatic incomplete tear of left rotator cuff, initial encounter      (Traumatic incomplete tear of left rotator cuff, initial encounter [S46.012A])    Surgeons: Omero Mary MD Responsible Provider: Tobias Lancaster APRN - CRNA    Anesthesia Type: general, regional ASA Status: 3            Anesthesia Type: No value filed.    Bruno Phase I: Bruno Score: 5    Bruno Phase II:      Anesthesia Post Evaluation    Patient location during evaluation: PACU  Patient participation: complete - patient participated  Level of consciousness: sleepy but conscious  Pain score: 0  Airway patency: patent  Nausea & Vomiting: no vomiting and no nausea  Cardiovascular status: blood pressure returned to baseline  Respiratory status: acceptable and face mask  Hydration status: stable  Multimodal analgesia pain management approach  Pain management: adequate    No notable events documented.

## 2024-02-23 NOTE — OP NOTE
Patient Name: Jessica  : 1965  MRN: [id number]    DATE of SURGERY: 2024    SURGEON: Omero Mary MD    ASSISTANT: NONE    PREOPERATIVE DIAGNOSIS:  1) Acute traumatic incomplete rotator cuff tear of the left shoulder  2) Superior glenoid labral lesion (SLAP tear), proximal biceps tendinitis, left shoulder    3) Subacromial impingement syndrome, left shoulder   4) Acromioclavicular joint primary osteoarthritis, left shoulder     POSTOPERATIVE DIAGNOSIS:  1) Acute traumatic incomplete rotator cuff tear of the left shoulder  2) Superior glenoid labral lesion (SLAP tear), proximal biceps tendinitis, left shoulder    3) Subacromial impingement syndrome, left shoulder   4) Acromioclavicular joint primary osteoarthritis, left shoulder     PROCEDURE PERFORMED:  1) Left shoulder arthroscopic rotator cuff repair with bio inductive patch augmentation  2) Left shoulder arthroscopic biceps tenotomy, labral debridement, mini-open subpectoral biceps tenodesis  3) Left shoulder arthroscopic subacromial decompression  4) Left shoulder arthroscopic distal clavicle excision    IMPLANTS: Arthrex 3.9 mm BioSwiveLock anchor x 2, Arthrex 2.6 mm FiberTak anchor x 3, [employer] Regeneten bio inductive patch    ANESTHESIA USED: General endotracheal anesthesia, interscalene block    ESTIMATED BLOOD LOSS: minimal    DRAINS: none     COMPLICATIONS: none    SPECIMENS: none      OPERATIVE INDICATIONS: This patient is a 58 y.o. male presented to my clinic with complaints of progressive shoulder pain after a traumatic injury to the shoulder. An MRI was obtained which showed the above-named diagnoses. Pain was not relieved with conservative treatment consisting of anti-inflammatories, corticosteroid injections or physical therapy.  Due to progressive pain and loss of function, surgical evaluation was discussed, and the patient wished to proceed understanding risks, benefits, and alternatives. The surgical indication was to relieve  midaxillary incision was made at the margin of the pectoralis.  Strategic retractors were placed medially and laterally to the humerus.  The previously tenotomized biceps tendon was identified and retrieved.  It was whipstitched starting approximately 1 cm from the musculotendinous junction. A 2.5 mm spade-tipped drill was advanced through the near cortex in line with the bicipital groove.  An Arthrex 2.6 mm FiberTak anchor was advanced into the drill hole and tension was applied to set the suture mechanism.  Using the retained shuttle sutures, each of the whipstitch suture tails were shuttled through the anchor mechanism.  Sequential tensioning was applied to approximate the tendon up to the cortex.  One of the suture tails was passed through the substance of the tendon using a free needle and the tails were then tied over the top to complete the tenodesis.  This incision was copiously irrigated and closed in layers.  3-0 Vicryl was utilized at the deep dermal layer of the skin.  An adhesive Prenio skin dressing was applied thereafter followed by a sterile dressing.    Portals were then closed with Monocryl, and a sterile dressing was applied followed by a sling.  The patient was awakened from anesthesia, transported to the recovery room in stable condition.    POSTOP PLAN:    1) Discharge home with family  2) Follow up in 2 weeks for a clinical check  3) Follow the following protocol: Small/Medium RCR  Biceps tenodesis  DCE  SAD - we will initiate formal physical therapy at the 2-week postoperative

## 2024-02-23 NOTE — ANESTHESIA PROCEDURE NOTES
Peripheral Block    Patient location during procedure: holding area  Reason for block: post-op pain management  Start time: 2/23/2024 9:30 AM  End time: 2/23/2024 9:31 AM  Staffing  Performed: anesthesiologist   Anesthesiologist: Pj Clarke MD  Performed by: Pj Clarke MD  Authorized by: Pj Clarke MD    Preanesthetic Checklist  Completed: patient identified, IV checked, site marked, risks and benefits discussed, surgical/procedural consents, equipment checked, pre-op evaluation, timeout performed, anesthesia consent given, oxygen available, monitors applied/VS acknowledged, fire risk safety assessment completed and verbalized and blood product R/B/A discussed and consented  Peripheral Block   Patient position: supine  Prep: ChloraPrep  Provider prep: mask and sterile gloves  Patient monitoring: continuous pulse ox and frequent blood pressure checks  Block type: Brachial plexus  Interscalene  Laterality: left  Injection technique: single-shot  Guidance: ultrasound guided  Local infiltration: lidocaine  Local infiltration: lidocaine    Needle   Needle type: Quincke   Needle gauge: 20 G  Needle localization: ultrasound guidance  Needle length: 10 cm  Assessment   Injection assessment: negative aspiration for heme, no paresthesia on injection, local visualized surrounding nerve on ultrasound and no intravascular symptoms  Paresthesia pain: none  Slow fractionated injection: yes  Hemodynamics: stable  Real-time US image taken/store: yes  Outcomes: uncomplicated and patient tolerated procedure well    Medications Administered  ropivacaine (NAROPIN) injection 0.5% - Perineural   20 mL - 2/23/2024 9:30:00 AM

## 2024-02-23 NOTE — PROGRESS NOTES
CLINICAL PHARMACY NOTE: MEDS TO BEDS    Total # of Prescriptions Filled: 4   The following medications were delivered to the patient:  Discharge Medication List as of 2/23/2024 11:51 AM        START taking these medications    Details   HYDROcodone-acetaminophen (NORCO) 7.5-325 MG per tablet Take 1 tablet by mouth every 4-6 hours as needed for Pain for up to 30 days. Max Daily Amount: 6 tablets, Disp-60 tablet, R-0Normal      cyclobenzaprine (FLEXERIL) 10 MG tablet Take 1 tablet by mouth 3 times daily as needed for Muscle spasms, Disp-30 tablet, R-0Normal      docusate sodium (COLACE) 100 MG capsule Take 1 capsule by mouth 2 times daily, Disp-60 capsule, R-1Normal      ondansetron (ZOFRAN) 4 MG tablet Take 1 tablet by mouth every 8 hours as needed for Nausea or Vomiting, Disp-20 tablet, R-1Normal               Additional Documentation:    Patient picked up vashti at pharmacy. Paid with card.

## 2024-02-23 NOTE — ANESTHESIA PRE PROCEDURE
Department of Anesthesiology  Preprocedure Note       Name:  Jose G Blunt   Age:  58 y.o.  :  1965                                          MRN:  [id number]         Date:  2024      Surgeon: Surgeon(s):  Omero Mary MD    Procedure: Procedure(s):  LEFT SHOULDER ARTHROSCOPY ROTATOR CUFF REPAIR WITH DISTAL CLAVICLE EXCISION, POSSIBLE ACROMIOPLASTY  MINI OPEN SUBPECTORAL BICEPS TENODESIS    Medications prior to admission:   Prior to Admission medications    Medication Sig Start Date End Date Taking? Authorizing Provider   metFORMIN (GLUCOPHAGE) 500 MG tablet Take 1 tablet by mouth 2 times daily (with meals) Indications: Diabetes    ProviderJose MD   Rosuvastatin Calcium 20 MG CPSP Take 20 mg by mouth at bedtime Indications: High Amount of Fats in the Blood    ProviderJose MD   irbesartan (AVAPRO) 150 MG tablet Take 1 tablet by mouth daily Indications: High Blood Pressure Disorder    Jose Garcia MD   levothyroxine (SYNTHROID) 150 MCG tablet Take 1 tablet by mouth Daily Indications: Underactive Thyroid    ProviderJose MD   sildenafil (VIAGRA) 100 MG tablet Take 0.5-1 tablets by mouth as needed for Erectile Dysfunction    Jose Garcia MD   enoxaparin (LOVENOX) 40 MG/0.4ML injection Inject 40 mg into the skin  Patient not taking: Reported on 2024    Jose Garcia MD   warfarin (COUMADIN) 5 MG tablet Take 1 tablet by mouth daily 21   Evelyn Villalta PA-C       Current medications:    Current Facility-Administered Medications   Medication Dose Route Frequency Provider Last Rate Last Admin   • clindamycin (CLEOCIN) 900 mg in dextrose 5 % 50 mL IVPB  900 mg IntraVENous Once Omero Mary MD       • lactated ringers IV soln infusion   IntraVENous Continuous Nisha Marshall MD           Allergies:    Allergies   Allergen Reactions   • Reglan [Metoclopramide Hcl] Shortness Of Breath     \"cuts my breathing off\"   • Allopurinol Rash   • Cephalexin Rash

## 2024-02-23 NOTE — H&P
Pt Name: Jose G Blunt  MRN: [id number]  YOB: 1965  Date: 2/23/2024      HPI: 58 y.o. year old male with a known rotator cuff tear of the left shoulder. Chronic pain and weakness have been non responsive to conservative treatments, not limited to, NSAIDs, corticosteroid injections, and physical therapy.      Past Medical/Surgical History:   Past Medical History:   Diagnosis Date    Bleeds easily (HCC)     ever since colon surgery    Diabetes mellitus (HCC)     Headache(784.0)     Hx of blood clots     right leg    Hyperlipidemia     Hypertension     Kidney stone     hx of    S/P cardiac cath 07/14/2011    Thyroid disease     Ventral hernia      Past Surgical History:   Procedure Laterality Date    ANKLE SURGERY      chip, fracture, and then tendon repair; surgery x 2    CARDIAC CATHETERIZATION  2011    Dr Lopez/normal/throught the wrist    CHOLECYSTECTOMY      COLON SURGERY      polyps that ruptured, colostomy takedown    COLONOSCOPY N/A 01/27/2022    Dr GLADYS Diaz-Healthy and patent anastomosis-AP x 3, 3 yr recall    HERNIA REPAIR N/A 02/21/2020    ROBOTIC ASSISTED LAPAROSCOPIC VENTRAL HERNIA REPAIR TIMES TWO performed by Petty Blackwell DO at [facility] OR    KIDNEY STONE SURGERY  1996    KNEE ARTHROSCOPY  2007         Social History:    Smoking:  No Smoking History = 0   Alcohol: Rare social consumption    Medications:   Prior to Admission medications    Medication Sig Start Date End Date Taking? Authorizing Provider   metFORMIN (GLUCOPHAGE) 500 MG tablet Take 1 tablet by mouth 2 times daily (with meals) Indications: Diabetes    ProviderJose MD   Rosuvastatin Calcium 20 MG CPSP Take 20 mg by mouth at bedtime Indications: High Amount of Fats in the Blood    ProviderJose MD   irbesartan (AVAPRO) 150 MG tablet Take 1 tablet by mouth daily Indications: High Blood Pressure Disorder    ProviderJose MD   levothyroxine (SYNTHROID) 150 MCG tablet Take 1 tablet by mouth Daily Indications:

## 2024-04-01 ENCOUNTER — OFFICE VISIT (OUTPATIENT)
Dept: UROLOGY | Age: 59
End: 2024-04-01
Payer: MEDICAID

## 2024-04-01 VITALS — WEIGHT: 240 LBS | TEMPERATURE: 98.2 F | BODY MASS INDEX: 33.6 KG/M2 | HEIGHT: 71 IN

## 2024-04-01 DIAGNOSIS — N43.40 SPERMATOCELE OF EPIDIDYMIS: Primary | ICD-10-CM

## 2024-04-01 PROCEDURE — 99243 OFF/OP CNSLTJ NEW/EST LOW 30: CPT | Performed by: UROLOGY

## 2024-04-01 ASSESSMENT — ENCOUNTER SYMPTOMS
ABDOMINAL PAIN: 0
CONSTIPATION: 0
SHORTNESS OF BREATH: 0
BACK PAIN: 0
EYE DISCHARGE: 0
ABDOMINAL DISTENTION: 0
TROUBLE SWALLOWING: 0
EYE REDNESS: 0
COUGH: 0
VOMITING: 0
NAUSEA: 0
DIARRHEA: 0

## 2024-04-01 NOTE — PROGRESS NOTES
Jose G Blunt is a 58 y.o. male who presents today   Chief Complaint   Patient presents with    New Patient     I am here today for a cyst of epididymis. I had an U/S done at [facility].        Spermatocele:  Patient is here today for a spermatocele which was first noted approximately 3-4 month(s) ago.  Patient states he noted 1 testicle was larger than the other he told his PCP who ordered a scrotal ultrasound done at [facility] on 1/5/2024.  This shows bilateral epididymal cysts patient is now referred for evaluation.  He reports no significant pain and no change in size  The spermatocele is: bilateral and multiple.   Recently the size is stable.  Previous injury or surgery? no.  Any pain or discomfort associated with spermatocele? No  Pain severity: none    Past Medical History:   Diagnosis Date    Bleeds easily (HCC)     ever since colon surgery    Diabetes mellitus (HCC)     Headache(784.0)     Hx of blood clots     right leg    Hyperlipidemia     Hypertension     Kidney stone     hx of    S/P cardiac cath 07/14/2011    Thyroid disease     Ventral hernia        Past Surgical History:   Procedure Laterality Date    ANKLE SURGERY      chip, fracture, and then tendon repair; surgery x 2    BICEPS TENDON REPAIR N/A 2/23/2024    MINI OPEN SUBPECTORAL BICEPS TENODESIS performed by Omero Mary MD at [facility] OR    CARDIAC CATHETERIZATION  2011    Dr Lopez/normal/throught the wrist    CHOLECYSTECTOMY      COLON SURGERY      polyps that ruptured, colostomy takedown    COLONOSCOPY N/A 01/27/2022    Dr GLADYS Diaz-Healthy and patent anastomosis-AP x 3, 3 yr recall    HERNIA REPAIR N/A 02/21/2020    ROBOTIC ASSISTED LAPAROSCOPIC VENTRAL HERNIA REPAIR TIMES TWO performed by Petty Blackwell DO at [facility] OR    KIDNEY STONE SURGERY  1996    KNEE ARTHROSCOPY  2007    SHOULDER ARTHROSCOPY Left 2/23/2024    LEFT SHOULDER ARTHROSCOPY ROTATOR CUFF REPAIR WITH DISTAL CLAVICLE EXCISION, POSSIBLE ACROMIOPLASTY performed by Omero Mary MD at
